# Patient Record
Sex: MALE | Race: WHITE | NOT HISPANIC OR LATINO | Employment: UNEMPLOYED | URBAN - NONMETROPOLITAN AREA
[De-identification: names, ages, dates, MRNs, and addresses within clinical notes are randomized per-mention and may not be internally consistent; named-entity substitution may affect disease eponyms.]

---

## 2017-01-16 RX ORDER — ATORVASTATIN CALCIUM 20 MG/1
TABLET, FILM COATED ORAL
Qty: 30 TABLET | Refills: 5 | Status: SHIPPED | OUTPATIENT
Start: 2017-01-16 | End: 2017-01-17 | Stop reason: SDUPTHER

## 2017-01-17 RX ORDER — ASPIRIN 81 MG
TABLET, DELAYED RELEASE (ENTERIC COATED) ORAL
Qty: 30 TABLET | Refills: 5 | Status: SHIPPED | OUTPATIENT
Start: 2017-01-17 | End: 2017-07-17 | Stop reason: SDUPTHER

## 2017-01-17 RX ORDER — MELOXICAM 15 MG/1
TABLET ORAL
Qty: 30 TABLET | Refills: 5 | Status: SHIPPED | OUTPATIENT
Start: 2017-01-17 | End: 2020-01-14 | Stop reason: SDUPTHER

## 2017-01-17 RX ORDER — ATORVASTATIN CALCIUM 20 MG/1
TABLET, FILM COATED ORAL
Qty: 30 TABLET | Refills: 5 | Status: SHIPPED | OUTPATIENT
Start: 2017-01-17 | End: 2018-09-28

## 2017-01-17 RX ORDER — AVOBENZONE, HOMOSALATE, OCTISALATE, OCTOCRYLENE 30; 100; 50; 25 MG/ML; MG/ML; MG/ML; MG/ML
SPRAY TOPICAL
Qty: 30 TABLET | Refills: 5 | Status: SHIPPED | OUTPATIENT
Start: 2017-01-17 | End: 2017-06-15 | Stop reason: SDUPTHER

## 2017-01-17 RX ORDER — ATORVASTATIN CALCIUM 20 MG/1
TABLET, FILM COATED ORAL
Qty: 30 TABLET | Refills: 5 | Status: SHIPPED | OUTPATIENT
Start: 2017-01-17 | End: 2018-01-08 | Stop reason: SDUPTHER

## 2017-02-01 DIAGNOSIS — I10 ESSENTIAL HYPERTENSION: ICD-10-CM

## 2017-02-01 RX ORDER — LISINOPRIL 10 MG/1
TABLET ORAL
Qty: 30 TABLET | Refills: 5 | Status: SHIPPED | OUTPATIENT
Start: 2017-02-01 | End: 2017-08-28 | Stop reason: SDUPTHER

## 2017-02-06 DIAGNOSIS — E11.40 TYPE 2 DIABETES MELLITUS WITH DIABETIC NEUROPATHY (HCC): ICD-10-CM

## 2017-02-10 RX ORDER — MIRABEGRON 25 MG/1
TABLET, FILM COATED, EXTENDED RELEASE ORAL
Qty: 30 TABLET | Refills: 5 | Status: SHIPPED | OUTPATIENT
Start: 2017-02-10 | End: 2017-12-04 | Stop reason: SDUPTHER

## 2017-02-27 DIAGNOSIS — E11.40 TYPE 2 DIABETES MELLITUS WITH DIABETIC NEUROPATHY (HCC): ICD-10-CM

## 2017-02-27 RX ORDER — GABAPENTIN 300 MG/1
CAPSULE ORAL
Qty: 30 CAPSULE | Refills: 5 | Status: SHIPPED | OUTPATIENT
Start: 2017-02-27 | End: 2017-09-07 | Stop reason: SDUPTHER

## 2017-03-06 RX ORDER — LORATADINE 10 MG/1
10 TABLET ORAL DAILY PRN
Qty: 30 TABLET | Refills: 5 | Status: SHIPPED | OUTPATIENT
Start: 2017-03-06 | End: 2017-06-15

## 2017-03-06 RX ORDER — LORATADINE 10 MG/1
TABLET ORAL
Qty: 30 TABLET | Refills: 3 | OUTPATIENT
Start: 2017-03-06

## 2017-03-13 ENCOUNTER — OFFICE VISIT (OUTPATIENT)
Dept: FAMILY MEDICINE CLINIC | Facility: CLINIC | Age: 62
End: 2017-03-13

## 2017-03-13 VITALS
TEMPERATURE: 99 F | BODY MASS INDEX: 28.92 KG/M2 | RESPIRATION RATE: 12 BRPM | HEART RATE: 76 BPM | OXYGEN SATURATION: 97 % | DIASTOLIC BLOOD PRESSURE: 70 MMHG | SYSTOLIC BLOOD PRESSURE: 130 MMHG | WEIGHT: 202 LBS | HEIGHT: 70 IN

## 2017-03-13 DIAGNOSIS — Z72.0 DIPS TOBACCO: ICD-10-CM

## 2017-03-13 DIAGNOSIS — J30.9 CHRONIC ALLERGIC RHINITIS: ICD-10-CM

## 2017-03-13 DIAGNOSIS — E11.40 TYPE 2 DIABETES MELLITUS WITH DIABETIC NEUROPATHY, WITHOUT LONG-TERM CURRENT USE OF INSULIN (HCC): ICD-10-CM

## 2017-03-13 DIAGNOSIS — I10 ESSENTIAL HYPERTENSION: Primary | ICD-10-CM

## 2017-03-13 DIAGNOSIS — K21.9 GASTRO-ESOPHAGEAL REFLUX DISEASE WITHOUT ESOPHAGITIS: ICD-10-CM

## 2017-03-13 DIAGNOSIS — R35.0 URINARY FREQUENCY: ICD-10-CM

## 2017-03-13 DIAGNOSIS — E78.2 MIXED HYPERLIPIDEMIA: ICD-10-CM

## 2017-03-13 DIAGNOSIS — L21.9 SEBORRHEIC DERMATITIS: ICD-10-CM

## 2017-03-13 LAB — HBA1C MFR BLD: 7.1 % (ref 4.5–5.7)

## 2017-03-13 PROCEDURE — 83036 HEMOGLOBIN GLYCOSYLATED A1C: CPT | Performed by: GENERAL PRACTICE

## 2017-03-13 PROCEDURE — 36415 COLL VENOUS BLD VENIPUNCTURE: CPT | Performed by: GENERAL PRACTICE

## 2017-03-13 PROCEDURE — 99214 OFFICE O/P EST MOD 30 MIN: CPT | Performed by: GENERAL PRACTICE

## 2017-03-13 RX ORDER — METFORMIN HYDROCHLORIDE 500 MG/1
TABLET, EXTENDED RELEASE ORAL
COMMUNITY
Start: 2017-02-27 | End: 2017-06-27 | Stop reason: SDUPTHER

## 2017-03-13 NOTE — PROGRESS NOTES
Subjective   Ed Villalba is a 61 y.o. male.     History of Present Illness     Diabetes  Current symptoms include polyuria. Patient denies paresthesia of the feet, visual disturbances, polydipsia, hypoglycemia and foot ulcerations. Evaluation to date has been: hemoglobin A1C. Home sugars: patient does not check sugars. Current treatments: metformin. Last dilated eye exam within one year. Most recent hemoglobin A1c   Lab Results   Component Value Date    HGBA1C 7.10 (H) 03/13/2017    HGBA1C 7.80 (H) 12/12/2016    HGBA1C 6.8 (H) 05/02/2016    HGBA1C 6.6 (H) 11/03/2015    HGBA1C 6.6 (H) 05/07/2015    He has been trying to follow his diet and excise plan more carefully since his last lab work    Dyslipidemia  Compliance with treatment has been excellent. The patient exercises daily. He is currently being prescribed the following medication for his dyslipidemia - atorvastatin. Patient denies side effects associated with his medications. Most recent lipids include  Lab Results   Component Value Date    TRIG 296 (H) 12/12/2016    TRIG 120 05/02/2016    TRIG 91 11/03/2015    HDL 32 (L) 12/12/2016    HDL 32 (L) 05/02/2016    HDL 35 (L) 11/03/2015    LDLCALC 23 12/12/2016    LDL 32 05/02/2016    LDL 32 11/03/2015     Hypertension  Home blood pressure readings: are consistently at goal. Associated signs and symptoms: none. Patient denies: chest pain, palpitations, dyspnea, orthopnea, paroxysmal nocturnal dyspnea and peripheral edema. Current antihypertensive medications includes lisinopril. Medication compliance: taking as prescribed. Most recent creatinine   Lab Results   Component Value Date    CREATININE 0.97 12/12/2016    CREATININE 0.89 05/02/2016     Urinary Frequency  Since last here he has continued to hold myrbetriq for several days on and off as he develops suprapubic pain when he has taken it regularly for 3-4 weeks.  The pain has promptly resolved each time.  In between he continues to average 2-3 episodes of  nocturia and denies any daytime frequency, and has had no urgency, dysuria, or hematuria.  He denies any other apparent side effects    The following portions of the patient's history were reviewed and updated as appropriate: allergies, current medications, past medical history, past social history and problem list.    Review of Systems   Constitutional: Positive for fatigue. Negative for appetite change, chills, fever and unexpected weight change.   HENT: Negative for congestion, ear pain, rhinorrhea, sneezing, sore throat and voice change.    Eyes: Negative for visual disturbance.   Respiratory: Negative for cough, shortness of breath and wheezing.    Cardiovascular: Negative for chest pain, palpitations and leg swelling.   Gastrointestinal: Negative for abdominal pain, blood in stool, constipation, diarrhea, nausea and vomiting.   Endocrine: Positive for polyuria. Negative for polydipsia.   Genitourinary: Negative for difficulty urinating, dysuria, frequency, hematuria and urgency.        Nocturia ×2-3   Musculoskeletal: Positive for arthralgias. Negative for back pain, joint swelling, myalgias and neck pain.   Skin: Negative for color change.   Neurological: Negative for tremors, weakness, numbness and headaches.   Psychiatric/Behavioral: Negative for dysphoric mood, sleep disturbance and suicidal ideas. The patient is not nervous/anxious.      Objective   Physical Exam   Constitutional: He is oriented to person, place, and time. He appears well-developed and well-nourished. He is cooperative. He does not have a sickly appearance. No distress.   Bright, in good spirits.  No apparent distress.  No pallor, cyanosis, diaphoresis, or jaundice   HENT:   Head: Atraumatic.   Right Ear: Tympanic membrane, external ear and ear canal normal.   Left Ear: Tympanic membrane, external ear and ear canal normal.   Mouth/Throat: Oropharynx is clear and moist. Dental caries present.   Eyes: EOM are normal. Pupils are equal,  round, and reactive to light. No scleral icterus.   Neck: No JVD present. Carotid bruit is not present. No tracheal deviation present. No thyromegaly present.   Cardiovascular: Normal rate, regular rhythm, S1 normal, S2 normal, normal heart sounds and intact distal pulses.  Exam reveals no gallop.    No murmur heard.  Pulmonary/Chest: Breath sounds normal. He has no wheezes. He has no rales.   Abdominal: Soft. Normal aorta and bowel sounds are normal. He exhibits no abdominal bruit and no mass. There is no hepatosplenomegaly. There is no tenderness. No hernia.   Musculoskeletal: He exhibits no tenderness or deformity.   Lymphadenopathy:        Head (right side): No submandibular adenopathy present.        Head (left side): No submandibular adenopathy present.     He has no cervical adenopathy.   Neurological: He is alert and oriented to person, place, and time. He has normal strength and normal reflexes. He displays normal reflexes. A sensory deficit (decreased vibration sense both feet) is present. No cranial nerve deficit. He exhibits normal muscle tone. Coordination normal.   Skin: Skin is warm and dry. No rash noted. He is not diaphoretic. No pallor. Nails show no clubbing.   Psychiatric: He has a normal mood and affect. His behavior is normal.     Assessment/Plan   Problems Addressed this Visit        Cardiovascular and Mediastinum    Essential hypertension  Hypertension: at goal. Evidence of target organ damage: none.  Encouraged to continue to work on diet and exercise plan.   Continue current medication    Mixed hyperlipidemia  As above. Continue current medication.       Respiratory    Chronic allergic rhinitis       Digestive    Gastro-esophageal reflux disease without esophagitis       Endocrine    Type 2 diabetes mellitus with diabetic neuropathy, without long-term current use of insulin  Diabetes mellitus Type II, under inadequate control.   Encouraged to continue to pursue ADA diet  Encouraged aerobic  exercise.  Hemoglobin A1c drawn.  If this remains elevated we'll titrate metformin or add a second agent    Relevant Orders    Hemoglobin A1c (Completed)       Musculoskeletal and Integument    Seborrheic dermatitis       Genitourinary    Urinary frequency  Etiology remains unclear.  He remains uninterested in a urology reassessment.  Continue current medication.  Encouraged report if any worse or if any new symptoms prior to his return        Other    Dips tobacco

## 2017-06-15 ENCOUNTER — OFFICE VISIT (OUTPATIENT)
Dept: FAMILY MEDICINE CLINIC | Facility: CLINIC | Age: 62
End: 2017-06-15

## 2017-06-15 VITALS
RESPIRATION RATE: 12 BRPM | HEIGHT: 70 IN | SYSTOLIC BLOOD PRESSURE: 120 MMHG | WEIGHT: 197 LBS | DIASTOLIC BLOOD PRESSURE: 65 MMHG | OXYGEN SATURATION: 96 % | HEART RATE: 86 BPM | TEMPERATURE: 98.3 F | BODY MASS INDEX: 28.2 KG/M2

## 2017-06-15 DIAGNOSIS — E11.40 TYPE 2 DIABETES MELLITUS WITH DIABETIC NEUROPATHY, WITHOUT LONG-TERM CURRENT USE OF INSULIN (HCC): ICD-10-CM

## 2017-06-15 DIAGNOSIS — K21.9 GASTRO-ESOPHAGEAL REFLUX DISEASE WITHOUT ESOPHAGITIS: ICD-10-CM

## 2017-06-15 DIAGNOSIS — E78.2 MIXED HYPERLIPIDEMIA: ICD-10-CM

## 2017-06-15 DIAGNOSIS — R35.0 URINARY FREQUENCY: ICD-10-CM

## 2017-06-15 DIAGNOSIS — J30.9 CHRONIC ALLERGIC RHINITIS: ICD-10-CM

## 2017-06-15 DIAGNOSIS — I10 ESSENTIAL HYPERTENSION: Primary | ICD-10-CM

## 2017-06-15 DIAGNOSIS — Z00.00 HEALTHCARE MAINTENANCE: ICD-10-CM

## 2017-06-15 DIAGNOSIS — Z72.0 DIPS TOBACCO: ICD-10-CM

## 2017-06-15 PROCEDURE — 99214 OFFICE O/P EST MOD 30 MIN: CPT | Performed by: GENERAL PRACTICE

## 2017-06-15 RX ORDER — CETIRIZINE HYDROCHLORIDE 10 MG/1
10 TABLET ORAL DAILY PRN
Qty: 30 TABLET | Refills: 5 | Status: SHIPPED | OUTPATIENT
Start: 2017-06-15 | End: 2017-09-13

## 2017-06-15 NOTE — PROGRESS NOTES
Subjective   Ed Villalba is a 62 y.o. male.     History of Present Illness     Allergic Rhinitis  Patient has a history of allergic rhinitis. Patient's symptoms include sneezing, clear rhinorrhea, nasal congestion and postnasal drip. These symptoms are perennial with seasonal exacerbation. The patient has been suffering from these symptoms for a number of years . The patient has tried loratadine with fair relief of symptoms. Immunotherapy has never been tried. The patient has no history of asthma. The patient has no history of eczema. The patient does not suffer from frequent sinopulmonary infections. The patient has not had sinus surgery in the past.     Diabetes  Current symptoms include polyuria. Patient denies paresthesia of the feet, visual disturbances, polydipsia, hypoglycemia and foot ulcerations. Evaluation to date has been: hemoglobin A1C. Home sugars: patient does not check sugars. Current treatments: metformin.  He has been unable to tolerate any more than 500 mg daily.  Any higher dose results in dizziness.  Last dilated eye exam within one year. Most recent hemoglobin A1c   Lab Results   Component Value Date    HGBA1C 7.10 (H) 03/13/2017    HGBA1C 7.80 (H) 12/12/2016    HGBA1C 6.8 (H) 05/02/2016    HGBA1C 6.6 (H) 11/03/2015    HGBA1C 6.6 (H) 05/07/2015      Dyslipidemia  Compliance with treatment has been excellent. The patient exercises daily. He is currently being prescribed the following medication for his dyslipidemia - atorvastatin. Patient denies side effects associated with his medications. He has not had a recent lipid profile    Hypertension  Home blood pressure readings: are consistently at goal. Associated signs and symptoms: none. Patient denies: chest pain, palpitations, dyspnea, orthopnea, paroxysmal nocturnal dyspnea and peripheral edema. Current antihypertensive medications includes lisinopril. Medication compliance: taking as prescribed. Most recent creatinine   Lab Results    Component Value Date    CREATININE 0.97 12/12/2016    CREATININE 0.89 05/02/2016     Urinary Frequency  Since last here he has continued to hold myrbetriq for several days on and off as he develops suprapubic pain when he has taken it regularly for 3-4 weeks.  The pain has promptly resolved each time.  In between he continues to average 2-3 episodes of nocturia and denies any daytime frequency, and has had no urgency, dysuria, or hematuria.  He denies any other apparent side effects    The following portions of the patient's history were reviewed and updated as appropriate: allergies, current medications, past medical history, past social history and problem list.    Review of Systems   Constitutional: Positive for fatigue. Negative for appetite change, chills, fever and unexpected weight change.   HENT: Positive for congestion, postnasal drip, rhinorrhea and sneezing. Negative for ear pain, sinus pressure, sore throat and voice change.    Eyes: Negative for visual disturbance.   Respiratory: Negative for cough, shortness of breath and wheezing.    Cardiovascular: Negative for chest pain, palpitations and leg swelling.   Gastrointestinal: Negative for abdominal pain, blood in stool, constipation, diarrhea, nausea and vomiting.   Endocrine: Positive for polyuria. Negative for polydipsia.   Genitourinary: Negative for difficulty urinating, dysuria, frequency, hematuria and urgency.        Nocturia ×2-3   Musculoskeletal: Positive for arthralgias. Negative for back pain, joint swelling, myalgias and neck pain.   Skin: Negative for color change.   Neurological: Negative for tremors, weakness, numbness and headaches.   Psychiatric/Behavioral: Negative for dysphoric mood, sleep disturbance and suicidal ideas. The patient is not nervous/anxious.      Objective   Physical Exam   Constitutional: He is oriented to person, place, and time. He appears well-developed and well-nourished. He is cooperative. He does not have a  sickly appearance. No distress.   Bright, in good spirits.  No apparent distress.  No pallor, cyanosis, diaphoresis, or jaundice   HENT:   Head: Atraumatic.   Right Ear: Tympanic membrane, external ear and ear canal normal.   Left Ear: Tympanic membrane, external ear and ear canal normal.   Mouth/Throat: Oropharynx is clear and moist. Dental caries present.   Eyes: EOM are normal. Pupils are equal, round, and reactive to light. No scleral icterus.   Neck: No JVD present. Carotid bruit is not present. No tracheal deviation present. No thyromegaly present.   Cardiovascular: Normal rate, regular rhythm, S1 normal, S2 normal, normal heart sounds and intact distal pulses.  Exam reveals no gallop.    No murmur heard.  Pulmonary/Chest: Breath sounds normal. He has no wheezes. He has no rales.   Abdominal: Soft. Normal aorta and bowel sounds are normal. He exhibits no abdominal bruit and no mass. There is no hepatosplenomegaly. There is no tenderness. No hernia.   Musculoskeletal: He exhibits no tenderness or deformity.    Ed had a diabetic foot exam performed today.  Lymphadenopathy:        Head (right side): No submandibular adenopathy present.        Head (left side): No submandibular adenopathy present.     He has no cervical adenopathy.   Neurological: He is alert and oriented to person, place, and time. He has normal strength and normal reflexes. He displays normal reflexes. A sensory deficit (decreased vibration sense both feet) is present. No cranial nerve deficit. He exhibits normal muscle tone. Coordination normal.   Skin: Skin is warm and dry. No rash noted. He is not diaphoretic. No pallor. Nails show no clubbing.   Psychiatric: He has a normal mood and affect. His behavior is normal.     Assessment/Plan   Problems Addressed this Visit        Cardiovascular and Mediastinum    Essential hypertension   Hypertension: at goal. Evidence of target organ damage: none.  Encouraged to continue to work on diet and  exercise plan.   Continue current medication   Fasting lab work will be updated just prior to his return in 3 months    Relevant Orders    CBC & Differential    Comprehensive Metabolic Panel    Mixed hyperlipidemia  As above. Continue current medication.    Relevant Orders    Lipid Panel    TSH       Respiratory    Chronic allergic rhinitis  Reminded regarding allergen avoidance. Will replace loratadine with a trial of cetirizine    Relevant Medications    cetirizine (zyrTEC) 10 MG tablet       Digestive    Gastro-esophageal reflux disease without esophagitis       Endocrine    Type 2 diabetes mellitus with diabetic neuropathy, without long-term current use of insulin  Diabetes mellitus Type II, under good control.   Encouraged to continue to pursue ADA diet  Encouraged aerobic exercise.  Reminded to get yearly retinal exam.    Relevant Orders    Hemoglobin A1c    MicroAlbumin, Urine, Random       Genitourinary    Urinary frequency  Etiology remains unclear.  He remains uninterested in a urology reassessment.  Continue current medication.  Encouraged report if any worse or if any new symptoms prior to his return       Other    Dips tobacco    Healthcare maintenance  Reminded to get a flu shot when available.  We'll discuss an updated colonoscopy at his return      Relevant Medications    Cholecalciferol (RA VITAMIN D-3) 1000 UNITS tablet

## 2017-06-26 RX ORDER — METFORMIN HYDROCHLORIDE 500 MG/1
TABLET, EXTENDED RELEASE ORAL
Qty: 30 TABLET | Refills: 5 | Status: CANCELLED | OUTPATIENT
Start: 2017-06-26

## 2017-07-12 RX ORDER — METFORMIN HYDROCHLORIDE 500 MG/1
TABLET, EXTENDED RELEASE ORAL
Qty: 30 TABLET | Refills: 2
Start: 2017-07-12 | End: 2018-03-02

## 2017-07-12 RX ORDER — METFORMIN HYDROCHLORIDE 500 MG/1
500 TABLET, EXTENDED RELEASE ORAL
Qty: 30 TABLET | Refills: 5 | Status: SHIPPED | OUTPATIENT
Start: 2017-07-12 | End: 2018-03-02

## 2017-07-17 RX ORDER — ACETAMINOPHEN/DIPHENHYDRAMINE 500MG-25MG
TABLET ORAL
Qty: 30 TABLET | Refills: 5 | Status: SHIPPED | OUTPATIENT
Start: 2017-07-17 | End: 2018-01-08 | Stop reason: SDUPTHER

## 2017-07-17 RX ORDER — ACETAMINOPHEN/DIPHENHYDRAMINE 500MG-25MG
TABLET ORAL
Qty: 30 TABLET | Refills: 5 | OUTPATIENT
Start: 2017-07-17

## 2017-08-28 DIAGNOSIS — K21.9 GASTRO-ESOPHAGEAL REFLUX DISEASE WITHOUT ESOPHAGITIS: ICD-10-CM

## 2017-08-28 DIAGNOSIS — I10 ESSENTIAL HYPERTENSION: ICD-10-CM

## 2017-08-28 RX ORDER — PANTOPRAZOLE SODIUM 40 MG/1
TABLET, DELAYED RELEASE ORAL
Qty: 30 TABLET | Refills: 5 | Status: SHIPPED | OUTPATIENT
Start: 2017-08-28 | End: 2018-02-26 | Stop reason: SDUPTHER

## 2017-08-28 RX ORDER — LISINOPRIL 10 MG/1
TABLET ORAL
Qty: 30 TABLET | Refills: 5 | Status: SHIPPED | OUTPATIENT
Start: 2017-08-28 | End: 2018-02-26 | Stop reason: SDUPTHER

## 2017-09-07 ENCOUNTER — LAB (OUTPATIENT)
Dept: FAMILY MEDICINE CLINIC | Facility: CLINIC | Age: 62
End: 2017-09-07

## 2017-09-07 DIAGNOSIS — E11.40 TYPE 2 DIABETES MELLITUS WITH DIABETIC NEUROPATHY (HCC): ICD-10-CM

## 2017-09-07 DIAGNOSIS — E11.40 TYPE 2 DIABETES MELLITUS WITH DIABETIC NEUROPATHY, WITHOUT LONG-TERM CURRENT USE OF INSULIN (HCC): ICD-10-CM

## 2017-09-07 DIAGNOSIS — E78.2 MIXED HYPERLIPIDEMIA: ICD-10-CM

## 2017-09-07 DIAGNOSIS — I10 ESSENTIAL HYPERTENSION: ICD-10-CM

## 2017-09-07 LAB
ALBUMIN SERPL-MCNC: 4.3 G/DL (ref 3.4–4.8)
ALBUMIN UR-MCNC: 34.8 MG/L
ALBUMIN/GLOB SERPL: 1.4 G/DL (ref 1.5–2.5)
ALP SERPL-CCNC: 66 U/L (ref 40–129)
ALT SERPL W P-5'-P-CCNC: 33 U/L (ref 10–44)
ANION GAP SERPL CALCULATED.3IONS-SCNC: 6.8 MMOL/L (ref 3.6–11.2)
AST SERPL-CCNC: 24 U/L (ref 10–34)
BASOPHILS # BLD AUTO: 0.05 10*3/MM3 (ref 0–0.3)
BASOPHILS NFR BLD AUTO: 0.6 % (ref 0–2)
BILIRUB SERPL-MCNC: 0.7 MG/DL (ref 0.2–1.8)
BUN BLD-MCNC: 10 MG/DL (ref 7–21)
BUN/CREAT SERPL: 11.1 (ref 7–25)
CALCIUM SPEC-SCNC: 9.3 MG/DL (ref 7.7–10)
CHLORIDE SERPL-SCNC: 106 MMOL/L (ref 99–112)
CHOLEST SERPL-MCNC: 73 MG/DL (ref 0–200)
CO2 SERPL-SCNC: 27.2 MMOL/L (ref 24.3–31.9)
CREAT BLD-MCNC: 0.9 MG/DL (ref 0.43–1.29)
DEPRECATED RDW RBC AUTO: 46.4 FL (ref 37–54)
EOSINOPHIL # BLD AUTO: 1.79 10*3/MM3 (ref 0–0.7)
EOSINOPHIL NFR BLD AUTO: 21.7 % (ref 0–5)
ERYTHROCYTE [DISTWIDTH] IN BLOOD BY AUTOMATED COUNT: 14.2 % (ref 11.5–14.5)
GFR SERPL CREATININE-BSD FRML MDRD: 86 ML/MIN/1.73
GLOBULIN UR ELPH-MCNC: 3.1 GM/DL
GLUCOSE BLD-MCNC: 152 MG/DL (ref 70–110)
HBA1C MFR BLD: 7.6 % (ref 4.5–5.7)
HCT VFR BLD AUTO: 46.7 % (ref 42–52)
HDLC SERPL-MCNC: 28 MG/DL (ref 60–100)
HGB BLD-MCNC: 15.7 G/DL (ref 14–18)
IMM GRANULOCYTES # BLD: 0.01 10*3/MM3 (ref 0–0.03)
IMM GRANULOCYTES NFR BLD: 0.1 % (ref 0–0.5)
LDLC SERPL CALC-MCNC: 30 MG/DL (ref 0–100)
LDLC/HDLC SERPL: 1.09 {RATIO}
LYMPHOCYTES # BLD AUTO: 2.18 10*3/MM3 (ref 1–3)
LYMPHOCYTES NFR BLD AUTO: 26.4 % (ref 21–51)
MCH RBC QN AUTO: 30.7 PG (ref 27–33)
MCHC RBC AUTO-ENTMCNC: 33.6 G/DL (ref 33–37)
MCV RBC AUTO: 91.2 FL (ref 80–94)
MONOCYTES # BLD AUTO: 0.66 10*3/MM3 (ref 0.1–0.9)
MONOCYTES NFR BLD AUTO: 8 % (ref 0–10)
NEUTROPHILS # BLD AUTO: 3.57 10*3/MM3 (ref 1.4–6.5)
NEUTROPHILS NFR BLD AUTO: 43.2 % (ref 30–70)
OSMOLALITY SERPL CALC.SUM OF ELEC: 281.4 MOSM/KG (ref 273–305)
PLATELET # BLD AUTO: 201 10*3/MM3 (ref 130–400)
PMV BLD AUTO: 10.7 FL (ref 6–10)
POTASSIUM BLD-SCNC: 4.2 MMOL/L (ref 3.5–5.3)
PROT SERPL-MCNC: 7.4 G/DL (ref 6–8)
RBC # BLD AUTO: 5.12 10*6/MM3 (ref 4.7–6.1)
SODIUM BLD-SCNC: 140 MMOL/L (ref 135–153)
TRIGL SERPL-MCNC: 73 MG/DL (ref 0–150)
TSH SERPL DL<=0.05 MIU/L-ACNC: 1.71 MIU/ML (ref 0.55–4.78)
VLDLC SERPL-MCNC: 14.6 MG/DL
WBC NRBC COR # BLD: 8.26 10*3/MM3 (ref 4.5–12.5)

## 2017-09-07 PROCEDURE — 83036 HEMOGLOBIN GLYCOSYLATED A1C: CPT | Performed by: GENERAL PRACTICE

## 2017-09-07 PROCEDURE — 80053 COMPREHEN METABOLIC PANEL: CPT | Performed by: GENERAL PRACTICE

## 2017-09-07 PROCEDURE — 84443 ASSAY THYROID STIM HORMONE: CPT | Performed by: GENERAL PRACTICE

## 2017-09-07 PROCEDURE — 80061 LIPID PANEL: CPT | Performed by: GENERAL PRACTICE

## 2017-09-07 PROCEDURE — 82043 UR ALBUMIN QUANTITATIVE: CPT | Performed by: GENERAL PRACTICE

## 2017-09-07 PROCEDURE — 36415 COLL VENOUS BLD VENIPUNCTURE: CPT

## 2017-09-07 PROCEDURE — 85025 COMPLETE CBC W/AUTO DIFF WBC: CPT | Performed by: GENERAL PRACTICE

## 2017-09-07 RX ORDER — GABAPENTIN 300 MG/1
CAPSULE ORAL
Qty: 30 CAPSULE | Refills: 0 | Status: SHIPPED | OUTPATIENT
Start: 2017-09-07 | End: 2018-06-28

## 2017-09-13 RX ORDER — LORATADINE 10 MG/1
TABLET ORAL
Qty: 30 TABLET | Refills: 5 | Status: SHIPPED | OUTPATIENT
Start: 2017-09-13 | End: 2018-03-15 | Stop reason: SDUPTHER

## 2017-09-14 ENCOUNTER — OFFICE VISIT (OUTPATIENT)
Dept: FAMILY MEDICINE CLINIC | Facility: CLINIC | Age: 62
End: 2017-09-14

## 2017-09-14 VITALS
DIASTOLIC BLOOD PRESSURE: 70 MMHG | TEMPERATURE: 98.6 F | HEART RATE: 86 BPM | OXYGEN SATURATION: 98 % | BODY MASS INDEX: 27.77 KG/M2 | SYSTOLIC BLOOD PRESSURE: 120 MMHG | RESPIRATION RATE: 12 BRPM | HEIGHT: 70 IN | WEIGHT: 194 LBS

## 2017-09-14 DIAGNOSIS — R35.0 URINARY FREQUENCY: ICD-10-CM

## 2017-09-14 DIAGNOSIS — K21.9 GASTRO-ESOPHAGEAL REFLUX DISEASE WITHOUT ESOPHAGITIS: ICD-10-CM

## 2017-09-14 DIAGNOSIS — E11.40 TYPE 2 DIABETES MELLITUS WITH DIABETIC NEUROPATHY, WITHOUT LONG-TERM CURRENT USE OF INSULIN (HCC): ICD-10-CM

## 2017-09-14 DIAGNOSIS — Z00.00 HEALTHCARE MAINTENANCE: ICD-10-CM

## 2017-09-14 DIAGNOSIS — E78.2 MIXED HYPERLIPIDEMIA: ICD-10-CM

## 2017-09-14 DIAGNOSIS — J30.9 CHRONIC ALLERGIC RHINITIS: ICD-10-CM

## 2017-09-14 DIAGNOSIS — Z72.0 DIPS TOBACCO: ICD-10-CM

## 2017-09-14 DIAGNOSIS — I10 ESSENTIAL HYPERTENSION: Primary | ICD-10-CM

## 2017-09-14 PROCEDURE — 99214 OFFICE O/P EST MOD 30 MIN: CPT | Performed by: GENERAL PRACTICE

## 2017-09-14 NOTE — PROGRESS NOTES
Subjective   Ed Villalba is a 62 y.o. male.     History of Present Illness     Urinary Frequency  Since last here he has continued to hold myrbetriq for several days on and off as he develops suprapubic pain when he has taken it regularly for 3-4 weeks.  The pain has promptly resolved each time.  In between he continues to average 2-3 episodes of nocturia and denies any daytime frequency, and has had no urgency, dysuria, or hematuria.  He denies any other apparent side effects    Diabetes  Current symptoms include polyuria. Patient denies paresthesia of the feet, visual disturbances, polydipsia, hypoglycemia and foot ulcerations. Evaluation to date has been: hemoglobin A1C. Home sugars: patient does not check sugars. Current treatments: metformin.  Like the myrbetriq , the metformin tends to bother him if he takes it regularly and he occasionally halves or skips it.  Last dilated eye exam 10/26/16. Most recent hemoglobin A1c   Lab Results   Component Value Date    HGBA1C 7.60 (H) 09/07/2017    HGBA1C 7.10 (H) 03/13/2017    HGBA1C 7.80 (H) 12/12/2016      Dyslipidemia  Compliance with treatment has been excellent. The patient exercises daily. He is currently being prescribed the following medication for his dyslipidemia - atorvastatin. Patient denies side effects associated with his medications. Most recent lipids include  Lab Results   Component Value Date    TRIG 73 09/07/2017    TRIG 296 (H) 12/12/2016    HDL 28 (L) 09/07/2017    HDL 32 (L) 12/12/2016    LDLCALC 30 09/07/2017    LDLCALC 23 12/12/2016    LDL 32 05/02/2016    LDL 32 11/03/2015     Hypertension  Home blood pressure readings: are consistently at goal. Associated signs and symptoms: none. Patient denies: chest pain, palpitations, dyspnea, orthopnea, paroxysmal nocturnal dyspnea and peripheral edema. Current antihypertensive medications includes lisinopril. Medication compliance: taking as prescribed. Most recent creatinine   Lab Results   Component  Value Date    CREATININE 0.90 09/07/2017     Allergic Rhinitis  Patient has a history of allergic rhinitis. Patient's symptoms include sneezing, clear rhinorrhea, nasal congestion and postnasal drip. These symptoms are perennial with seasonal exacerbation. The patient has been suffering from these symptoms for a number of years . The patient has tried loratadine with fair relief of symptoms. Immunotherapy has never been tried. The patient has no history of asthma. The patient has no history of eczema. The patient does not suffer from frequent sinopulmonary infections. The patient has not had sinus surgery in the past.     The following portions of the patient's history were reviewed and updated as appropriate: allergies, current medications, past medical history, past social history and problem list.    Review of Systems   Constitutional: Positive for fatigue. Negative for appetite change, chills, fever and unexpected weight change.   HENT: Positive for rhinorrhea and sneezing. Negative for congestion, ear pain, postnasal drip, sinus pressure, sore throat and voice change.    Eyes: Negative for visual disturbance.   Respiratory: Negative for cough, shortness of breath and wheezing.    Cardiovascular: Negative for chest pain, palpitations and leg swelling.   Gastrointestinal: Negative for abdominal pain, blood in stool, constipation, diarrhea, nausea and vomiting.   Endocrine: Positive for polyuria. Negative for polydipsia.   Genitourinary: Negative for difficulty urinating, dysuria, frequency, hematuria and urgency.        Nocturia ×2-3   Musculoskeletal: Positive for arthralgias. Negative for back pain, joint swelling, myalgias and neck pain.   Skin: Negative for color change.   Neurological: Negative for tremors, weakness, numbness and headaches.   Psychiatric/Behavioral: Negative for dysphoric mood, sleep disturbance and suicidal ideas. The patient is not nervous/anxious.      Objective   Physical Exam    Constitutional: He is oriented to person, place, and time. He appears well-developed and well-nourished. He is cooperative. He does not have a sickly appearance. No distress.   Bright, in good spirits.  No apparent distress.  No pallor, cyanosis, diaphoresis, or jaundice   HENT:   Head: Atraumatic.   Right Ear: Tympanic membrane, external ear and ear canal normal.   Left Ear: Tympanic membrane, external ear and ear canal normal.   Mouth/Throat: Oropharynx is clear and moist. Dental caries present.   Eyes: EOM are normal. Pupils are equal, round, and reactive to light. No scleral icterus.   Neck: No JVD present. Carotid bruit is not present. No tracheal deviation present. No thyromegaly present.   Cardiovascular: Normal rate, regular rhythm, S1 normal, S2 normal, normal heart sounds and intact distal pulses.  Exam reveals no gallop.    No murmur heard.  Pulmonary/Chest: Breath sounds normal. He has no wheezes. He has no rales.   Abdominal: Soft. Normal aorta and bowel sounds are normal. He exhibits no abdominal bruit and no mass. There is no hepatosplenomegaly. There is no tenderness. No hernia.   Musculoskeletal: He exhibits no tenderness or deformity.    Ed had a diabetic foot exam performed today.  Lymphadenopathy:        Head (right side): No submandibular adenopathy present.        Head (left side): No submandibular adenopathy present.     He has no cervical adenopathy.   Neurological: He is alert and oriented to person, place, and time. He has normal strength and normal reflexes. He displays normal reflexes. A sensory deficit (decreased vibration sense both feet) is present. No cranial nerve deficit. He exhibits normal muscle tone. Coordination normal.   Skin: Skin is warm and dry. No rash noted. He is not diaphoretic. No pallor. Nails show no clubbing.   Psychiatric: He has a normal mood and affect. His behavior is normal.     Assessment/Plan   Problems Addressed this Visit        Cardiovascular and  Mediastinum    Essential hypertension  Hypertension: at goal. Evidence of target organ damage: none.  Encouraged to continue to work on diet and exercise plan.   Continue current medication    Mixed hyperlipidemia  As above.   Continue current medication.       Respiratory    Chronic allergic rhinitis  Continue current treatment. Reminded regarding allergen avoidance.       Digestive    Gastro-esophageal reflux disease without esophagitis       Endocrine    Type 2 diabetes mellitus with diabetic neuropathy, without long-term current use of insulin  Diabetes mellitus Type II, under good control.   Encouraged to continue to pursue ADA diet  Encouraged aerobic exercise.  Updated lab work will be arranged at his return       Genitourinary    Urinary frequency  Etiology remains unclear.  He remains uninterested in a urology reassessment.    Continue current medication.  Encouraged report if any worse or if any new symptoms prior to his return       Other    Dips tobacco    Healthcare maintenance  Reminded to get a flu shot when available  We'll discuss an updated colonoscopy at his return and a hepatitis C screen will be ordered with his next labs

## 2017-12-04 ENCOUNTER — OFFICE VISIT (OUTPATIENT)
Dept: FAMILY MEDICINE CLINIC | Facility: CLINIC | Age: 62
End: 2017-12-04

## 2017-12-04 VITALS
OXYGEN SATURATION: 97 % | HEIGHT: 70 IN | WEIGHT: 196 LBS | RESPIRATION RATE: 12 BRPM | TEMPERATURE: 98.4 F | SYSTOLIC BLOOD PRESSURE: 120 MMHG | HEART RATE: 103 BPM | BODY MASS INDEX: 28.06 KG/M2 | DIASTOLIC BLOOD PRESSURE: 70 MMHG

## 2017-12-04 DIAGNOSIS — Z72.0 DIPS TOBACCO: ICD-10-CM

## 2017-12-04 DIAGNOSIS — E78.2 MIXED HYPERLIPIDEMIA: ICD-10-CM

## 2017-12-04 DIAGNOSIS — I10 ESSENTIAL HYPERTENSION: Primary | ICD-10-CM

## 2017-12-04 DIAGNOSIS — E11.40 TYPE 2 DIABETES MELLITUS WITH DIABETIC NEUROPATHY, WITHOUT LONG-TERM CURRENT USE OF INSULIN (HCC): ICD-10-CM

## 2017-12-04 DIAGNOSIS — K21.9 GASTRO-ESOPHAGEAL REFLUX DISEASE WITHOUT ESOPHAGITIS: ICD-10-CM

## 2017-12-04 DIAGNOSIS — R35.0 URINARY FREQUENCY: ICD-10-CM

## 2017-12-04 DIAGNOSIS — J30.2 CHRONIC SEASONAL ALLERGIC RHINITIS, UNSPECIFIED TRIGGER: ICD-10-CM

## 2017-12-04 DIAGNOSIS — Z00.00 HEALTHCARE MAINTENANCE: ICD-10-CM

## 2017-12-04 PROCEDURE — 99214 OFFICE O/P EST MOD 30 MIN: CPT | Performed by: GENERAL PRACTICE

## 2017-12-04 NOTE — PROGRESS NOTES
Subjective   Ed Villalba is a 62 y.o. male.     History of Present Illness     Urinary Frequency  Since last here he experienced increased frequency and nocturia.  There is no history of any difficulty voiding, urgency, dysuria, or hematuria.  He has been taking myrbetriq 25 daily consistently since last here with no apparent side effects    Diabetes  Current symptoms include polyuria. Patient denies paresthesia of the feet, visual disturbances, polydipsia, hypoglycemia and foot ulcerations. Evaluation to date has been: hemoglobin A1C. Home sugars: patient does not check sugars. Current treatments: metformin.  Metformin tends to bother him if he takes it regularly and he occasionally halves or skips it.  Last dilated eye exam 10/26/16.  He has had no recent labs     Dyslipidemia  Compliance with treatment has been excellent. The patient exercises daily. He is currently being prescribed the following medication for his dyslipidemia - atorvastatin. Patient denies side effects associated with his medications.     Hypertension  Home blood pressure readings: are consistently at goal. Associated signs and symptoms: none. Patient denies: chest pain, palpitations, dyspnea, orthopnea, paroxysmal nocturnal dyspnea and peripheral edema. Current antihypertensive medications includes lisinopril. Medication compliance: taking as prescribed.     Allergic Rhinitis  Patient has a history of allergic rhinitis. Patient's symptoms include sneezing, clear rhinorrhea, nasal congestion and postnasal drip. These symptoms are perennial with seasonal exacerbation. The patient has been suffering from these symptoms for a number of years . The patient has tried loratadine with fair relief of symptoms.     The following portions of the patient's history were reviewed and updated as appropriate: allergies, current medications, past medical history, past social history and problem list.    Review of Systems   Constitutional: Positive for  fatigue. Negative for appetite change, chills, fever and unexpected weight change.   HENT: Positive for rhinorrhea and sneezing. Negative for congestion, ear pain, postnasal drip, sinus pressure, sore throat and voice change.    Eyes: Negative for visual disturbance.   Respiratory: Negative for cough, shortness of breath and wheezing.    Cardiovascular: Negative for chest pain, palpitations and leg swelling.   Gastrointestinal: Negative for abdominal pain, blood in stool, constipation, diarrhea, nausea and vomiting.   Endocrine: Positive for polyuria. Negative for polydipsia.   Genitourinary: Positive for frequency. Negative for difficulty urinating, dysuria, hematuria and urgency.        Nocturia ×2-3   Musculoskeletal: Positive for arthralgias. Negative for back pain, joint swelling, myalgias and neck pain.   Skin: Negative for color change.   Neurological: Negative for tremors, weakness, numbness and headaches.   Psychiatric/Behavioral: Negative for dysphoric mood, sleep disturbance and suicidal ideas. The patient is not nervous/anxious.      Objective   Physical Exam   Constitutional: He is oriented to person, place, and time. He appears well-developed and well-nourished. He is cooperative. He does not have a sickly appearance. No distress.   Bright, in good spirits.  No apparent distress.  No pallor, cyanosis, diaphoresis, or jaundice   HENT:   Head: Atraumatic.   Right Ear: Tympanic membrane, external ear and ear canal normal.   Left Ear: Tympanic membrane, external ear and ear canal normal.   Mouth/Throat: Oropharynx is clear and moist. Dental caries present.   Eyes: EOM are normal. Pupils are equal, round, and reactive to light. No scleral icterus.   Neck: No JVD present. Carotid bruit is not present. No tracheal deviation present. No thyromegaly present.   Cardiovascular: Normal rate, regular rhythm, S1 normal, S2 normal, normal heart sounds and intact distal pulses.  Exam reveals no gallop.    No murmur  heard.  Pulmonary/Chest: Breath sounds normal. He has no wheezes. He has no rales.   Abdominal: Soft. Normal aorta and bowel sounds are normal. He exhibits no abdominal bruit and no mass. There is no hepatosplenomegaly. There is no tenderness. No hernia.   Musculoskeletal: He exhibits no tenderness or deformity.   Lymphadenopathy:        Head (right side): No submandibular adenopathy present.        Head (left side): No submandibular adenopathy present.     He has no cervical adenopathy.   Neurological: He is alert and oriented to person, place, and time. He has normal strength and normal reflexes. He displays normal reflexes. A sensory deficit (decreased vibration sense both feet) is present. No cranial nerve deficit. He exhibits normal muscle tone. Coordination normal.   Skin: Skin is warm and dry. No rash noted. He is not diaphoretic. No pallor. Nails show no clubbing.   Psychiatric: He has a normal mood and affect. His behavior is normal.     Assessment/Plan   Problems Addressed this Visit        Cardiovascular and Mediastinum    Essential hypertension   Hypertension: at goal. Evidence of target organ damage: none.  Encouraged to continue to work on diet and exercise plan.   Continue current medication  Fasting lab work scheduled    Relevant Orders    CBC & Differential    Comprehensive Metabolic Panel    Mixed hyperlipidemia  As above.   Continue current medication.    Relevant Orders    Lipid Panel    TSH       Respiratory    Chronic seasonal allergic rhinitis       Digestive    Gastro-esophageal reflux disease without esophagitis       Endocrine    Type 2 diabetes mellitus with diabetic neuropathy, without long-term current use of insulin  Diabetes mellitus Type II, under good control.   Encouraged to continue to pursue ADA diet  Encouraged aerobic exercise.  Reminded to get yearly retinal exam.    Relevant Orders    Hemoglobin A1c    MicroAlbumin, Urine, Random - Urine, Clean Catch       Genitourinary     Urinary frequency  Patient remains uninterested in a urology reassessment  We'll titrate myrbetriq somewhat  Encouraged to report if any worse or if any new symptoms or concerns.    Relevant Medications    Mirabegron ER (MYRBETRIQ) 50 MG tablet sustained-release 24 hour 24 hr tablet       Other    Dips tobacco    Healthcare maintenance  Patient uninterested in a screening colonoscopy at present but will consider  Hepatitis C screen will be performed with his next labs     Relevant Orders    Hepatitis C Antibody

## 2017-12-06 DIAGNOSIS — E11.40 TYPE 2 DIABETES MELLITUS WITH DIABETIC NEUROPATHY (HCC): ICD-10-CM

## 2017-12-28 DIAGNOSIS — Z00.00 HEALTHCARE MAINTENANCE: ICD-10-CM

## 2017-12-29 RX ORDER — AVOBENZONE, HOMOSALATE, OCTISALATE, OCTOCRYLENE 30; 100; 50; 25 MG/ML; MG/ML; MG/ML; MG/ML
SPRAY TOPICAL
Qty: 30 TABLET | Refills: 0 | OUTPATIENT
Start: 2017-12-29 | End: 2018-01-30 | Stop reason: SDUPTHER

## 2017-12-29 RX ORDER — METFORMIN HYDROCHLORIDE 500 MG/1
TABLET, EXTENDED RELEASE ORAL
Qty: 30 TABLET | Refills: 0 | OUTPATIENT
Start: 2017-12-29 | End: 2018-03-02

## 2018-01-08 RX ORDER — ACETAMINOPHEN/DIPHENHYDRAMINE 500MG-25MG
TABLET ORAL
Qty: 30 TABLET | Refills: 4 | Status: SHIPPED | OUTPATIENT
Start: 2018-01-08 | End: 2018-06-11 | Stop reason: SDUPTHER

## 2018-01-08 RX ORDER — ATORVASTATIN CALCIUM 20 MG/1
TABLET, FILM COATED ORAL
Qty: 30 TABLET | Refills: 5 | Status: SHIPPED | OUTPATIENT
Start: 2018-01-08 | End: 2018-09-28

## 2018-01-23 ENCOUNTER — LAB (OUTPATIENT)
Dept: FAMILY MEDICINE CLINIC | Facility: CLINIC | Age: 63
End: 2018-01-23

## 2018-01-23 DIAGNOSIS — E78.2 MIXED HYPERLIPIDEMIA: ICD-10-CM

## 2018-01-23 DIAGNOSIS — Z00.00 HEALTHCARE MAINTENANCE: ICD-10-CM

## 2018-01-23 DIAGNOSIS — I10 ESSENTIAL HYPERTENSION: ICD-10-CM

## 2018-01-23 DIAGNOSIS — E11.40 TYPE 2 DIABETES MELLITUS WITH DIABETIC NEUROPATHY, WITHOUT LONG-TERM CURRENT USE OF INSULIN (HCC): ICD-10-CM

## 2018-01-23 LAB
ALBUMIN SERPL-MCNC: 4.3 G/DL (ref 3.4–4.8)
ALBUMIN UR-MCNC: 26 MG/L
ALBUMIN/GLOB SERPL: 1.4 G/DL (ref 1.5–2.5)
ALP SERPL-CCNC: 60 U/L (ref 40–129)
ALT SERPL W P-5'-P-CCNC: 21 U/L (ref 10–44)
ANION GAP SERPL CALCULATED.3IONS-SCNC: 6.8 MMOL/L (ref 3.6–11.2)
AST SERPL-CCNC: 29 U/L (ref 10–34)
BASOPHILS # BLD AUTO: 0.03 10*3/MM3 (ref 0–0.3)
BASOPHILS NFR BLD AUTO: 0.6 % (ref 0–2)
BILIRUB SERPL-MCNC: 0.5 MG/DL (ref 0.2–1.8)
BUN BLD-MCNC: 13 MG/DL (ref 7–21)
BUN/CREAT SERPL: 12.9 (ref 7–25)
CALCIUM SPEC-SCNC: 9 MG/DL (ref 7.7–10)
CHLORIDE SERPL-SCNC: 106 MMOL/L (ref 99–112)
CHOLEST SERPL-MCNC: 68 MG/DL (ref 0–200)
CO2 SERPL-SCNC: 23.2 MMOL/L (ref 24.3–31.9)
CREAT BLD-MCNC: 1.01 MG/DL (ref 0.43–1.29)
DEPRECATED RDW RBC AUTO: 45.4 FL (ref 37–54)
EOSINOPHIL # BLD AUTO: 0.39 10*3/MM3 (ref 0–0.7)
EOSINOPHIL NFR BLD AUTO: 8.3 % (ref 0–5)
ERYTHROCYTE [DISTWIDTH] IN BLOOD BY AUTOMATED COUNT: 13.9 % (ref 11.5–14.5)
GFR SERPL CREATININE-BSD FRML MDRD: 75 ML/MIN/1.73
GLOBULIN UR ELPH-MCNC: 3 GM/DL
GLUCOSE BLD-MCNC: 189 MG/DL (ref 70–110)
HBA1C MFR BLD: 7.5 % (ref 4.5–5.7)
HCT VFR BLD AUTO: 46.4 % (ref 42–52)
HCV AB SER DONR QL: NORMAL
HDLC SERPL-MCNC: 25 MG/DL (ref 60–100)
HGB BLD-MCNC: 15.9 G/DL (ref 14–18)
IMM GRANULOCYTES # BLD: 0.01 10*3/MM3 (ref 0–0.03)
IMM GRANULOCYTES NFR BLD: 0.2 % (ref 0–0.5)
LDLC SERPL CALC-MCNC: 23 MG/DL (ref 0–100)
LDLC/HDLC SERPL: 0.91 {RATIO}
LYMPHOCYTES # BLD AUTO: 1.79 10*3/MM3 (ref 1–3)
LYMPHOCYTES NFR BLD AUTO: 37.9 % (ref 21–51)
MCH RBC QN AUTO: 31.3 PG (ref 27–33)
MCHC RBC AUTO-ENTMCNC: 34.3 G/DL (ref 33–37)
MCV RBC AUTO: 91.3 FL (ref 80–94)
MONOCYTES # BLD AUTO: 0.62 10*3/MM3 (ref 0.1–0.9)
MONOCYTES NFR BLD AUTO: 13.1 % (ref 0–10)
NEUTROPHILS # BLD AUTO: 1.88 10*3/MM3 (ref 1.4–6.5)
NEUTROPHILS NFR BLD AUTO: 39.9 % (ref 30–70)
OSMOLALITY SERPL CALC.SUM OF ELEC: 277.1 MOSM/KG (ref 273–305)
PLATELET # BLD AUTO: 163 10*3/MM3 (ref 130–400)
PMV BLD AUTO: 10.5 FL (ref 6–10)
POTASSIUM BLD-SCNC: 4.3 MMOL/L (ref 3.5–5.3)
PROT SERPL-MCNC: 7.3 G/DL (ref 6–8)
RBC # BLD AUTO: 5.08 10*6/MM3 (ref 4.7–6.1)
SODIUM BLD-SCNC: 136 MMOL/L (ref 135–153)
TRIGL SERPL-MCNC: 101 MG/DL (ref 0–150)
TSH SERPL DL<=0.05 MIU/L-ACNC: 1.96 MIU/ML (ref 0.55–4.78)
VLDLC SERPL-MCNC: 20.2 MG/DL
WBC NRBC COR # BLD: 4.72 10*3/MM3 (ref 4.5–12.5)

## 2018-01-23 PROCEDURE — 85025 COMPLETE CBC W/AUTO DIFF WBC: CPT | Performed by: GENERAL PRACTICE

## 2018-01-23 PROCEDURE — 84443 ASSAY THYROID STIM HORMONE: CPT | Performed by: GENERAL PRACTICE

## 2018-01-23 PROCEDURE — 36415 COLL VENOUS BLD VENIPUNCTURE: CPT

## 2018-01-23 PROCEDURE — 82043 UR ALBUMIN QUANTITATIVE: CPT | Performed by: GENERAL PRACTICE

## 2018-01-23 PROCEDURE — 83036 HEMOGLOBIN GLYCOSYLATED A1C: CPT | Performed by: GENERAL PRACTICE

## 2018-01-23 PROCEDURE — 86803 HEPATITIS C AB TEST: CPT | Performed by: GENERAL PRACTICE

## 2018-01-23 PROCEDURE — 80061 LIPID PANEL: CPT | Performed by: GENERAL PRACTICE

## 2018-01-23 PROCEDURE — 80053 COMPREHEN METABOLIC PANEL: CPT | Performed by: GENERAL PRACTICE

## 2018-01-30 DIAGNOSIS — Z00.00 HEALTHCARE MAINTENANCE: ICD-10-CM

## 2018-01-31 RX ORDER — AVOBENZONE, HOMOSALATE, OCTISALATE, OCTOCRYLENE 30; 100; 50; 25 MG/ML; MG/ML; MG/ML; MG/ML
SPRAY TOPICAL
Qty: 30 TABLET | Refills: 5 | Status: SHIPPED | OUTPATIENT
Start: 2018-01-31 | End: 2018-08-10 | Stop reason: SDUPTHER

## 2018-02-07 DIAGNOSIS — L21.9 SEBORRHEIC DERMATITIS: ICD-10-CM

## 2018-02-07 RX ORDER — TRIAMCINOLONE ACETONIDE 1 MG/ML
LOTION TOPICAL
Qty: 120 ML | Refills: 5 | Status: SHIPPED | OUTPATIENT
Start: 2018-02-07 | End: 2019-06-17 | Stop reason: SDUPTHER

## 2018-02-26 DIAGNOSIS — I10 ESSENTIAL HYPERTENSION: ICD-10-CM

## 2018-02-26 DIAGNOSIS — K21.9 GASTRO-ESOPHAGEAL REFLUX DISEASE WITHOUT ESOPHAGITIS: ICD-10-CM

## 2018-02-26 RX ORDER — PANTOPRAZOLE SODIUM 40 MG/1
TABLET, DELAYED RELEASE ORAL
Qty: 30 TABLET | Refills: 5 | Status: SHIPPED | OUTPATIENT
Start: 2018-02-26 | End: 2018-08-20 | Stop reason: SDUPTHER

## 2018-02-26 RX ORDER — LISINOPRIL 10 MG/1
TABLET ORAL
Qty: 30 TABLET | Refills: 5 | Status: SHIPPED | OUTPATIENT
Start: 2018-02-26 | End: 2018-08-20 | Stop reason: SDUPTHER

## 2018-03-02 ENCOUNTER — OFFICE VISIT (OUTPATIENT)
Dept: FAMILY MEDICINE CLINIC | Facility: CLINIC | Age: 63
End: 2018-03-02

## 2018-03-02 VITALS
DIASTOLIC BLOOD PRESSURE: 70 MMHG | HEART RATE: 89 BPM | SYSTOLIC BLOOD PRESSURE: 115 MMHG | BODY MASS INDEX: 28.06 KG/M2 | WEIGHT: 196 LBS | HEIGHT: 70 IN | OXYGEN SATURATION: 97 % | TEMPERATURE: 98.6 F | RESPIRATION RATE: 12 BRPM

## 2018-03-02 DIAGNOSIS — J30.2 CHRONIC SEASONAL ALLERGIC RHINITIS, UNSPECIFIED TRIGGER: ICD-10-CM

## 2018-03-02 DIAGNOSIS — R35.0 URINARY FREQUENCY: ICD-10-CM

## 2018-03-02 DIAGNOSIS — E11.40 TYPE 2 DIABETES MELLITUS WITH DIABETIC NEUROPATHY, WITHOUT LONG-TERM CURRENT USE OF INSULIN (HCC): ICD-10-CM

## 2018-03-02 DIAGNOSIS — E78.2 MIXED HYPERLIPIDEMIA: ICD-10-CM

## 2018-03-02 DIAGNOSIS — Z72.0 DIPS TOBACCO: ICD-10-CM

## 2018-03-02 DIAGNOSIS — K21.9 GASTRO-ESOPHAGEAL REFLUX DISEASE WITHOUT ESOPHAGITIS: ICD-10-CM

## 2018-03-02 DIAGNOSIS — Z00.00 HEALTHCARE MAINTENANCE: ICD-10-CM

## 2018-03-02 DIAGNOSIS — I10 ESSENTIAL HYPERTENSION: Primary | ICD-10-CM

## 2018-03-02 PROCEDURE — 99214 OFFICE O/P EST MOD 30 MIN: CPT | Performed by: GENERAL PRACTICE

## 2018-03-02 NOTE — PROGRESS NOTES
Subjective   Ed Villalba is a 62 y.o. male.     History of Present Illness     Urinary Frequency  With the increased dose of myrbetriq he has noted an improvement in his nocturia but continues to complain of daytime frequency.  He has been having to void every several hours on average.  There is no history of any difficulty voiding, urgency, dysuria, or hematuria.      Diabetes  Current symptoms include polyuria. Patient denies paresthesia of the feet, visual disturbances, polydipsia, hypoglycemia and foot ulcerations. Evaluation to date has been: hemoglobin A1C. Home sugars: patient does not check sugars. Current treatments: metformin.  Metformin tends to bother him if he takes it regularly and he occasionally halves or skips it.  Last dilated eye exam 10/26/16.  Most recent hemoglobin A1c   Lab Results   Component Value Date    HGBA1C 7.50 (H) 01/23/2018      Dyslipidemia  Compliance with treatment has been excellent. The patient exercises daily. He is currently being prescribed the following medication for his dyslipidemia - atorvastatin. Patient denies side effects associated with his medications. Most recent lipid profile  Lab Results   Component Value Date    CHOL 68 01/23/2018    CHLPL 88 05/02/2016    TRIG 101 01/23/2018    HDL 25 (L) 01/23/2018    LDL 23 01/23/2018     Hypertension  Home blood pressure readings: are consistently at goal. Associated signs and symptoms: none. Patient denies: chest pain, palpitations, dyspnea, orthopnea, paroxysmal nocturnal dyspnea and peripheral edema. Current antihypertensive medications includes lisinopril. Medication compliance: taking as prescribed.   Lab Results   Component Value Date    CREATININE 1.01 01/23/2018     Chronic Allergic Rhinitis  Patient has a history of allergic rhinitis. Patient's symptoms include sneezing, clear rhinorrhea, nasal congestion and postnasal drip. These symptoms are perennial with seasonal exacerbation. The patient has been suffering from  these symptoms for a number of years . The patient has tried loratadine with fair relief of symptoms.     The following portions of the patient's history were reviewed and updated as appropriate: allergies, current medications, past medical history, past social history and problem list.    Review of Systems   Constitutional: Positive for fatigue. Negative for appetite change, chills, fever and unexpected weight change.   HENT: Positive for rhinorrhea and sneezing. Negative for congestion, ear pain, postnasal drip, sinus pressure, sore throat and voice change.    Eyes: Negative for visual disturbance.   Respiratory: Negative for cough, shortness of breath and wheezing.    Cardiovascular: Negative for chest pain, palpitations and leg swelling.   Gastrointestinal: Negative for abdominal pain, blood in stool, constipation, diarrhea, nausea and vomiting.   Endocrine: Positive for polyuria. Negative for polydipsia.   Genitourinary: Positive for frequency. Negative for difficulty urinating, dysuria, hematuria and urgency.        Nocturia × 1-2 at present   Musculoskeletal: Positive for arthralgias. Negative for back pain, joint swelling, myalgias and neck pain.   Skin: Negative for color change.   Neurological: Negative for tremors, weakness, numbness and headaches.   Psychiatric/Behavioral: Negative for dysphoric mood, sleep disturbance and suicidal ideas. The patient is not nervous/anxious.      Objective   Physical Exam   Constitutional: He is oriented to person, place, and time. He appears well-developed and well-nourished. He is cooperative. He does not have a sickly appearance. No distress.   Bright, in good spirits.  No apparent distress.  No pallor, cyanosis, diaphoresis, or jaundice   HENT:   Head: Atraumatic.   Right Ear: Tympanic membrane, external ear and ear canal normal.   Left Ear: Tympanic membrane, external ear and ear canal normal.   Mouth/Throat: Oropharynx is clear and moist. Dental caries present.    Eyes: EOM are normal. Pupils are equal, round, and reactive to light. No scleral icterus.   Neck: No JVD present. Carotid bruit is not present. No tracheal deviation present. No thyromegaly present.   Cardiovascular: Normal rate, regular rhythm, S1 normal, S2 normal, normal heart sounds and intact distal pulses.  Exam reveals no gallop.    No murmur heard.  Pulmonary/Chest: Breath sounds normal. He has no wheezes. He has no rales.   Abdominal: Soft. Normal aorta and bowel sounds are normal. He exhibits no abdominal bruit and no mass. There is no hepatosplenomegaly. There is no tenderness. No hernia.   Musculoskeletal: He exhibits no tenderness or deformity.   Lymphadenopathy:        Head (right side): No submandibular adenopathy present.        Head (left side): No submandibular adenopathy present.     He has no cervical adenopathy.   Neurological: He is alert and oriented to person, place, and time. He has normal strength and normal reflexes. He displays normal reflexes. A sensory deficit (decreased vibration sense both feet) is present. No cranial nerve deficit. He exhibits normal muscle tone. Coordination normal.   Skin: Skin is warm and dry. No rash noted. He is not diaphoretic. No pallor. Nails show no clubbing.   Psychiatric: He has a normal mood and affect. His behavior is normal.     Assessment/Plan   Problems Addressed this Visit        Cardiovascular and Mediastinum    Essential hypertension  Encouraged to continue to work on his diet and exercise plan.  Continue current medication    Mixed hyperlipidemia  As above.   Continue current medication.       Respiratory    Chronic seasonal allergic rhinitis  Reminded regarding allergen avoidance.  Continue current medication       Digestive    Gastro-esophageal reflux disease without esophagitis       Endocrine    Type 2 diabetes mellitus with diabetic neuropathy, without long-term current use of insulin  Diabetes mellitus Type II, under fair control.    Encouraged to continue to pursue ADA diet  Encouraged aerobic exercise.  Added DPP inhibitor; see medication orders.  Reminded to get yearly retinal exam.  Updated labs will be drawn at his return in 3 months     Relevant Medications    Linagliptin-Metformin HCl (JENTADUETO) 2.5-500 MG tablet       Genitourinary    Urinary frequency  Possibly exacerbated by hyperglycemia  Patient uninterested in any further workup at present   Continue current medication       Other    Dips tobacco  Lengthy discussion regarding the importance of tobacco cessation    Healthcare maintenance  Reviewed the potential benefits of shingrix. Prescription written.  Patient remains uninterested in a screening colonoscopy     Relevant Medications    Zoster Vac Recomb Adjuvanted (SHINGRIX) 50 MCG reconstituted suspension

## 2018-03-06 ENCOUNTER — PRIOR AUTHORIZATION (OUTPATIENT)
Dept: FAMILY MEDICINE CLINIC | Facility: CLINIC | Age: 63
End: 2018-03-06

## 2018-03-07 RX ORDER — ALOGLIPTIN AND METFORMIN HYDROCHLORIDE 12.5; 5 MG/1; MG/1
1 TABLET, FILM COATED ORAL 2 TIMES DAILY
Qty: 60 TABLET | Refills: 5 | Status: SHIPPED | OUTPATIENT
Start: 2018-03-07 | End: 2018-08-10 | Stop reason: SDUPTHER

## 2018-03-08 ENCOUNTER — TELEPHONE (OUTPATIENT)
Dept: FAMILY MEDICINE CLINIC | Facility: CLINIC | Age: 63
End: 2018-03-08

## 2018-03-08 NOTE — TELEPHONE ENCOUNTER
Left pt a voicemail    ----- Message from Dwight Borges MD sent at 3/7/2018  4:43 PM EST -----  Emailed script for alogliptin with metformin - its all that he should have to take for his DM    ----- Message -----     From: Patriac Maldonado MA     Sent: 3/7/2018  12:36 PM       To: Dwight Borges MD    Pharmacy requested a PA for the [facility]. He has to try Alogliptin first.

## 2018-03-15 RX ORDER — LORATADINE 10 MG/1
TABLET ORAL
Qty: 30 TABLET | Refills: 5 | Status: SHIPPED | OUTPATIENT
Start: 2018-03-15 | End: 2018-09-27 | Stop reason: SDUPTHER

## 2018-06-11 RX ORDER — ACETAMINOPHEN/DIPHENHYDRAMINE 500MG-25MG
TABLET ORAL
Qty: 30 TABLET | Refills: 4 | Status: SHIPPED | OUTPATIENT
Start: 2018-06-11 | End: 2018-09-28 | Stop reason: SDUPTHER

## 2018-06-28 ENCOUNTER — OFFICE VISIT (OUTPATIENT)
Dept: FAMILY MEDICINE CLINIC | Facility: CLINIC | Age: 63
End: 2018-06-28

## 2018-06-28 VITALS
HEIGHT: 70 IN | BODY MASS INDEX: 26.2 KG/M2 | WEIGHT: 183 LBS | RESPIRATION RATE: 12 BRPM | OXYGEN SATURATION: 97 % | DIASTOLIC BLOOD PRESSURE: 65 MMHG | HEART RATE: 96 BPM | TEMPERATURE: 99.3 F | SYSTOLIC BLOOD PRESSURE: 115 MMHG

## 2018-06-28 DIAGNOSIS — R35.0 URINARY FREQUENCY: ICD-10-CM

## 2018-06-28 DIAGNOSIS — J30.2 CHRONIC SEASONAL ALLERGIC RHINITIS, UNSPECIFIED TRIGGER: ICD-10-CM

## 2018-06-28 DIAGNOSIS — I10 ESSENTIAL HYPERTENSION: Primary | ICD-10-CM

## 2018-06-28 DIAGNOSIS — K21.9 GASTRO-ESOPHAGEAL REFLUX DISEASE WITHOUT ESOPHAGITIS: ICD-10-CM

## 2018-06-28 DIAGNOSIS — Z72.0 DIPS TOBACCO: ICD-10-CM

## 2018-06-28 DIAGNOSIS — E11.40 TYPE 2 DIABETES MELLITUS WITH DIABETIC NEUROPATHY, WITHOUT LONG-TERM CURRENT USE OF INSULIN (HCC): ICD-10-CM

## 2018-06-28 DIAGNOSIS — E78.2 MIXED HYPERLIPIDEMIA: ICD-10-CM

## 2018-06-28 DIAGNOSIS — Z00.00 HEALTHCARE MAINTENANCE: ICD-10-CM

## 2018-06-28 PROCEDURE — 99214 OFFICE O/P EST MOD 30 MIN: CPT | Performed by: GENERAL PRACTICE

## 2018-06-28 RX ORDER — MIRABEGRON 50 MG/1
TABLET, FILM COATED, EXTENDED RELEASE ORAL
Qty: 30 TABLET | Refills: 5 | OUTPATIENT
Start: 2018-06-28

## 2018-06-28 NOTE — PROGRESS NOTES
Subjective   Ed Villalba is a 63 y.o. male.     History of Present Illness     Urinary Frequency  There has been no change in his frequency or nocturia since last here.  He continues to deny any difficulty voiding, urgency, dysuria, or hematuria.  Remains on myrbetriq    Diabetes  Current symptoms include polyuria. Patient denies paresthesia of the feet, visual disturbances, polydipsia, hypoglycemia and foot ulcerations. Evaluation to date has been: hemoglobin A1C. Home sugars: patient does not check sugars. Current treatments - alogliptin/metformin.  In this formulation the latter does not appear to be bothering him. Last dilated eye exam 10/26/16.       Dyslipidemia  Compliance with treatment has been excellent. The patient exercises daily. He is currently being prescribed the following medication for his dyslipidemia - atorvastatin. Patient denies side effects associated with his medications.     Hypertension  Home blood pressure readings: are consistently at goal. Associated signs and symptoms: none. Patient denies: chest pain, palpitations, dyspnea, orthopnea, paroxysmal nocturnal dyspnea and peripheral edema. Current antihypertensive medications includes - lisinopril. Medication compliance: taking as prescribed.     Chronic Allergic Rhinitis  Patient has a history of allergic rhinitis. Patient's symptoms include sneezing, clear rhinorrhea, nasal congestion and postnasal drip. These symptoms are perennial with seasonal exacerbation. The patient has been suffering from these symptoms for a number of years . The patient has tried loratadine with fair relief of symptoms.     The following portions of the patient's history were reviewed and updated as appropriate: allergies, current medications, past medical history, past social history and problem list.    Review of Systems   Constitutional: Positive for fatigue. Negative for appetite change, chills, fever and unexpected weight change.   HENT: Positive for  rhinorrhea and sneezing. Negative for congestion, ear pain, postnasal drip, sinus pressure, sore throat and voice change.    Eyes: Negative for visual disturbance.   Respiratory: Negative for cough, shortness of breath and wheezing.    Cardiovascular: Negative for chest pain, palpitations and leg swelling.   Gastrointestinal: Negative for abdominal pain, blood in stool, constipation, diarrhea, nausea and vomiting.   Endocrine: Positive for polyuria. Negative for polydipsia.   Genitourinary: Positive for frequency. Negative for difficulty urinating, dysuria, hematuria and urgency.        Nocturia × 1-2 at present   Musculoskeletal: Positive for arthralgias. Negative for back pain, joint swelling, myalgias and neck pain.   Skin: Negative for color change.   Neurological: Negative for tremors, weakness, numbness and headaches.   Psychiatric/Behavioral: Negative for dysphoric mood, sleep disturbance and suicidal ideas. The patient is not nervous/anxious.      Objective   Physical Exam   Constitutional: He is oriented to person, place, and time. He appears well-developed and well-nourished. He is cooperative. He does not have a sickly appearance. No distress.   Bright, in good spirits.  No apparent distress.  No pallor, cyanosis, diaphoresis, or jaundice   HENT:   Head: Atraumatic.   Right Ear: Tympanic membrane, external ear and ear canal normal.   Left Ear: Tympanic membrane, external ear and ear canal normal.   Mouth/Throat: Oropharynx is clear and moist. Dental caries present.   Eyes: EOM are normal. Pupils are equal, round, and reactive to light. No scleral icterus.   Neck: No JVD present. Carotid bruit is not present. No tracheal deviation present. No thyromegaly present.   Cardiovascular: Normal rate, regular rhythm, S1 normal, S2 normal, normal heart sounds and intact distal pulses.  Exam reveals no gallop.    No murmur heard.  Pulmonary/Chest: Breath sounds normal. He has no wheezes. He has no rales.    Abdominal: Soft. Normal aorta and bowel sounds are normal. He exhibits no abdominal bruit and no mass. There is no hepatosplenomegaly. There is no tenderness. No hernia.   Musculoskeletal: He exhibits no tenderness or deformity.   Lymphadenopathy:        Head (right side): No submandibular adenopathy present.        Head (left side): No submandibular adenopathy present.     He has no cervical adenopathy.   Neurological: He is alert and oriented to person, place, and time. He has normal strength and normal reflexes. He displays normal reflexes. A sensory deficit (decreased vibration sense both feet) is present. No cranial nerve deficit. He exhibits normal muscle tone. Coordination normal.   Skin: Skin is warm and dry. No rash noted. He is not diaphoretic. No pallor. Nails show no clubbing.   Psychiatric: He has a normal mood and affect. His behavior is normal.     Assessment/Plan   Problems Addressed this Visit        Cardiovascular and Mediastinum    Essential hypertension   Encouraged to continue to work on his diet and exercise plan.  Continue current medication  Updated labs will be drawn in 6 weeks     Relevant Orders    CBC & Differential    Comprehensive Metabolic Panel    Mixed hyperlipidemia  As above.   Continue current medication.    Relevant Orders    Lipid Panel    TSH       Respiratory    Chronic seasonal allergic rhinitis       Digestive    Gastro-esophageal reflux disease without esophagitis       Endocrine    Type 2 diabetes mellitus with diabetic neuropathy, without long-term current use of insulin  Diabetes mellitus Type II, under excellent control.   Encouraged to continue to pursue ADA diet  Encouraged aerobic exercise.  We'll arrange an updated diabetic eye exam    Relevant Orders    Hemoglobin A1c    MicroAlbumin, Urine, Random - Urine, Clean Catch    Ambulatory Referral for Diabetic Eye Exam-Optometry       Genitourinary    Urinary frequency  Chronic.  Stable.  Continue current medication        Other    Dips tobacco    Healthcare maintenance  Patient remains uninterested in cancer screening

## 2018-07-02 DIAGNOSIS — R35.0 URINARY FREQUENCY: ICD-10-CM

## 2018-07-02 RX ORDER — MIRABEGRON 50 MG/1
TABLET, FILM COATED, EXTENDED RELEASE ORAL
Qty: 30 TABLET | Refills: 5 | Status: SHIPPED | OUTPATIENT
Start: 2018-07-02 | End: 2019-01-04 | Stop reason: SDUPTHER

## 2018-07-05 RX ORDER — ATORVASTATIN CALCIUM 20 MG/1
TABLET, FILM COATED ORAL
Qty: 30 TABLET | Refills: 5 | Status: SHIPPED | OUTPATIENT
Start: 2018-07-05 | End: 2018-09-28

## 2018-07-10 RX ORDER — ATORVASTATIN CALCIUM 20 MG/1
TABLET, FILM COATED ORAL
Qty: 30 TABLET | Refills: 5 | Status: SHIPPED | OUTPATIENT
Start: 2018-07-10 | End: 2018-12-28 | Stop reason: SDUPTHER

## 2018-08-10 ENCOUNTER — LAB (OUTPATIENT)
Dept: FAMILY MEDICINE CLINIC | Facility: CLINIC | Age: 63
End: 2018-08-10

## 2018-08-10 DIAGNOSIS — Z00.00 HEALTHCARE MAINTENANCE: ICD-10-CM

## 2018-08-10 DIAGNOSIS — E11.40 TYPE 2 DIABETES MELLITUS WITH DIABETIC NEUROPATHY, WITHOUT LONG-TERM CURRENT USE OF INSULIN (HCC): ICD-10-CM

## 2018-08-10 DIAGNOSIS — I10 ESSENTIAL HYPERTENSION: ICD-10-CM

## 2018-08-10 DIAGNOSIS — E78.2 MIXED HYPERLIPIDEMIA: ICD-10-CM

## 2018-08-10 LAB
ALBUMIN SERPL-MCNC: 4 G/DL (ref 3.4–4.8)
ALBUMIN UR-MCNC: 6.4 MG/L
ALBUMIN/GLOB SERPL: 1.3 G/DL (ref 1.5–2.5)
ALP SERPL-CCNC: 53 U/L (ref 40–129)
ALT SERPL W P-5'-P-CCNC: 21 U/L (ref 10–44)
ANION GAP SERPL CALCULATED.3IONS-SCNC: 4.6 MMOL/L (ref 3.6–11.2)
AST SERPL-CCNC: 21 U/L (ref 10–34)
BASOPHILS # BLD AUTO: 0.02 10*3/MM3 (ref 0–0.3)
BASOPHILS NFR BLD AUTO: 0.3 % (ref 0–2)
BILIRUB SERPL-MCNC: 0.7 MG/DL (ref 0.2–1.8)
BUN BLD-MCNC: 8 MG/DL (ref 7–21)
BUN/CREAT SERPL: 8.9 (ref 7–25)
CALCIUM SPEC-SCNC: 9 MG/DL (ref 7.7–10)
CHLORIDE SERPL-SCNC: 108 MMOL/L (ref 99–112)
CHOLEST SERPL-MCNC: 76 MG/DL (ref 0–200)
CO2 SERPL-SCNC: 25.4 MMOL/L (ref 24.3–31.9)
CREAT BLD-MCNC: 0.9 MG/DL (ref 0.43–1.29)
DEPRECATED RDW RBC AUTO: 45.7 FL (ref 37–54)
EOSINOPHIL # BLD AUTO: 0.7 10*3/MM3 (ref 0–0.7)
EOSINOPHIL NFR BLD AUTO: 11.4 % (ref 0–5)
ERYTHROCYTE [DISTWIDTH] IN BLOOD BY AUTOMATED COUNT: 14 % (ref 11.5–14.5)
GFR SERPL CREATININE-BSD FRML MDRD: 85 ML/MIN/1.73
GLOBULIN UR ELPH-MCNC: 3 GM/DL
GLUCOSE BLD-MCNC: 164 MG/DL (ref 70–110)
HBA1C MFR BLD: 6.4 % (ref 4.5–5.7)
HCT VFR BLD AUTO: 44.3 % (ref 42–52)
HDLC SERPL-MCNC: 30 MG/DL (ref 60–100)
HGB BLD-MCNC: 15.5 G/DL (ref 14–18)
IMM GRANULOCYTES # BLD: 0.02 10*3/MM3 (ref 0–0.03)
IMM GRANULOCYTES NFR BLD: 0.3 % (ref 0–0.5)
LDLC SERPL CALC-MCNC: 14 MG/DL (ref 0–100)
LDLC/HDLC SERPL: 0.48 {RATIO}
LYMPHOCYTES # BLD AUTO: 1.74 10*3/MM3 (ref 1–3)
LYMPHOCYTES NFR BLD AUTO: 28.4 % (ref 21–51)
MCH RBC QN AUTO: 31.6 PG (ref 27–33)
MCHC RBC AUTO-ENTMCNC: 35 G/DL (ref 33–37)
MCV RBC AUTO: 90.2 FL (ref 80–94)
MONOCYTES # BLD AUTO: 0.37 10*3/MM3 (ref 0.1–0.9)
MONOCYTES NFR BLD AUTO: 6 % (ref 0–10)
NEUTROPHILS # BLD AUTO: 3.28 10*3/MM3 (ref 1.4–6.5)
NEUTROPHILS NFR BLD AUTO: 53.6 % (ref 30–70)
OSMOLALITY SERPL CALC.SUM OF ELEC: 277.6 MOSM/KG (ref 273–305)
PLATELET # BLD AUTO: 196 10*3/MM3 (ref 130–400)
PMV BLD AUTO: 10.2 FL (ref 6–10)
POTASSIUM BLD-SCNC: 3.9 MMOL/L (ref 3.5–5.3)
PROT SERPL-MCNC: 7 G/DL (ref 6–8)
RBC # BLD AUTO: 4.91 10*6/MM3 (ref 4.7–6.1)
SODIUM BLD-SCNC: 138 MMOL/L (ref 135–153)
TRIGL SERPL-MCNC: 158 MG/DL (ref 0–150)
TSH SERPL DL<=0.05 MIU/L-ACNC: 1.56 MIU/ML (ref 0.55–4.78)
VLDLC SERPL-MCNC: 31.6 MG/DL
WBC NRBC COR # BLD: 6.13 10*3/MM3 (ref 4.5–12.5)

## 2018-08-10 PROCEDURE — 82043 UR ALBUMIN QUANTITATIVE: CPT | Performed by: GENERAL PRACTICE

## 2018-08-10 PROCEDURE — 85025 COMPLETE CBC W/AUTO DIFF WBC: CPT | Performed by: GENERAL PRACTICE

## 2018-08-10 PROCEDURE — 80053 COMPREHEN METABOLIC PANEL: CPT | Performed by: GENERAL PRACTICE

## 2018-08-10 PROCEDURE — 83036 HEMOGLOBIN GLYCOSYLATED A1C: CPT | Performed by: GENERAL PRACTICE

## 2018-08-10 PROCEDURE — 84443 ASSAY THYROID STIM HORMONE: CPT | Performed by: GENERAL PRACTICE

## 2018-08-10 PROCEDURE — 36415 COLL VENOUS BLD VENIPUNCTURE: CPT

## 2018-08-10 PROCEDURE — 80061 LIPID PANEL: CPT | Performed by: GENERAL PRACTICE

## 2018-08-12 RX ORDER — AVOBENZONE, HOMOSALATE, OCTISALATE, OCTOCRYLENE 30; 100; 50; 25 MG/ML; MG/ML; MG/ML; MG/ML
SPRAY TOPICAL
Qty: 30 TABLET | Refills: 5 | Status: SHIPPED | OUTPATIENT
Start: 2018-08-12 | End: 2019-03-04 | Stop reason: SDUPTHER

## 2018-08-12 RX ORDER — ALOGLIPTIN AND METFORMIN HYDROCHLORIDE 12.5; 5 MG/1; MG/1
TABLET, FILM COATED ORAL
Qty: 60 TABLET | Refills: 5 | Status: SHIPPED | OUTPATIENT
Start: 2018-08-12 | End: 2019-03-20 | Stop reason: SDUPTHER

## 2018-08-20 DIAGNOSIS — I10 ESSENTIAL HYPERTENSION: ICD-10-CM

## 2018-08-20 DIAGNOSIS — K21.9 GASTRO-ESOPHAGEAL REFLUX DISEASE WITHOUT ESOPHAGITIS: ICD-10-CM

## 2018-08-20 RX ORDER — PANTOPRAZOLE SODIUM 40 MG/1
TABLET, DELAYED RELEASE ORAL
Qty: 30 TABLET | Refills: 5 | Status: SHIPPED | OUTPATIENT
Start: 2018-08-20 | End: 2019-01-21 | Stop reason: SDUPTHER

## 2018-08-20 RX ORDER — LISINOPRIL 10 MG/1
TABLET ORAL
Qty: 30 TABLET | Refills: 5 | Status: SHIPPED | OUTPATIENT
Start: 2018-08-20 | End: 2019-02-20 | Stop reason: SDUPTHER

## 2018-09-27 RX ORDER — LORATADINE 10 MG/1
TABLET ORAL
Qty: 30 TABLET | Refills: 5 | Status: SHIPPED | OUTPATIENT
Start: 2018-09-27 | End: 2018-09-28 | Stop reason: SDUPTHER

## 2018-09-28 ENCOUNTER — OFFICE VISIT (OUTPATIENT)
Dept: FAMILY MEDICINE CLINIC | Facility: CLINIC | Age: 63
End: 2018-09-28

## 2018-09-28 DIAGNOSIS — J30.2 CHRONIC SEASONAL ALLERGIC RHINITIS, UNSPECIFIED TRIGGER: ICD-10-CM

## 2018-09-28 DIAGNOSIS — R35.0 URINARY FREQUENCY: ICD-10-CM

## 2018-09-28 DIAGNOSIS — Z00.00 HEALTHCARE MAINTENANCE: ICD-10-CM

## 2018-09-28 DIAGNOSIS — K21.9 GASTRO-ESOPHAGEAL REFLUX DISEASE WITHOUT ESOPHAGITIS: ICD-10-CM

## 2018-09-28 DIAGNOSIS — I10 ESSENTIAL HYPERTENSION: Primary | ICD-10-CM

## 2018-09-28 DIAGNOSIS — E78.2 MIXED HYPERLIPIDEMIA: ICD-10-CM

## 2018-09-28 DIAGNOSIS — Z72.0 DIPS TOBACCO: ICD-10-CM

## 2018-09-28 DIAGNOSIS — E11.40 TYPE 2 DIABETES MELLITUS WITH DIABETIC NEUROPATHY, WITHOUT LONG-TERM CURRENT USE OF INSULIN (HCC): ICD-10-CM

## 2018-09-28 PROCEDURE — 99214 OFFICE O/P EST MOD 30 MIN: CPT | Performed by: GENERAL PRACTICE

## 2018-09-28 RX ORDER — LORATADINE 10 MG/1
10 TABLET ORAL DAILY
Qty: 30 TABLET | Refills: 5 | Status: SHIPPED | OUTPATIENT
Start: 2018-09-28 | End: 2019-10-07 | Stop reason: SDUPTHER

## 2018-09-28 RX ORDER — ASPIRIN 81 MG/1
81 TABLET ORAL DAILY
Qty: 30 TABLET | Refills: 5 | Status: SHIPPED | OUTPATIENT
Start: 2018-09-28 | End: 2019-05-17 | Stop reason: SDUPTHER

## 2018-09-28 NOTE — PROGRESS NOTES
Subjective   Ed Villalba is a 63 y.o. male.     History of Present Illness     Urinary Frequency  There has been no change in his frequency or nocturia since last here.  He continues to deny any difficulty voiding, urgency, dysuria, or hematuria.  Remains on myrbetriq with intermittent hesitancy the only apparent side effect    Diabetes  Current symptoms include polyuria. Patient denies paresthesia of the feet, visual disturbances, polydipsia, hypoglycemia and foot ulcerations. Evaluation to date has been: hemoglobin A1C. Home sugars: patient does not check sugars. Current treatments - alogliptin/metformin.  In this formulation and taking only one daily the latter does not appear to be bothering him. Last dilated eye exam 10/26/16.    Lab Results   Component Value Date    HGBA1C 6.40 (H) 08/10/2018      Dyslipidemia  Compliance with treatment has been excellent. The patient exercises daily. He is currently being prescribed the following medication for his dyslipidemia - atorvastatin. Patient denies side effects associated with his medications.   Lab Results   Component Value Date    CHOL 76 08/10/2018    CHLPL 88 05/02/2016    TRIG 158 (H) 08/10/2018    HDL 30 (L) 08/10/2018    LDL 14 08/10/2018     Hypertension  Home blood pressure readings: are consistently at goal. Associated signs and symptoms: none. Patient denies: chest pain, palpitations, dyspnea, orthopnea, paroxysmal nocturnal dyspnea and peripheral edema. Current antihypertensive medications includes - lisinopril. Medication compliance: taking as prescribed.   Lab Results   Component Value Date    CREATININE 0.90 08/10/2018     Chronic Allergic Rhinitis  Patient has a history of allergic rhinitis. Patient's symptoms include sneezing, clear rhinorrhea, nasal congestion and postnasal drip. These symptoms are perennial with seasonal exacerbation. The patient has been suffering from these symptoms for a number of years . The patient has tried loratadine with  fair relief of symptoms.     The following portions of the patient's history were reviewed and updated as appropriate: allergies, current medications, past medical history, past social history and problem list.    Review of Systems   Constitutional: Positive for fatigue. Negative for appetite change, chills, fever and unexpected weight change.   HENT: Positive for rhinorrhea and sneezing. Negative for congestion, ear pain, postnasal drip, sinus pressure, sore throat and voice change.    Eyes: Negative for visual disturbance.   Respiratory: Negative for cough, shortness of breath and wheezing.    Cardiovascular: Negative for chest pain, palpitations and leg swelling.   Gastrointestinal: Negative for abdominal pain, blood in stool, constipation, diarrhea, nausea and vomiting.   Endocrine: Positive for polyuria. Negative for polydipsia.   Genitourinary: Positive for difficulty urinating (intermittent hesitancy) and frequency. Negative for dysuria, hematuria and urgency.        Nocturia × 1-4 at present   Musculoskeletal: Positive for arthralgias. Negative for back pain, joint swelling, myalgias and neck pain.   Skin: Negative for color change.   Neurological: Negative for tremors, weakness, numbness and headaches.   Psychiatric/Behavioral: Negative for dysphoric mood, sleep disturbance and suicidal ideas. The patient is not nervous/anxious.      Objective   Physical Exam   Constitutional: He is oriented to person, place, and time. He appears well-developed and well-nourished. He is cooperative. He does not have a sickly appearance. No distress.   Bright, in good spirits.  No apparent distress.  No pallor, cyanosis, diaphoresis, or jaundice   HENT:   Head: Atraumatic.   Right Ear: Tympanic membrane, external ear and ear canal normal.   Left Ear: Tympanic membrane, external ear and ear canal normal.   Mouth/Throat: Oropharynx is clear and moist. Dental caries present.   Eyes: Pupils are equal, round, and reactive to  light. EOM are normal. No scleral icterus.   Neck: No JVD present. Carotid bruit is not present. No tracheal deviation present. No thyromegaly present.   Cardiovascular: Normal rate, regular rhythm, S1 normal, S2 normal, normal heart sounds and intact distal pulses.  Exam reveals no gallop.    No murmur heard.  Pulmonary/Chest: Breath sounds normal. He has no wheezes. He has no rales.   Abdominal: Soft. Normal aorta and bowel sounds are normal. He exhibits no abdominal bruit and no mass. There is no hepatosplenomegaly. There is no tenderness. No hernia.   Musculoskeletal: He exhibits no tenderness or deformity.   Lymphadenopathy:        Head (right side): No submandibular adenopathy present.        Head (left side): No submandibular adenopathy present.     He has no cervical adenopathy.   Neurological: He is alert and oriented to person, place, and time. He has normal strength and normal reflexes. He displays normal reflexes. A sensory deficit (decreased vibration sense both feet) is present. No cranial nerve deficit. He exhibits normal muscle tone. Coordination normal.   Skin: Skin is warm and dry. No rash noted. He is not diaphoretic. No pallor. Nails show no clubbing.   Psychiatric: He has a normal mood and affect. His behavior is normal.     Assessment/Plan   Problems Addressed this Visit        Cardiovascular and Mediastinum    Essential hypertension    Hypertension: at goal. Evidence of target organ damage: none.  Encouraged to continue to work on diet and exercise plan.   Continue current medication    Relevant Medications    aspirin (RA ASPIRIN EC) 81 MG EC tablet    Mixed hyperlipidemia  As above.   Continue current medication.       Respiratory    Chronic seasonal allergic rhinitis  Reminded regarding allergen avoidance.  Continue current medication    Relevant Medications    loratadine (CLARITIN) 10 MG tablet       Digestive    Gastro-esophageal reflux disease without esophagitis       Endocrine    Type 2  diabetes mellitus with diabetic neuropathy, without long-term current use of insulin (CMS/[facility])  Diabetes mellitus Type II, under excellent control.   Encouraged to continue to pursue ADA diet  Encouraged aerobic exercise.  Reminded to get yearly retinal exam.  Updated labs will be drawn at return.       Genitourinary    Urinary frequency  Chronic.   Improved with myrbetriq  Patient remains uninterested in a urology reassessment       Other    Dips tobacco    Healthcare maintenance  Patient uninterested in a flu shot

## 2018-09-29 VITALS
OXYGEN SATURATION: 97 % | BODY MASS INDEX: 27.06 KG/M2 | TEMPERATURE: 98.2 F | RESPIRATION RATE: 12 BRPM | DIASTOLIC BLOOD PRESSURE: 65 MMHG | HEIGHT: 70 IN | SYSTOLIC BLOOD PRESSURE: 120 MMHG | HEART RATE: 87 BPM | WEIGHT: 189 LBS

## 2018-10-29 DIAGNOSIS — E11.40 TYPE 2 DIABETES MELLITUS WITH DIABETIC NEUROPATHY (HCC): ICD-10-CM

## 2018-12-28 RX ORDER — ATORVASTATIN CALCIUM 20 MG/1
TABLET, FILM COATED ORAL
Qty: 30 TABLET | Refills: 5 | Status: SHIPPED | OUTPATIENT
Start: 2018-12-28 | End: 2019-07-05 | Stop reason: SDUPTHER

## 2019-01-04 DIAGNOSIS — R35.0 URINARY FREQUENCY: ICD-10-CM

## 2019-01-04 RX ORDER — MIRABEGRON 50 MG/1
TABLET, FILM COATED, EXTENDED RELEASE ORAL
Qty: 30 TABLET | Refills: 5 | Status: SHIPPED | OUTPATIENT
Start: 2019-01-04 | End: 2019-07-09 | Stop reason: SDUPTHER

## 2019-01-07 ENCOUNTER — OFFICE VISIT (OUTPATIENT)
Dept: FAMILY MEDICINE CLINIC | Facility: CLINIC | Age: 64
End: 2019-01-07

## 2019-01-07 VITALS
SYSTOLIC BLOOD PRESSURE: 120 MMHG | HEART RATE: 100 BPM | HEIGHT: 70 IN | WEIGHT: 193 LBS | OXYGEN SATURATION: 96 % | DIASTOLIC BLOOD PRESSURE: 60 MMHG | TEMPERATURE: 99.1 F | RESPIRATION RATE: 12 BRPM | BODY MASS INDEX: 27.63 KG/M2

## 2019-01-07 DIAGNOSIS — R35.0 URINARY FREQUENCY: ICD-10-CM

## 2019-01-07 DIAGNOSIS — E78.2 MIXED HYPERLIPIDEMIA: Primary | ICD-10-CM

## 2019-01-07 DIAGNOSIS — K21.9 GASTRO-ESOPHAGEAL REFLUX DISEASE WITHOUT ESOPHAGITIS: ICD-10-CM

## 2019-01-07 DIAGNOSIS — Z00.00 HEALTHCARE MAINTENANCE: ICD-10-CM

## 2019-01-07 DIAGNOSIS — E11.40 TYPE 2 DIABETES MELLITUS WITH DIABETIC NEUROPATHY, WITHOUT LONG-TERM CURRENT USE OF INSULIN (HCC): ICD-10-CM

## 2019-01-07 DIAGNOSIS — J30.2 CHRONIC SEASONAL ALLERGIC RHINITIS: ICD-10-CM

## 2019-01-07 DIAGNOSIS — I10 ESSENTIAL HYPERTENSION: ICD-10-CM

## 2019-01-07 DIAGNOSIS — Z72.0 DIPS TOBACCO: ICD-10-CM

## 2019-01-07 PROCEDURE — 99214 OFFICE O/P EST MOD 30 MIN: CPT | Performed by: GENERAL PRACTICE

## 2019-01-07 NOTE — PROGRESS NOTES
Subjective   Ed Villalba is a 63 y.o. male.     History of Present Illness     Urinary Frequency  There has been no change in his frequency or nocturia since last here.  He continues to deny any difficulty voiding, urgency, dysuria, or hematuria.  Remains on myrbetriq with intermittent hesitancy the only apparent side effect    Diabetes  Current symptoms include polyuria. Patient denies paresthesia of the feet, visual disturbances, polydipsia, hypoglycemia and foot ulcerations. Evaluation to date has been: hemoglobin A1C. Home sugars: Checking once every day or two and generally at goal. Current treatments - alogliptin/metformin.  In this formulation and taking only one daily the metformin does not appear to be bothering him. Last dilated eye exam 9/4/18.  He's had no recent labs     Dyslipidemia  Compliance with treatment has been excellent. The patient exercises daily. He is currently being prescribed the following medication for his dyslipidemia - atorvastatin. Patient denies side effects associated with his medications.     Hypertension  Home blood pressure readings: are consistently at goal. Associated signs and symptoms: none. Patient denies: chest pain, palpitations, dyspnea, orthopnea, paroxysmal nocturnal dyspnea and peripheral edema. Current antihypertensive medications includes - lisinopril. Medication compliance: taking as prescribed.     Chronic Allergic Rhinitis  Patient has a history of allergic rhinitis. Patient's symptoms include sneezing, clear rhinorrhea, nasal congestion and postnasal drip. These symptoms are perennial with seasonal exacerbation. The patient has been suffering from these symptoms for a number of years . The patient has tried loratadine with fair relief of symptoms.     The following portions of the patient's history were reviewed and updated as appropriate: allergies, current medications, past medical history, past social history and problem list.    Review of Systems    Constitutional: Positive for fatigue. Negative for appetite change, chills, fever and unexpected weight change.   HENT: Positive for rhinorrhea and sneezing. Negative for congestion, ear pain, postnasal drip, sinus pressure, sore throat and voice change.    Eyes: Negative for visual disturbance.   Respiratory: Negative for cough, shortness of breath and wheezing.    Cardiovascular: Negative for chest pain, palpitations and leg swelling.   Gastrointestinal: Negative for abdominal pain, blood in stool, constipation, diarrhea, nausea and vomiting.   Endocrine: Positive for polyuria. Negative for polydipsia.   Genitourinary: Positive for difficulty urinating (intermittent hesitancy) and frequency. Negative for dysuria, hematuria and urgency.        Nocturia × 1-4 at present   Musculoskeletal: Positive for arthralgias. Negative for back pain, joint swelling, myalgias and neck pain.   Skin: Negative for color change.   Neurological: Negative for tremors, weakness, numbness and headaches.   Psychiatric/Behavioral: Negative for dysphoric mood, sleep disturbance and suicidal ideas. The patient is not nervous/anxious.      Objective   Physical Exam   Constitutional: He is oriented to person, place, and time. He appears well-developed and well-nourished. He is cooperative. He does not have a sickly appearance. No distress.   Bright, in good spirits.  No apparent distress.  No pallor, cyanosis, diaphoresis, or jaundice   HENT:   Head: Atraumatic.   Right Ear: Tympanic membrane, external ear and ear canal normal.   Left Ear: Tympanic membrane, external ear and ear canal normal.   Mouth/Throat: Oropharynx is clear and moist. Dental caries present.   Eyes: EOM are normal. Pupils are equal, round, and reactive to light. No scleral icterus.   Neck: No JVD present. Carotid bruit is not present. No tracheal deviation present. No thyromegaly present.   Cardiovascular: Normal rate, regular rhythm, S1 normal, S2 normal, normal heart  sounds and intact distal pulses. Exam reveals no gallop.   No murmur heard.  Pulmonary/Chest: Breath sounds normal. He has no wheezes. He has no rales.   Abdominal: Soft. Normal aorta and bowel sounds are normal. He exhibits no abdominal bruit and no mass. There is no hepatosplenomegaly. There is no tenderness. No hernia.   Musculoskeletal: He exhibits no tenderness or deformity.    Ed had a diabetic foot exam performed today.  Lymphadenopathy:        Head (right side): No submandibular adenopathy present.        Head (left side): No submandibular adenopathy present.     He has no cervical adenopathy.   Neurological: He is alert and oriented to person, place, and time. He has normal strength and normal reflexes. He displays normal reflexes. A sensory deficit (decreased vibration sense both feet) is present. No cranial nerve deficit. He exhibits normal muscle tone. Coordination normal.   Skin: Skin is warm and dry. No rash noted. He is not diaphoretic. No pallor. Nails show no clubbing.   Psychiatric: He has a normal mood and affect. His behavior is normal.     Assessment/Plan   Problems Addressed this Visit        Cardiovascular and Mediastinum    Essential hypertension   Hypertension: at goal. Evidence of target organ damage: none.  Encouraged to continue to work on diet and exercise plan.   Continue current medication    Mixed hyperlipidemia  As above.   Continue current medication.       Respiratory    Chronic seasonal allergic rhinitis       Digestive    Gastro-esophageal reflux disease without esophagitis       Endocrine    Type 2 diabetes mellitus with diabetic neuropathy, without long-term current use of insulin (CMS/[facility])  Diabetes mellitus Type II, under excellent control.   Encouraged to continue to pursue ADA diet  Encouraged aerobic exercise.  Continue current medication  Updated labs will be drawn at return.       Genitourinary    Urinary frequency  Continue current medication  Encouraged to report  if any worse or if any new symptoms or concerns.       Other    Dips tobacco

## 2019-01-21 DIAGNOSIS — K21.9 GASTRO-ESOPHAGEAL REFLUX DISEASE WITHOUT ESOPHAGITIS: ICD-10-CM

## 2019-01-21 RX ORDER — PANTOPRAZOLE SODIUM 40 MG/1
TABLET, DELAYED RELEASE ORAL
Qty: 30 TABLET | Refills: 5 | Status: SHIPPED | OUTPATIENT
Start: 2019-01-21 | End: 2019-02-18 | Stop reason: SDUPTHER

## 2019-02-18 DIAGNOSIS — K21.9 GASTRO-ESOPHAGEAL REFLUX DISEASE WITHOUT ESOPHAGITIS: ICD-10-CM

## 2019-02-18 RX ORDER — PANTOPRAZOLE SODIUM 40 MG/1
TABLET, DELAYED RELEASE ORAL
Qty: 30 TABLET | Refills: 5 | Status: SHIPPED | OUTPATIENT
Start: 2019-02-18 | End: 2019-07-17 | Stop reason: SDUPTHER

## 2019-02-20 DIAGNOSIS — I10 ESSENTIAL HYPERTENSION: ICD-10-CM

## 2019-02-20 RX ORDER — LISINOPRIL 10 MG/1
TABLET ORAL
Qty: 30 TABLET | Refills: 5 | Status: SHIPPED | OUTPATIENT
Start: 2019-02-20 | End: 2019-07-17 | Stop reason: SDUPTHER

## 2019-03-04 DIAGNOSIS — Z00.00 HEALTHCARE MAINTENANCE: ICD-10-CM

## 2019-03-20 RX ORDER — ALOGLIPTIN AND METFORMIN HYDROCHLORIDE 12.5; 5 MG/1; MG/1
TABLET, FILM COATED ORAL
Qty: 60 TABLET | Refills: 5 | Status: SHIPPED | OUTPATIENT
Start: 2019-03-20 | End: 2019-07-01 | Stop reason: SDUPTHER

## 2019-04-11 ENCOUNTER — OFFICE VISIT (OUTPATIENT)
Dept: FAMILY MEDICINE CLINIC | Facility: CLINIC | Age: 64
End: 2019-04-11

## 2019-04-11 VITALS
RESPIRATION RATE: 12 BRPM | HEART RATE: 88 BPM | OXYGEN SATURATION: 96 % | WEIGHT: 193.6 LBS | HEIGHT: 70 IN | BODY MASS INDEX: 27.72 KG/M2 | SYSTOLIC BLOOD PRESSURE: 125 MMHG | DIASTOLIC BLOOD PRESSURE: 80 MMHG | TEMPERATURE: 98.2 F

## 2019-04-11 DIAGNOSIS — J30.2 CHRONIC SEASONAL ALLERGIC RHINITIS: ICD-10-CM

## 2019-04-11 DIAGNOSIS — E11.40 TYPE 2 DIABETES MELLITUS WITH DIABETIC NEUROPATHY, WITHOUT LONG-TERM CURRENT USE OF INSULIN (HCC): ICD-10-CM

## 2019-04-11 DIAGNOSIS — R35.0 URINARY FREQUENCY: ICD-10-CM

## 2019-04-11 DIAGNOSIS — Z72.0 DIPS TOBACCO: ICD-10-CM

## 2019-04-11 DIAGNOSIS — I10 ESSENTIAL HYPERTENSION: ICD-10-CM

## 2019-04-11 DIAGNOSIS — Z00.00 HEALTHCARE MAINTENANCE: ICD-10-CM

## 2019-04-11 DIAGNOSIS — E78.2 MIXED HYPERLIPIDEMIA: Primary | ICD-10-CM

## 2019-04-11 DIAGNOSIS — Z23 ENCOUNTER FOR IMMUNIZATION: ICD-10-CM

## 2019-04-11 DIAGNOSIS — K21.9 GASTRO-ESOPHAGEAL REFLUX DISEASE WITHOUT ESOPHAGITIS: ICD-10-CM

## 2019-04-11 LAB
ALBUMIN SERPL-MCNC: 4.2 G/DL (ref 3.5–5.2)
ALBUMIN UR-MCNC: 2.3 MG/L
ALBUMIN/GLOB SERPL: 1.2 G/DL
ALP SERPL-CCNC: 64 U/L (ref 39–117)
ALT SERPL W P-5'-P-CCNC: 11 U/L (ref 1–41)
ANION GAP SERPL CALCULATED.3IONS-SCNC: 15 MMOL/L
AST SERPL-CCNC: 19 U/L (ref 1–40)
BILIRUB SERPL-MCNC: 0.5 MG/DL (ref 0.2–1.2)
BUN BLD-MCNC: 11 MG/DL (ref 8–23)
BUN/CREAT SERPL: 12.2 (ref 7–25)
CALCIUM SPEC-SCNC: 9.2 MG/DL (ref 8.6–10.5)
CHLORIDE SERPL-SCNC: 104 MMOL/L (ref 98–107)
CHOLEST SERPL-MCNC: 76 MG/DL (ref 0–200)
CO2 SERPL-SCNC: 20 MMOL/L (ref 22–29)
CREAT BLD-MCNC: 0.9 MG/DL (ref 0.76–1.27)
GFR SERPL CREATININE-BSD FRML MDRD: 85 ML/MIN/1.73
GLOBULIN UR ELPH-MCNC: 3.5 GM/DL
GLUCOSE BLD-MCNC: 107 MG/DL (ref 65–99)
HBA1C MFR BLD: 6.7 % (ref 4.8–5.6)
HDLC SERPL-MCNC: 31 MG/DL (ref 40–60)
LDLC SERPL CALC-MCNC: 10 MG/DL (ref 0–100)
LDLC/HDLC SERPL: 0.32 {RATIO}
POTASSIUM BLD-SCNC: 4.3 MMOL/L (ref 3.5–5.2)
PROT SERPL-MCNC: 7.7 G/DL (ref 6–8.5)
SODIUM BLD-SCNC: 139 MMOL/L (ref 136–145)
TRIGL SERPL-MCNC: 175 MG/DL (ref 0–150)
VLDLC SERPL-MCNC: 35 MG/DL (ref 5–40)

## 2019-04-11 PROCEDURE — 83036 HEMOGLOBIN GLYCOSYLATED A1C: CPT | Performed by: GENERAL PRACTICE

## 2019-04-11 PROCEDURE — 80053 COMPREHEN METABOLIC PANEL: CPT | Performed by: GENERAL PRACTICE

## 2019-04-11 PROCEDURE — 82043 UR ALBUMIN QUANTITATIVE: CPT | Performed by: GENERAL PRACTICE

## 2019-04-11 PROCEDURE — 99214 OFFICE O/P EST MOD 30 MIN: CPT | Performed by: GENERAL PRACTICE

## 2019-04-11 PROCEDURE — 90471 IMMUNIZATION ADMIN: CPT | Performed by: GENERAL PRACTICE

## 2019-04-11 PROCEDURE — 80061 LIPID PANEL: CPT | Performed by: GENERAL PRACTICE

## 2019-04-11 PROCEDURE — 85007 BL SMEAR W/DIFF WBC COUNT: CPT | Performed by: GENERAL PRACTICE

## 2019-04-11 PROCEDURE — 85025 COMPLETE CBC W/AUTO DIFF WBC: CPT | Performed by: GENERAL PRACTICE

## 2019-04-11 PROCEDURE — 90632 HEPA VACCINE ADULT IM: CPT | Performed by: GENERAL PRACTICE

## 2019-04-11 NOTE — PROGRESS NOTES
Subjective   Ed Villalba is a 63 y.o. male.     History of Present Illness     Urinary Frequency  There has been no change in his frequency or nocturia since last here.  He continues to deny any difficulty voiding, urgency, dysuria, or hematuria.  Remains on myrbetriq with intermittent hesitancy the only apparent side effect    Diabetes  Current symptoms include polyuria. Patient denies paresthesia of the feet, visual disturbances, polydipsia, hypoglycemia and foot ulcerations. Evaluation to date has been: hemoglobin A1C. Home sugars: continues to check once every day or two and generally at goal. Current treatments - alogliptin/metformin.  He denies any apparent side effects.  Last dilated eye exam 9/4/18.       Dyslipidemia  Compliance with treatment has been excellent. The patient exercises daily. He is currently being prescribed the following medication for his dyslipidemia - atorvastatin. Patient denies side effects associated with his medications.     Hypertension  Home blood pressure readings: are consistently at goal. Associated signs and symptoms: none. Patient denies: chest pain, palpitations, dyspnea, orthopnea, paroxysmal nocturnal dyspnea and peripheral edema. Current antihypertensive medications includes - lisinopril. Medication compliance: taking as prescribed.     Chronic Allergic Rhinitis  Patient has a history of allergic rhinitis. Patient's symptoms include sneezing, clear rhinorrhea, nasal congestion and postnasal drip. These symptoms are perennial with seasonal exacerbation. The patient has been suffering from these symptoms for a number of years . The patient has tried loratadine with fair relief of symptoms.     The following portions of the patient's history were reviewed and updated as appropriate: allergies, current medications, past medical history, past social history and problem list.    Review of Systems   Constitutional: Positive for fatigue. Negative for appetite change, chills,  fever and unexpected weight change.   HENT: Positive for rhinorrhea and sneezing. Negative for congestion, ear pain, postnasal drip, sinus pressure, sore throat and voice change.    Eyes: Negative for visual disturbance.   Respiratory: Negative for cough, shortness of breath and wheezing.    Cardiovascular: Negative for chest pain, palpitations and leg swelling.   Gastrointestinal: Negative for abdominal pain, blood in stool, constipation, diarrhea, nausea and vomiting.   Endocrine: Positive for polyuria. Negative for polydipsia.   Genitourinary: Positive for difficulty urinating (intermittent hesitancy) and frequency. Negative for dysuria, hematuria and urgency.        Nocturia × 1-4 at present   Musculoskeletal: Positive for arthralgias. Negative for back pain, joint swelling, myalgias and neck pain.   Skin: Negative for color change.   Neurological: Negative for tremors, weakness, numbness and headaches.   Psychiatric/Behavioral: Negative for dysphoric mood, sleep disturbance and suicidal ideas. The patient is not nervous/anxious.      Objective   Physical Exam   Constitutional: He is oriented to person, place, and time. He appears well-developed and well-nourished. He is cooperative. He does not have a sickly appearance. No distress.   Bright, in good spirits.  No apparent distress.  No pallor, cyanosis, diaphoresis, or jaundice   HENT:   Head: Atraumatic.   Right Ear: Tympanic membrane, external ear and ear canal normal.   Left Ear: Tympanic membrane, external ear and ear canal normal.   Mouth/Throat: Oropharynx is clear and moist. Dental caries present.   Eyes: EOM are normal. Pupils are equal, round, and reactive to light. No scleral icterus.   Neck: No JVD present. Carotid bruit is not present. No tracheal deviation present. No thyromegaly present.   Cardiovascular: Normal rate, regular rhythm, S1 normal, S2 normal, normal heart sounds and intact distal pulses. Exam reveals no gallop.   No murmur  heard.  Pulmonary/Chest: Breath sounds normal. He has no wheezes. He has no rales.   Abdominal: Soft. Normal aorta and bowel sounds are normal. He exhibits no abdominal bruit and no mass. There is no hepatosplenomegaly. There is no tenderness. No hernia.   Musculoskeletal: He exhibits no tenderness or deformity.   Lymphadenopathy:        Head (right side): No submandibular adenopathy present.        Head (left side): No submandibular adenopathy present.     He has no cervical adenopathy.   Neurological: He is alert and oriented to person, place, and time. He has normal strength and normal reflexes. He displays normal reflexes. A sensory deficit (decreased vibration sense both feet) is present. No cranial nerve deficit. He exhibits normal muscle tone. Coordination normal.   Skin: Skin is warm and dry. No rash noted. He is not diaphoretic. No pallor. Nails show no clubbing.   Psychiatric: He has a normal mood and affect. His behavior is normal.     Assessment/Plan   Problems Addressed this Visit        Cardiovascular and Mediastinum    Essential hypertension   Hypertension: at goal. Evidence of target organ damage: none.  Encouraged to continue to work on diet and exercise plan.   Continue current medication  Updated labs drawn.    Relevant Orders    CBC & Differential    Comprehensive Metabolic Panel    CBC Auto Differential    Mixed hyperlipidemia  As above.   Continue current medication.    Relevant Orders    Lipid Panel       Respiratory    Chronic seasonal allergic rhinitis       Digestive    Gastro-esophageal reflux disease without esophagitis       Endocrine    Type 2 diabetes mellitus with diabetic neuropathy, without long-term current use of insulin (CMS/[facility])  Diabetes mellitus Type II, under excellent control.   Encouraged to continue to pursue ADA diet  Encouraged aerobic exercise.  Continue current medication    Relevant Orders    Hemoglobin A1c    MicroAlbumin, Urine, Random - Urine, Clean Catch        Genitourinary    Urinary frequency  Doing well at present   Continue current medication  Encouraged to report if this should change.       Other    Dips tobacco    Healthcare maintenance  Reviewed the potential benefits and risks of hepatitis A immunizations. Patient wished to proceed with this and dose # 1 administered. Patient is aware that a second dose is required in 6 months.    Relevant Orders    Hepatitis A Vaccine Adult IM (Completed)

## 2019-04-12 LAB
BASOPHILS # BLD MANUAL: 0.1 10*3/MM3 (ref 0–0.2)
BASOPHILS NFR BLD AUTO: 1.1 % (ref 0–1.5)
BURR CELLS BLD QL SMEAR: ABNORMAL
DACRYOCYTES BLD QL SMEAR: ABNORMAL
DEPRECATED RDW RBC AUTO: 47.7 FL (ref 37–54)
EOSINOPHIL # BLD MANUAL: 0.7 10*3/MM3 (ref 0–0.4)
EOSINOPHIL NFR BLD MANUAL: 7.6 % (ref 0.3–6.2)
ERYTHROCYTE [DISTWIDTH] IN BLOOD BY AUTOMATED COUNT: 14 % (ref 12.3–15.4)
HCT VFR BLD AUTO: 45 % (ref 37.5–51)
HGB BLD-MCNC: 14.9 G/DL (ref 13–17.7)
LYMPHOCYTES # BLD MANUAL: 2.91 10*3/MM3 (ref 0.7–3.1)
LYMPHOCYTES NFR BLD MANUAL: 12 % (ref 5–12)
LYMPHOCYTES NFR BLD MANUAL: 31.5 % (ref 19.6–45.3)
MCH RBC QN AUTO: 31.1 PG (ref 26.6–33)
MCHC RBC AUTO-ENTMCNC: 33.1 G/DL (ref 31.5–35.7)
MCV RBC AUTO: 93.9 FL (ref 79–97)
MONOCYTES # BLD AUTO: 1.11 10*3/MM3 (ref 0.1–0.9)
NEUTROPHILS # BLD AUTO: 4.41 10*3/MM3 (ref 1.4–7)
NEUTROPHILS NFR BLD MANUAL: 47.8 % (ref 42.7–76)
PLAT MORPH BLD: NORMAL
PLATELET # BLD AUTO: 226 10*3/MM3 (ref 140–450)
PMV BLD AUTO: 10.4 FL (ref 6–12)
POIKILOCYTOSIS BLD QL SMEAR: ABNORMAL
POLYCHROMASIA BLD QL SMEAR: ABNORMAL
RBC # BLD AUTO: 4.79 10*6/MM3 (ref 4.14–5.8)
WBC MORPH BLD: NORMAL
WBC NRBC COR # BLD: 9.23 10*3/MM3 (ref 3.4–10.8)

## 2019-05-17 DIAGNOSIS — I10 ESSENTIAL HYPERTENSION: ICD-10-CM

## 2019-05-17 RX ORDER — ASPIRIN 81 MG/1
TABLET ORAL
Qty: 30 TABLET | Refills: 5 | Status: SHIPPED | OUTPATIENT
Start: 2019-05-17 | End: 2019-11-15 | Stop reason: SDUPTHER

## 2019-06-17 DIAGNOSIS — L21.9 SEBORRHEIC DERMATITIS: ICD-10-CM

## 2019-06-17 RX ORDER — TRIAMCINOLONE ACETONIDE 1 MG/ML
LOTION TOPICAL
Qty: 120 ML | Refills: 5 | Status: SHIPPED | OUTPATIENT
Start: 2019-06-17 | End: 2020-06-11

## 2019-07-01 RX ORDER — ALOGLIPTIN AND METFORMIN HYDROCHLORIDE 12.5; 5 MG/1; MG/1
1 TABLET, FILM COATED ORAL 2 TIMES DAILY
Qty: 60 TABLET | Refills: 5 | Status: SHIPPED | OUTPATIENT
Start: 2019-07-01 | End: 2020-02-12

## 2019-07-05 RX ORDER — ATORVASTATIN CALCIUM 20 MG/1
TABLET, FILM COATED ORAL
Qty: 30 TABLET | Refills: 5 | Status: SHIPPED | OUTPATIENT
Start: 2019-07-05 | End: 2020-01-13

## 2019-07-09 DIAGNOSIS — R35.0 URINARY FREQUENCY: ICD-10-CM

## 2019-07-09 RX ORDER — MIRABEGRON 50 MG/1
TABLET, FILM COATED, EXTENDED RELEASE ORAL
Qty: 30 TABLET | Refills: 0 | Status: CANCELLED | OUTPATIENT
Start: 2019-07-09

## 2019-07-09 RX ORDER — MIRABEGRON 50 MG/1
TABLET, FILM COATED, EXTENDED RELEASE ORAL
Qty: 30 TABLET | Refills: 5 | Status: SHIPPED | OUTPATIENT
Start: 2019-07-09 | End: 2020-10-29 | Stop reason: SDUPTHER

## 2019-07-16 ENCOUNTER — OFFICE VISIT (OUTPATIENT)
Dept: FAMILY MEDICINE CLINIC | Facility: CLINIC | Age: 64
End: 2019-07-16

## 2019-07-16 VITALS
HEART RATE: 90 BPM | WEIGHT: 196 LBS | HEIGHT: 70 IN | OXYGEN SATURATION: 98 % | TEMPERATURE: 98.9 F | BODY MASS INDEX: 28.06 KG/M2

## 2019-07-16 DIAGNOSIS — R35.0 URINARY FREQUENCY: ICD-10-CM

## 2019-07-16 DIAGNOSIS — Z72.0 DIPS TOBACCO: ICD-10-CM

## 2019-07-16 DIAGNOSIS — I10 ESSENTIAL HYPERTENSION: ICD-10-CM

## 2019-07-16 DIAGNOSIS — Z00.00 HEALTHCARE MAINTENANCE: ICD-10-CM

## 2019-07-16 DIAGNOSIS — K21.9 GASTRO-ESOPHAGEAL REFLUX DISEASE WITHOUT ESOPHAGITIS: ICD-10-CM

## 2019-07-16 DIAGNOSIS — E78.2 MIXED HYPERLIPIDEMIA: Primary | ICD-10-CM

## 2019-07-16 DIAGNOSIS — E11.40 TYPE 2 DIABETES MELLITUS WITH DIABETIC NEUROPATHY, WITHOUT LONG-TERM CURRENT USE OF INSULIN (HCC): ICD-10-CM

## 2019-07-16 DIAGNOSIS — J30.2 CHRONIC SEASONAL ALLERGIC RHINITIS: ICD-10-CM

## 2019-07-16 PROCEDURE — 99214 OFFICE O/P EST MOD 30 MIN: CPT | Performed by: GENERAL PRACTICE

## 2019-07-16 NOTE — PROGRESS NOTES
Subjective   Ed Villalba is a 64 y.o. male.     History of Present Illness     Urinary Frequency  There has been no change in his frequency or nocturia since last here.  He continues to deny any difficulty voiding, urgency, dysuria, or hematuria.  Remains on myrbetriq with intermittent hesitancy the only apparent side effect    Diabetes  Current symptoms include polyuria. Patient denies paresthesia of the feet, visual disturbances, polydipsia, hypoglycemia and foot ulcerations. Evaluation to date has been: hemoglobin A1C. Home sugars: continues to check once every day or two and generally remains at goal. Current treatments - alogliptin/metformin.  He denies any apparent side effects.  Last dilated eye exam 9/4/18.    Lab Results   Component Value Date    HGBA1C 6.70 (H) 04/11/2019     Lab Results   Component Value Date    MICROALBUR 2.3 04/11/2019      Dyslipidemia  Compliance with treatment has been excellent. The patient exercises daily. He is currently being prescribed the following medication for his dyslipidemia - atorvastatin. Patient denies side effects associated with his medications.   Lab Results   Component Value Date    CHOL 76 04/11/2019    CHLPL 88 05/02/2016    TRIG 175 (H) 04/11/2019    HDL 31 (L) 04/11/2019    LDL 10 04/11/2019     Hypertension  Home blood pressure readings: are consistently at goal. Associated signs and symptoms: none. Patient denies: chest pain, palpitations, dyspnea, orthopnea, paroxysmal nocturnal dyspnea and peripheral edema. Current antihypertensive medications includes - lisinopril. Medication compliance: taking as prescribed.   Lab Results   Component Value Date    CREATININE 0.90 04/11/2019     Lab Results   Component Value Date    K 4.3 04/11/2019     Chronic Allergic Rhinitis  Patient has a history of allergic rhinitis. Patient's symptoms include sneezing, clear rhinorrhea, nasal congestion and postnasal drip. These symptoms are perennial with seasonal exacerbation. The  patient has been suffering from these symptoms for a number of years . The patient has tried loratadine with fair relief of symptoms.     The following portions of the patient's history were reviewed and updated as appropriate: allergies, current medications, past medical history, past social history and problem list.    Review of Systems   Constitutional: Positive for fatigue. Negative for appetite change, chills, fever and unexpected weight change.   HENT: Positive for rhinorrhea and sneezing. Negative for congestion, ear pain, postnasal drip, sinus pressure, sore throat and voice change.    Eyes: Negative for visual disturbance.   Respiratory: Negative for cough, shortness of breath and wheezing.    Cardiovascular: Negative for chest pain, palpitations and leg swelling.   Gastrointestinal: Negative for abdominal pain, blood in stool, constipation, diarrhea, nausea and vomiting.   Endocrine: Positive for polyuria. Negative for polydipsia.   Genitourinary: Positive for difficulty urinating (intermittent hesitancy) and frequency. Negative for dysuria, hematuria and urgency.        Nocturia × 1-4 at present   Musculoskeletal: Positive for arthralgias. Negative for back pain, joint swelling, myalgias and neck pain.   Skin: Negative for color change.   Neurological: Negative for tremors, weakness, numbness and headaches.   Psychiatric/Behavioral: Negative for dysphoric mood, sleep disturbance and suicidal ideas. The patient is not nervous/anxious.      Objective   Physical Exam   Constitutional: He is oriented to person, place, and time. He appears well-developed and well-nourished. He is cooperative. He does not have a sickly appearance. No distress.   Bright, in good spirits.  No apparent distress.  No pallor, cyanosis, diaphoresis, or jaundice   HENT:   Head: Atraumatic.   Right Ear: Tympanic membrane, external ear and ear canal normal.   Left Ear: Tympanic membrane, external ear and ear canal normal.    Mouth/Throat: Oropharynx is clear and moist. Dental caries present.   Eyes: EOM are normal. Pupils are equal, round, and reactive to light. No scleral icterus.   Neck: No JVD present. Carotid bruit is not present. No tracheal deviation present. No thyromegaly present.   Cardiovascular: Normal rate, regular rhythm, S1 normal, S2 normal, normal heart sounds and intact distal pulses. Exam reveals no gallop.   No murmur heard.  Pulmonary/Chest: Breath sounds normal. He has no wheezes. He has no rales.   Abdominal: Soft. Normal aorta and bowel sounds are normal. He exhibits no abdominal bruit and no mass. There is no hepatosplenomegaly. There is no tenderness. No hernia.   Musculoskeletal: He exhibits no tenderness or deformity.   Lymphadenopathy:        Head (right side): No submandibular adenopathy present.        Head (left side): No submandibular adenopathy present.     He has no cervical adenopathy.   Neurological: He is alert and oriented to person, place, and time. He has normal strength and normal reflexes. He displays normal reflexes. A sensory deficit (decreased vibration sense both feet) is present. No cranial nerve deficit. He exhibits normal muscle tone. Coordination normal.   Skin: Skin is warm and dry. No rash noted. He is not diaphoretic. No pallor. Nails show no clubbing.   Psychiatric: He has a normal mood and affect. His behavior is normal.     Assessment/Plan   Problems Addressed this Visit        Cardiovascular and Mediastinum    Essential hypertension   Hypertension: at goal. Evidence of target organ damage: none.  Encouraged to continue to work on diet and exercise plan.   Continue current medication    Mixed hyperlipidemia   As above.   Continue current medication.       Respiratory    Chronic seasonal allergic rhinitis  Reminded regarding allergen avoidance.  Continue current medication       Digestive    Gastro-esophageal reflux disease without esophagitis       Endocrine    Type 2 diabetes  mellitus with diabetic neuropathy, without long-term current use of insulin (CMS/[facility])  Diabetes mellitus Type II, under excellent control.   Encouraged to continue to pursue ADA diet  Encouraged aerobic exercise.  Continue current medication  Updated labs will be drawn at his return.       Genitourinary    Urinary frequency  Patient remains uninterested in a urology reassessment  Continue current medication  Encouraged to report if any worse or if any new symptoms or concerns.       Other    Dips tobacco    Healthcare maintenance  Hepatitis a #2 will be administered at return along with a flu shot.

## 2019-07-17 DIAGNOSIS — I10 ESSENTIAL HYPERTENSION: ICD-10-CM

## 2019-07-17 DIAGNOSIS — K21.9 GASTRO-ESOPHAGEAL REFLUX DISEASE WITHOUT ESOPHAGITIS: ICD-10-CM

## 2019-07-17 DIAGNOSIS — E11.40 TYPE 2 DIABETES MELLITUS WITH DIABETIC NEUROPATHY (HCC): ICD-10-CM

## 2019-07-17 RX ORDER — PANTOPRAZOLE SODIUM 40 MG/1
TABLET, DELAYED RELEASE ORAL
Qty: 30 TABLET | Refills: 5 | Status: SHIPPED | OUTPATIENT
Start: 2019-07-17 | End: 2020-02-12

## 2019-07-17 RX ORDER — LISINOPRIL 10 MG/1
TABLET ORAL
Qty: 30 TABLET | Refills: 5 | Status: SHIPPED | OUTPATIENT
Start: 2019-07-17 | End: 2019-08-26 | Stop reason: SDUPTHER

## 2019-08-26 DIAGNOSIS — I10 ESSENTIAL HYPERTENSION: ICD-10-CM

## 2019-08-26 RX ORDER — LISINOPRIL 10 MG/1
TABLET ORAL
Qty: 30 TABLET | Refills: 0 | Status: SHIPPED | OUTPATIENT
Start: 2019-08-26 | End: 2019-09-30 | Stop reason: SDUPTHER

## 2019-09-04 DIAGNOSIS — Z00.00 HEALTHCARE MAINTENANCE: ICD-10-CM

## 2019-09-04 RX ORDER — MELATONIN
Qty: 30 TABLET | Refills: 0 | Status: SHIPPED | OUTPATIENT
Start: 2019-09-04 | End: 2019-10-07 | Stop reason: SDUPTHER

## 2019-09-30 DIAGNOSIS — I10 ESSENTIAL HYPERTENSION: ICD-10-CM

## 2019-09-30 RX ORDER — LISINOPRIL 10 MG/1
TABLET ORAL
Qty: 30 TABLET | Refills: 5 | Status: SHIPPED | OUTPATIENT
Start: 2019-09-30 | End: 2020-04-17 | Stop reason: SDUPTHER

## 2019-10-07 DIAGNOSIS — Z00.00 HEALTHCARE MAINTENANCE: ICD-10-CM

## 2019-10-07 DIAGNOSIS — J30.2 CHRONIC SEASONAL ALLERGIC RHINITIS: ICD-10-CM

## 2019-10-07 RX ORDER — MELATONIN
Qty: 30 TABLET | Refills: 5 | Status: SHIPPED | OUTPATIENT
Start: 2019-10-07 | End: 2020-04-13

## 2019-10-07 RX ORDER — LORATADINE 10 MG/1
TABLET ORAL
Qty: 30 TABLET | Refills: 5 | Status: SHIPPED | OUTPATIENT
Start: 2019-10-07 | End: 2019-11-15 | Stop reason: SDUPTHER

## 2019-11-15 DIAGNOSIS — I10 ESSENTIAL HYPERTENSION: ICD-10-CM

## 2019-11-15 DIAGNOSIS — J30.2 CHRONIC SEASONAL ALLERGIC RHINITIS: ICD-10-CM

## 2019-11-15 RX ORDER — LORATADINE 10 MG/1
10 TABLET ORAL DAILY
Qty: 30 TABLET | Refills: 5 | Status: SHIPPED | OUTPATIENT
Start: 2019-11-15 | End: 2021-02-23 | Stop reason: SDUPTHER

## 2019-11-15 RX ORDER — ASPIRIN 81 MG/1
81 TABLET ORAL DAILY
Qty: 30 TABLET | Refills: 5 | Status: SHIPPED | OUTPATIENT
Start: 2019-11-15 | End: 2020-04-23 | Stop reason: SDUPTHER

## 2020-01-13 RX ORDER — ATORVASTATIN CALCIUM 20 MG/1
TABLET, FILM COATED ORAL
Qty: 30 TABLET | Refills: 5 | Status: SHIPPED | OUTPATIENT
Start: 2020-01-13 | End: 2020-07-09

## 2020-01-14 ENCOUNTER — OFFICE VISIT (OUTPATIENT)
Dept: FAMILY MEDICINE CLINIC | Facility: CLINIC | Age: 65
End: 2020-01-14

## 2020-01-14 DIAGNOSIS — J30.2 CHRONIC SEASONAL ALLERGIC RHINITIS: ICD-10-CM

## 2020-01-14 DIAGNOSIS — Z72.0 DIPS TOBACCO: ICD-10-CM

## 2020-01-14 DIAGNOSIS — K21.9 GASTRO-ESOPHAGEAL REFLUX DISEASE WITHOUT ESOPHAGITIS: ICD-10-CM

## 2020-01-14 DIAGNOSIS — E11.40 TYPE 2 DIABETES MELLITUS WITH DIABETIC NEUROPATHY, WITHOUT LONG-TERM CURRENT USE OF INSULIN (HCC): ICD-10-CM

## 2020-01-14 DIAGNOSIS — I10 ESSENTIAL HYPERTENSION: ICD-10-CM

## 2020-01-14 DIAGNOSIS — Z00.00 HEALTHCARE MAINTENANCE: ICD-10-CM

## 2020-01-14 DIAGNOSIS — R35.0 URINARY FREQUENCY: ICD-10-CM

## 2020-01-14 DIAGNOSIS — E78.2 MIXED HYPERLIPIDEMIA: Primary | ICD-10-CM

## 2020-01-14 PROCEDURE — 99214 OFFICE O/P EST MOD 30 MIN: CPT | Performed by: GENERAL PRACTICE

## 2020-01-14 PROCEDURE — 83036 HEMOGLOBIN GLYCOSYLATED A1C: CPT | Performed by: GENERAL PRACTICE

## 2020-01-14 PROCEDURE — 80053 COMPREHEN METABOLIC PANEL: CPT | Performed by: GENERAL PRACTICE

## 2020-01-14 PROCEDURE — 85025 COMPLETE CBC W/AUTO DIFF WBC: CPT | Performed by: GENERAL PRACTICE

## 2020-01-14 PROCEDURE — 84443 ASSAY THYROID STIM HORMONE: CPT | Performed by: GENERAL PRACTICE

## 2020-01-14 PROCEDURE — 82043 UR ALBUMIN QUANTITATIVE: CPT | Performed by: GENERAL PRACTICE

## 2020-01-14 PROCEDURE — 80061 LIPID PANEL: CPT | Performed by: GENERAL PRACTICE

## 2020-01-14 RX ORDER — MELOXICAM 15 MG/1
15 TABLET ORAL DAILY
Qty: 30 TABLET | Refills: 5 | Status: SHIPPED | OUTPATIENT
Start: 2020-01-14 | End: 2020-07-09

## 2020-01-14 NOTE — PROGRESS NOTES
Subjective   Ed Villalba is a 64 y.o. male.     History of Present Illness     Urinary Frequency  There has been no change in his frequency or nocturia since last here.  His symptoms remain quite labile.  He states that he can have anywhere from 2-12 episodes of nocturia.  He continues to deny any difficulty voiding, urgency, dysuria, or hematuria.  He stopped Myrbetriq sometime ago and really has felt no different    Diabetes  Current symptoms include polyuria. Patient denies paresthesia of the feet, visual disturbances, polydipsia, hypoglycemia and foot ulcerations. Evaluation to date has been: hemoglobin A1C. Home sugars: continues to check once every day or two and generally remains at goal. Current treatments - alogliptin/metformin.  He denies any apparent side effects.  Last dilated eye exam 9/4/18.       Dyslipidemia  Compliance with treatment remains excellent. The patient exercises daily. He is currently being prescribed the following medication for his dyslipidemia - atorvastatin. Patient denies side effects associated with his medications.     Hypertension  Home blood pressure readings: are consistently at goal. Associated signs and symptoms: none. Patient denies: chest pain, palpitations, dyspnea, orthopnea, paroxysmal nocturnal dyspnea and peripheral edema. Current antihypertensive medications includes - lisinopril. Medication compliance: taking as prescribed.     Chronic Allergic Rhinitis  Patient has a history of allergic rhinitis. Patient's symptoms include sneezing, clear rhinorrhea, nasal congestion and postnasal drip. These symptoms are perennial with seasonal exacerbation. The patient has been suffering from these symptoms for a number of years . The patient remains on loratadine with fair relief of symptoms.     The following portions of the patient's history were reviewed and updated as appropriate: allergies, current medications, past medical history, past social history and problem  list.    Review of Systems   Constitutional: Positive for fatigue. Negative for appetite change, chills, fever and unexpected weight change.   HENT: Positive for rhinorrhea and sneezing. Negative for congestion, ear pain, postnasal drip, sinus pressure, sore throat and voice change.    Eyes: Negative for visual disturbance.   Respiratory: Negative for cough, shortness of breath and wheezing.    Cardiovascular: Negative for chest pain, palpitations and leg swelling.   Gastrointestinal: Negative for abdominal pain, blood in stool, constipation, diarrhea, nausea and vomiting.   Endocrine: Positive for polyuria. Negative for polydipsia.   Genitourinary: Positive for difficulty urinating (intermittent hesitancy) and frequency. Negative for dysuria, hematuria and urgency.        Nocturia × 1-4 at present   Musculoskeletal: Positive for arthralgias. Negative for back pain, joint swelling, myalgias and neck pain.   Skin: Negative for color change.   Neurological: Negative for weakness, numbness and headaches.   Psychiatric/Behavioral: Positive for sleep disturbance (due to nocturia). Negative for dysphoric mood. The patient is not nervous/anxious.      Objective   Physical Exam   Constitutional: He is oriented to person, place, and time. He appears well-developed and well-nourished. He is cooperative. He does not have a sickly appearance. No distress.   Accompanied by his wife.  Bright, in good spirits.  No apparent distress.  No pallor, cyanosis, diaphoresis, or jaundice   HENT:   Head: Atraumatic.   Right Ear: Tympanic membrane, external ear and ear canal normal.   Left Ear: Tympanic membrane, external ear and ear canal normal.   Mouth/Throat: Oropharynx is clear and moist. Dental caries present.   Eyes: Pupils are equal, round, and reactive to light. EOM are normal. No scleral icterus.   Neck: No JVD present. Carotid bruit is not present. No tracheal deviation present. No thyromegaly present.   Cardiovascular: Normal  rate, regular rhythm, S1 normal, S2 normal, normal heart sounds and intact distal pulses. Exam reveals no gallop.   No murmur heard.  Pulmonary/Chest: Breath sounds normal. He has no wheezes. He has no rales.   Musculoskeletal: He exhibits no tenderness or deformity.   Lymphadenopathy:        Head (right side): No submandibular adenopathy present.        Head (left side): No submandibular adenopathy present.     He has no cervical adenopathy.   Neurological: He is alert and oriented to person, place, and time. A sensory deficit (decreased vibration sense both feet) is present. No cranial nerve deficit. He exhibits normal muscle tone. Coordination normal.   Skin: Skin is warm and dry. No rash noted. He is not diaphoretic. No pallor. Nails show no clubbing.   Psychiatric: He has a normal mood and affect. His behavior is normal.     Assessment/Plan   Problems Addressed this Visit        Cardiovascular and Mediastinum    Essential hypertension   Hypertension: at goal. Evidence of target organ damage: none.  Encouraged to continue to work on diet and exercise plan.   Continue current medication  Updated labs drawn    Relevant Orders    CBC & Differential (Completed)    Comprehensive Metabolic Panel (Completed)    CBC Auto Differential (Completed)    Mixed hyperlipidemia  As above.   Continue current medication.    Relevant Orders    Lipid Panel (Completed)    TSH (Completed)       Respiratory    Chronic seasonal allergic rhinitis    Reminded regarding allergen avoidance.  Continue current medication           Digestive    Gastro-esophageal reflux disease without esophagitis       Endocrine    Type 2 diabetes mellitus with diabetic neuropathy, without long-term current use of insulin (CMS/[facility])  Diabetes mellitus Type II, under unknown control.   Encouraged to continue to pursue ADA diet  Encouraged aerobic exercise.  Continue current medication  Reminded to get yearly retinal exam.    Relevant Orders    Hemoglobin A1c  (Completed)    MicroAlbumin, Urine, Random - Urine, Clean Catch (Completed)       Genitourinary    Urinary frequency  Chronic.  Stable.  Etiology remains unclear  Patient remains uninterested in a urology reassessment       Other    Dips tobacco    Healthcare maintenance  Recommended a flu shot wherever available   Prescriptions written for hepatitis A #2 along with an updated Tdap

## 2020-01-15 VITALS
WEIGHT: 198 LBS | HEIGHT: 70 IN | BODY MASS INDEX: 28.35 KG/M2 | RESPIRATION RATE: 12 BRPM | TEMPERATURE: 98.6 F | DIASTOLIC BLOOD PRESSURE: 70 MMHG | SYSTOLIC BLOOD PRESSURE: 125 MMHG | OXYGEN SATURATION: 95 % | HEART RATE: 70 BPM

## 2020-01-15 LAB
ALBUMIN SERPL-MCNC: 4.1 G/DL (ref 3.5–5.2)
ALBUMIN UR-MCNC: <1.2 MG/DL
ALBUMIN/GLOB SERPL: 1.2 G/DL
ALP SERPL-CCNC: 55 U/L (ref 39–117)
ALT SERPL W P-5'-P-CCNC: 21 U/L (ref 1–41)
ANION GAP SERPL CALCULATED.3IONS-SCNC: 14 MMOL/L (ref 5–15)
AST SERPL-CCNC: 26 U/L (ref 1–40)
BASOPHILS # BLD AUTO: 0.06 10*3/MM3 (ref 0–0.2)
BASOPHILS NFR BLD AUTO: 0.7 % (ref 0–1.5)
BILIRUB SERPL-MCNC: 0.3 MG/DL (ref 0.2–1.2)
BUN BLD-MCNC: 8 MG/DL (ref 8–23)
BUN/CREAT SERPL: 9.6 (ref 7–25)
CALCIUM SPEC-SCNC: 9.6 MG/DL (ref 8.6–10.5)
CHLORIDE SERPL-SCNC: 101 MMOL/L (ref 98–107)
CHOLEST SERPL-MCNC: 91 MG/DL (ref 0–200)
CO2 SERPL-SCNC: 22 MMOL/L (ref 22–29)
CREAT BLD-MCNC: 0.83 MG/DL (ref 0.76–1.27)
DEPRECATED RDW RBC AUTO: 42.8 FL (ref 37–54)
EOSINOPHIL # BLD AUTO: 1.13 10*3/MM3 (ref 0–0.4)
EOSINOPHIL NFR BLD AUTO: 13.5 % (ref 0.3–6.2)
ERYTHROCYTE [DISTWIDTH] IN BLOOD BY AUTOMATED COUNT: 13 % (ref 12.3–15.4)
GFR SERPL CREATININE-BSD FRML MDRD: 93 ML/MIN/1.73
GLOBULIN UR ELPH-MCNC: 3.5 GM/DL
GLUCOSE BLD-MCNC: 148 MG/DL (ref 65–99)
HBA1C MFR BLD: 7.69 % (ref 4.8–5.6)
HCT VFR BLD AUTO: 45.7 % (ref 37.5–51)
HDLC SERPL-MCNC: 35 MG/DL (ref 40–60)
HGB BLD-MCNC: 15.7 G/DL (ref 13–17.7)
IMM GRANULOCYTES # BLD AUTO: 0.02 10*3/MM3 (ref 0–0.05)
IMM GRANULOCYTES NFR BLD AUTO: 0.2 % (ref 0–0.5)
LDLC SERPL CALC-MCNC: 22 MG/DL (ref 0–100)
LDLC/HDLC SERPL: 0.63 {RATIO}
LYMPHOCYTES # BLD AUTO: 2.35 10*3/MM3 (ref 0.7–3.1)
LYMPHOCYTES NFR BLD AUTO: 28.2 % (ref 19.6–45.3)
MCH RBC QN AUTO: 31.1 PG (ref 26.6–33)
MCHC RBC AUTO-ENTMCNC: 34.4 G/DL (ref 31.5–35.7)
MCV RBC AUTO: 90.5 FL (ref 79–97)
MONOCYTES # BLD AUTO: 0.58 10*3/MM3 (ref 0.1–0.9)
MONOCYTES NFR BLD AUTO: 7 % (ref 5–12)
NEUTROPHILS # BLD AUTO: 4.2 10*3/MM3 (ref 1.7–7)
NEUTROPHILS NFR BLD AUTO: 50.4 % (ref 42.7–76)
NRBC BLD AUTO-RTO: 0 /100 WBC (ref 0–0.2)
PLATELET # BLD AUTO: 195 10*3/MM3 (ref 140–450)
PMV BLD AUTO: 10.3 FL (ref 6–12)
POTASSIUM BLD-SCNC: 4 MMOL/L (ref 3.5–5.2)
PROT SERPL-MCNC: 7.6 G/DL (ref 6–8.5)
RBC # BLD AUTO: 5.05 10*6/MM3 (ref 4.14–5.8)
SODIUM BLD-SCNC: 137 MMOL/L (ref 136–145)
TRIGL SERPL-MCNC: 170 MG/DL (ref 0–150)
TSH SERPL DL<=0.05 MIU/L-ACNC: 1.8 UIU/ML (ref 0.27–4.2)
VLDLC SERPL-MCNC: 34 MG/DL (ref 5–40)
WBC NRBC COR # BLD: 8.34 10*3/MM3 (ref 3.4–10.8)

## 2020-01-20 NOTE — PROGRESS NOTES
Sent letter.     -- Please let patient know that his sugar is running higher -is he taking 2 of his sugar pills daily at present? If not he needs to

## 2020-02-12 DIAGNOSIS — K21.9 GASTRO-ESOPHAGEAL REFLUX DISEASE WITHOUT ESOPHAGITIS: ICD-10-CM

## 2020-02-12 RX ORDER — ALOGLIPTIN AND METFORMIN HYDROCHLORIDE 12.5; 5 MG/1; MG/1
TABLET, FILM COATED ORAL
Qty: 60 TABLET | Refills: 5 | Status: SHIPPED | OUTPATIENT
Start: 2020-02-12 | End: 2020-03-26

## 2020-02-12 RX ORDER — PANTOPRAZOLE SODIUM 40 MG/1
TABLET, DELAYED RELEASE ORAL
Qty: 30 TABLET | Refills: 5 | Status: SHIPPED | OUTPATIENT
Start: 2020-02-12 | End: 2020-08-17

## 2020-02-13 DIAGNOSIS — E11.40 TYPE 2 DIABETES MELLITUS WITH DIABETIC NEUROPATHY (HCC): ICD-10-CM

## 2020-03-23 DIAGNOSIS — E11.40 TYPE 2 DIABETES MELLITUS WITH DIABETIC NEUROPATHY (HCC): ICD-10-CM

## 2020-03-23 RX ORDER — LANCETS 30 GAUGE
EACH MISCELLANEOUS
Qty: 100 EACH | Refills: 3 | Status: SHIPPED | OUTPATIENT
Start: 2020-03-23 | End: 2020-12-23

## 2020-03-26 RX ORDER — METFORMIN HYDROCHLORIDE 500 MG/1
500 TABLET, EXTENDED RELEASE ORAL DAILY
Qty: 30 TABLET | Refills: 5 | Status: SHIPPED | OUTPATIENT
Start: 2020-03-26 | End: 2020-09-18

## 2020-03-27 ENCOUNTER — TELEPHONE (OUTPATIENT)
Dept: FAMILY MEDICINE CLINIC | Facility: CLINIC | Age: 65
End: 2020-03-27

## 2020-03-27 NOTE — TELEPHONE ENCOUNTER
----- Message from Dwight Borges MD sent at 3/26/2020  5:00 PM EDT -----  k - robb sent the januvia and metformin separately    ----- Message -----  From: Hafsa Abbasi MA  Sent: 3/26/2020   4:51 PM EDT  To: Dwight Borges MD    Its not covered.   ----- Message -----  From: Dwight Borges MD  Sent: 3/26/2020   4:45 PM EDT  To: Hafsa Abbasi MA    What about janumet    ----- Message -----  From: Hafsa Abbasi MA  Sent: 3/26/2020   3:23 PM EDT  To: Dwight Borges MD    Januvia.   ----- Message -----  From: Dwight Borges MD  Sent: 3/24/2020   4:34 PM EDT  To: Hafsa Abbasi MA    Please check what his insurance will cover - the only reason he is on this one is they stopped covering janumet    ----- Message -----  From: Hafsa Abbasi MA  Sent: 3/24/2020   4:08 PM EDT  To: Dwight Borges MD    Patient's pharmacy can no longer get the patient's Alogliptin-metFORMIN. Can you change it something else?

## 2020-04-13 DIAGNOSIS — Z00.00 HEALTHCARE MAINTENANCE: ICD-10-CM

## 2020-04-13 RX ORDER — MELATONIN
Qty: 30 TABLET | Refills: 5 | Status: SHIPPED | OUTPATIENT
Start: 2020-04-13 | End: 2020-10-29 | Stop reason: SDUPTHER

## 2020-04-17 DIAGNOSIS — I10 ESSENTIAL HYPERTENSION: ICD-10-CM

## 2020-04-17 RX ORDER — LISINOPRIL 10 MG/1
10 TABLET ORAL DAILY
Qty: 30 TABLET | Refills: 5 | Status: SHIPPED | OUTPATIENT
Start: 2020-04-17 | End: 2020-11-23

## 2020-04-23 ENCOUNTER — OFFICE VISIT (OUTPATIENT)
Dept: FAMILY MEDICINE CLINIC | Facility: CLINIC | Age: 65
End: 2020-04-23

## 2020-04-23 DIAGNOSIS — K21.9 GASTRO-ESOPHAGEAL REFLUX DISEASE WITHOUT ESOPHAGITIS: ICD-10-CM

## 2020-04-23 DIAGNOSIS — E78.2 MIXED HYPERLIPIDEMIA: Primary | ICD-10-CM

## 2020-04-23 DIAGNOSIS — Z72.0 DIPS TOBACCO: ICD-10-CM

## 2020-04-23 DIAGNOSIS — E11.40 TYPE 2 DIABETES MELLITUS WITH DIABETIC NEUROPATHY, WITHOUT LONG-TERM CURRENT USE OF INSULIN (HCC): ICD-10-CM

## 2020-04-23 DIAGNOSIS — I10 ESSENTIAL HYPERTENSION: ICD-10-CM

## 2020-04-23 DIAGNOSIS — J30.2 CHRONIC SEASONAL ALLERGIC RHINITIS: ICD-10-CM

## 2020-04-23 DIAGNOSIS — Z00.00 HEALTHCARE MAINTENANCE: ICD-10-CM

## 2020-04-23 DIAGNOSIS — R35.0 URINARY FREQUENCY: ICD-10-CM

## 2020-04-23 PROCEDURE — 99212 OFFICE O/P EST SF 10 MIN: CPT | Performed by: GENERAL PRACTICE

## 2020-04-23 RX ORDER — ASPIRIN 81 MG/1
81 TABLET ORAL DAILY
Qty: 30 TABLET | Refills: 5 | Status: SHIPPED | OUTPATIENT
Start: 2020-04-23 | End: 2020-07-09

## 2020-04-23 NOTE — PROGRESS NOTES
Subjective   Ed Villalba is a 64 y.o. male.     History of Present Illness     This visit has been rescheduled as a phone visit to comply with patient safety concerns in accordance with CDC recommendations. Total time of discussion was 12 minutes.    You have chosen to receive care through a telephone visit. Do you consent to use a telephone visit for your medical care today? Yes    Urinary Frequency  There has been no change in his frequency or nocturia since last here.  His symptoms remain quite labile.  He states that he can have anywhere from 2-12 episodes of nocturia.  He continues to deny any difficulty voiding, urgency, dysuria, or hematuria.  He has been tried on anticholinergic medications as well as myrbetriq with no consistent benefit.  He was previously followed by urology    Diabetes  Current symptoms include polyuria. Patient denies paresthesia of the feet, visual disturbances, polydipsia, hypoglycemia and foot ulcerations. Evaluation to date has been: hemoglobin A1C. Home sugars: continues to check once every day or two and generally remains at goal. Current treatments - metformin and sitagliptin.  He appears to be taking these as prescribed and denies any apparent side effects.     Lab Results   Component Value Date    HGBA1C 7.69 (H) 01/14/2020     Lab Results   Component Value Date    MICROALBUR <1.2 01/14/2020      Dyslipidemia  Compliance with treatment remains excellent. The patient exercises daily. He is currently being prescribed the following medication for his dyslipidemia - atorvastatin. Patient denies side effects associated with his medications.   Lab Results   Component Value Date    CHOL 91 01/14/2020    CHLPL 88 05/02/2016    TRIG 170 (H) 01/14/2020    HDL 35 (L) 01/14/2020    LDL 22 01/14/2020     Hypertension  Home blood pressure readings: are consistently at goal. Associated signs and symptoms: none. Patient denies: chest pain, palpitations, dyspnea, orthopnea, paroxysmal nocturnal  dyspnea and peripheral edema. Current antihypertensive medications includes - lisinopril. Medication compliance: taking as prescribed.   Lab Results   Component Value Date    GLUCOSE 148 (H) 01/14/2020    BUN 8 01/14/2020    CREATININE 0.83 01/14/2020    EGFRIFNONA 93 01/14/2020    BCR 9.6 01/14/2020    K 4.0 01/14/2020    CO2 22.0 01/14/2020    CALCIUM 9.6 01/14/2020    ALBUMIN 4.10 01/14/2020    LABIL2 1.4 (L) 05/02/2016    AST 26 01/14/2020    ALT 21 01/14/2020     The following portions of the patient's history were reviewed and updated as appropriate: allergies, current medications, past medical history, past social history and problem list.    Review of Systems   Constitutional: Positive for fatigue. Negative for appetite change, chills, fever and unexpected weight change.   HENT: Positive for rhinorrhea and sneezing. Negative for congestion, ear pain, postnasal drip, sinus pressure, sore throat and voice change.    Eyes: Negative for visual disturbance.   Respiratory: Negative for cough, shortness of breath and wheezing.    Cardiovascular: Negative for chest pain, palpitations and leg swelling.   Gastrointestinal: Negative for abdominal pain, blood in stool, constipation, diarrhea, nausea and vomiting.   Endocrine: Positive for polyuria. Negative for polydipsia.   Genitourinary: Positive for difficulty urinating (intermittent hesitancy) and frequency. Negative for dysuria, hematuria and urgency.        Nocturia × 1-4 at present   Musculoskeletal: Positive for arthralgias. Negative for back pain and myalgias.   Skin: Negative for rash.   Neurological: Negative for weakness, numbness and headaches.   Psychiatric/Behavioral: Positive for sleep disturbance (due to nocturia). Negative for dysphoric mood. The patient is not nervous/anxious.      Objective   Physical Exam   Constitutional:   Alert and oriented.  Bright and in good spirits.  No apparent distress     Assessment/Plan   Problems Addressed this Visit         Cardiovascular and Mediastinum    Essential hypertension  Encouraged to continue to work on his diet and exercise plan.  Continue current medication    Relevant Medications    aspirin 81 MG EC tablet    Mixed hyperlipidemia  As above.   Continue current medication.       Respiratory    Chronic seasonal allergic rhinitis       Digestive    Gastro-esophageal reflux disease without esophagitis       Endocrine    Type 2 diabetes mellitus with diabetic neuropathy, without long-term current use of insulin (CMS/[facility])  Diabetes mellitus Type II, under good control.   Encouraged to continue to pursue ADA diet  Encouraged aerobic exercise.  Continue current medication  Updated labs will be drawn at his return.       Genitourinary    Urinary frequency  Patient remains uninterested in any further work-up or any referral back to urology for now  Encouraged to report if any worse or if any new symptoms or concerns.       Other    Dips tobacco    Healthcare maintenance  Prescriptions written for hepatitis A #2 along with Shingrix  We will discuss an updated Tdap at his return    Relevant Medications    Zoster Vac Recomb Adjuvanted (Shingrix) 50 MCG/0.5ML reconstituted suspension    hepatitis A (HAVRIX) 1440 EL U/ML vaccine

## 2020-06-11 DIAGNOSIS — L21.9 SEBORRHEIC DERMATITIS: ICD-10-CM

## 2020-06-11 RX ORDER — TRIAMCINOLONE ACETONIDE 1 MG/ML
LOTION TOPICAL
Qty: 120 ML | Refills: 5 | Status: SHIPPED | OUTPATIENT
Start: 2020-06-11 | End: 2022-01-04 | Stop reason: SDUPTHER

## 2020-07-09 DIAGNOSIS — I10 ESSENTIAL HYPERTENSION: ICD-10-CM

## 2020-07-09 RX ORDER — ASPIRIN 81 MG/1
TABLET, COATED ORAL
Qty: 30 TABLET | Refills: 5 | Status: SHIPPED | OUTPATIENT
Start: 2020-07-09 | End: 2021-02-23 | Stop reason: SDUPTHER

## 2020-07-09 RX ORDER — MELOXICAM 15 MG/1
15 TABLET ORAL DAILY
Qty: 30 TABLET | Refills: 5 | Status: SHIPPED | OUTPATIENT
Start: 2020-07-09 | End: 2021-01-18

## 2020-07-09 RX ORDER — ATORVASTATIN CALCIUM 20 MG/1
TABLET, FILM COATED ORAL
Qty: 30 TABLET | Refills: 5 | Status: SHIPPED | OUTPATIENT
Start: 2020-07-09 | End: 2021-01-18

## 2020-07-23 ENCOUNTER — OFFICE VISIT (OUTPATIENT)
Dept: FAMILY MEDICINE CLINIC | Facility: CLINIC | Age: 65
End: 2020-07-23

## 2020-07-23 VITALS
HEART RATE: 78 BPM | SYSTOLIC BLOOD PRESSURE: 122 MMHG | HEIGHT: 70 IN | OXYGEN SATURATION: 95 % | WEIGHT: 189 LBS | DIASTOLIC BLOOD PRESSURE: 65 MMHG | RESPIRATION RATE: 12 BRPM | TEMPERATURE: 97.3 F | BODY MASS INDEX: 27.06 KG/M2

## 2020-07-23 DIAGNOSIS — E11.40 TYPE 2 DIABETES MELLITUS WITH DIABETIC NEUROPATHY, WITHOUT LONG-TERM CURRENT USE OF INSULIN (HCC): ICD-10-CM

## 2020-07-23 DIAGNOSIS — Z72.0 DIPS TOBACCO: ICD-10-CM

## 2020-07-23 DIAGNOSIS — Z00.00 HEALTHCARE MAINTENANCE: ICD-10-CM

## 2020-07-23 DIAGNOSIS — K21.9 GASTRO-ESOPHAGEAL REFLUX DISEASE WITHOUT ESOPHAGITIS: ICD-10-CM

## 2020-07-23 DIAGNOSIS — E78.2 MIXED HYPERLIPIDEMIA: Primary | ICD-10-CM

## 2020-07-23 DIAGNOSIS — M81.0 AGE-RELATED OSTEOPOROSIS WITHOUT CURRENT PATHOLOGICAL FRACTURE: ICD-10-CM

## 2020-07-23 DIAGNOSIS — J30.2 CHRONIC SEASONAL ALLERGIC RHINITIS: ICD-10-CM

## 2020-07-23 DIAGNOSIS — I10 ESSENTIAL HYPERTENSION: ICD-10-CM

## 2020-07-23 DIAGNOSIS — R35.0 URINARY FREQUENCY: ICD-10-CM

## 2020-07-23 DIAGNOSIS — T14.8XXA ABRASION: ICD-10-CM

## 2020-07-23 PROCEDURE — 80061 LIPID PANEL: CPT | Performed by: GENERAL PRACTICE

## 2020-07-23 PROCEDURE — 90715 TDAP VACCINE 7 YRS/> IM: CPT | Performed by: GENERAL PRACTICE

## 2020-07-23 PROCEDURE — 85025 COMPLETE CBC W/AUTO DIFF WBC: CPT | Performed by: GENERAL PRACTICE

## 2020-07-23 PROCEDURE — 80053 COMPREHEN METABOLIC PANEL: CPT | Performed by: GENERAL PRACTICE

## 2020-07-23 PROCEDURE — 90471 IMMUNIZATION ADMIN: CPT | Performed by: GENERAL PRACTICE

## 2020-07-23 PROCEDURE — 83036 HEMOGLOBIN GLYCOSYLATED A1C: CPT | Performed by: GENERAL PRACTICE

## 2020-07-23 PROCEDURE — 84443 ASSAY THYROID STIM HORMONE: CPT | Performed by: GENERAL PRACTICE

## 2020-07-23 PROCEDURE — 36415 COLL VENOUS BLD VENIPUNCTURE: CPT | Performed by: GENERAL PRACTICE

## 2020-07-23 PROCEDURE — G0402 INITIAL PREVENTIVE EXAM: HCPCS | Performed by: GENERAL PRACTICE

## 2020-07-23 PROCEDURE — 82306 VITAMIN D 25 HYDROXY: CPT | Performed by: GENERAL PRACTICE

## 2020-07-23 NOTE — PATIENT INSTRUCTIONS
Advance Directive    Advance directives are legal documents that let you make choices ahead of time about your health care and medical treatment in case you become unable to communicate for yourself. Advance directives are a way for you to communicate your wishes to family, friends, and health care providers. This can help convey your decisions about end-of-life care if you become unable to communicate.  Discussing and writing advance directives should happen over time rather than all at once. Advance directives can be changed depending on your situation and what you want, even after you have signed the advance directives.  If you do not have an advance directive, some states assign family decision makers to act on your behalf based on how closely you are related to them. Each state has its own laws regarding advance directives. You may want to check with your health care provider, , or state representative about the laws in your state. There are different types of advance directives, such as:  · Medical power of .  · Living will.  · Do not resuscitate (DNR) or do not attempt resuscitation (DNAR) order.  Health care proxy and medical power of   A health care proxy, also called a health care agent, is a person who is appointed to make medical decisions for you in cases in which you are unable to make the decisions yourself. Generally, people choose someone they know well and trust to represent their preferences. Make sure to ask this person for an agreement to act as your proxy. A proxy may have to exercise judgment in the event of a medical decision for which your wishes are not known.  A medical power of  is a legal document that names your health care proxy. Depending on the laws in your state, after the document is written, it may also need to be:  · Signed.  · Notarized.  · Dated.  · Copied.  · Witnessed.  · Incorporated into your medical record.  You may also want to appoint  someone to manage your financial affairs in a situation in which you are unable to do so. This is called a durable power of  for finances. It is a separate legal document from the durable power of  for health care. You may choose the same person or someone different from your health care proxy to act as your agent in financial matters.  If you do not appoint a proxy, or if there is a concern that the proxy is not acting in your best interests, a court-appointed guardian may be designated to act on your behalf.  Living will  A living will is a set of instructions documenting your wishes about medical care when you cannot express them yourself. Health care providers should keep a copy of your living will in your medical record. You may want to give a copy to family members or friends. To alert caregivers in case of an emergency, you can place a card in your wallet to let them know that you have a living will and where they can find it. A living will is used if you become:  · Terminally ill.  · Incapacitated.  · Unable to communicate or make decisions.  Items to consider in your living will include:  · The use or non-use of life-sustaining equipment, such as dialysis machines and breathing machines (ventilators).  · A DNR or DNAR order, which is the instruction not to use cardiopulmonary resuscitation (CPR) if breathing or heartbeat stops.  · The use or non-use of tube feeding.  · Withholding of food and fluids.  · Comfort (palliative) care when the goal becomes comfort rather than a cure.  · Organ and tissue donation.  A living will does not give instructions for distributing your money and property if you should pass away. It is recommended that you seek the advice of a  when writing a will. Decisions about taxes, beneficiaries, and asset distribution will be legally binding. This process can relieve your family and friends of any concerns surrounding disputes or questions that may come up about  the distribution of your assets.  DNR or DNAR  A DNR or DNAR order is a request not to have CPR in the event that your heart stops beating or you stop breathing. If a DNR or DNAR order has not been made and shared, a health care provider will try to help any patient whose heart has stopped or who has stopped breathing. If you plan to have surgery, talk with your health care provider about how your DNR or DNAR order will be followed if problems occur.  Summary  · Advance directives are the legal documents that allow you to make choices ahead of time about your health care and medical treatment in case you become unable to communicate for yourself.  · The process of discussing and writing advance directives should happen over time. You can change the advance directives, even after you have signed them.  · Advance directives include DNR or DNAR orders, living mueller, and designating an agent as your medical power of .  This information is not intended to replace advice given to you by your health care provider. Make sure you discuss any questions you have with your health care provider.  Document Released: 03/26/2009 Document Revised: 01/22/2020 Document Reviewed: 11/06/2017  [employer] Patient Education © 2020 [employer]      Heart Disease Prevention  Heart disease is the leading cause of death in the world. Coronary artery disease is the most common cause of heart disease. This condition results when cholesterol and other substances (plaque) build up inside the walls of the blood vessels that supply your heart muscle (arteries). This buildup in arteries is called atherosclerosis. You can take actions to lower your risk of heart disease.  How can heart disease affect me?  Heart disease can cause many unpleasant symptoms and complications, such as:  · Chest pain (angina).  · Reduced or blocked blood flow to your heart. This can cause:  ? Irregular heartbeats (arrhythmias).  ? Heart attack.  ? Heart  failure.  What can increase my risk?  The following factors may make you more likely to develop this condition:  · High blood pressure (hypertension).  · High cholesterol.  · Smoking.  · A diet high in saturated fats or trans fats.  · Lack of physical activity.  · Obesity.  · Drinking too much alcohol.  · Diabetes.  · Having a family history of heart disease.  What actions can I take to prevent heart disease?  Nutrition    · Eat a heart-healthy eating plan as told by your health care provider. Examples include the DASH (Dietary Approaches to Stop Hypertension) eating plan or the Mediterranean diet.  · Generally, it is recommended that you:  ? Eat less salt (sodium). Ask your health care provider how much sodium is safe for you. Most people should have less than 2,300 mg each day.  ? Limit unhealthy fats, such as saturated and trans fats, in your diet. You can do this by eating low-fat dairy products, eating less red meat, and avoiding processed foods.  ? Eat healthy fats (omega-3 fatty acids). These are found in fish, such as mackerel or salmon.  ? Eat more fruits and vegetables. You should try to fill one-half of your plate with fruits and vegetables at each meal.  ? Eat more whole grains.  ? Avoid foods and drinks that have added sugars.  Lifestyle    · Get regular exercise. This is one of the most important things you can do for your health. Generally, it is recommended that you:  ? Exercise for at least 30 minutes on most days of the week (150 minutes each week). The exercise should increase your heart rate and make you sweat (aerobic exercise).  ? Add strength exercises on at least 2 days each week.  · Do not use any products that contain nicotine or tobacco, such as cigarettes and e-cigarettes. These can damage your heart and blood vessels. If you need help quitting, ask your health care provider.  Alcohol use  · Do not drink alcohol if:  ? Your health care provider tells you not to drink.  ? You are pregnant,  may be pregnant, or are planning to become pregnant.  · If you drink alcohol, limit how much you have:  ? 0-1 drink a day for women.  ? 0-2 drinks a day for men.  · Be aware of how much alcohol is in your drink. In the [place] one drink equals one typical bottle of beer (12 oz), one-half glass of wine (5 oz), or one shot of hard liquor (1½ oz).  Medicines  · Take over-the-counter and prescription medicines only as told by your health care provider.  · Ask your health care provider whether you should take an aspirin every day. Taking aspirin may help reduce your risk of heart disease and stroke.  · Depending on your risk factors, your health care provider may prescribe medicines to lower your risk of heart disease or to control related conditions. You may take medicine to:  ? Lower cholesterol.  ? Control blood pressure.  ? Control diabetes.  General information  · Keep your blood pressure under control, as recommended by your health care provider. For most healthy people, the upper number of your blood pressure (systolic) should be no higher than 120, and the lower number (diastolic) no higher than 80. Treatment may be needed if your blood pressure is higher than 130/80.  · Have your blood pressure checked at least every two years. Your health care provider may check your blood pressure more often if you have high blood pressure.  · After age 20, have your cholesterol checked every 4-6 years. If you have risk factors for heart disease, you may need to have it checked more frequently. Treatment may be needed if your cholesterol is high.  · Have your body mass index (BMI) checked every year. Your health care provider can calculate your BMI from your height and weight.  · Work with your health care provider to lose weight, if needed, or to maintain a healthy weight.  Where to find more information:  · Centers for Disease Control and Prevention: www.cdc.gov/heartdisease  · American Heart Association: www.heart.org  ? Take  a free online heart disease risk quiz to better understand your personal risk factors.  Summary  · Heart disease is the leading cause of death in the world.  · Heart disease can cause chest pain, abnormal heart rhythms, heart attack, and heart failure.  · High blood pressure, high cholesterol, and smoking are the main risk factors for heart disease, although other factors also contribute.  · You can take actions to lower your chances of developing heart disease. Work with your health care provider to reduce your risk by following a heart-healthy diet, being physically active, and controlling your weight, blood pressure, and cholesterol level.  This information is not intended to replace advice given to you by your health care provider. Make sure you discuss any questions you have with your health care provider.  Document Released: 08/01/2005 Document Revised: 01/02/2019 Document Reviewed: 01/02/2019  [employer] Patient Education © 2020 [employer]      Colorectal Cancer Screening    Colorectal cancer screening is a group of tests that are used to check for colorectal cancer before symptoms develop. Colorectal refers to the colon and rectum. The colon and rectum are located at the end of the digestive tract and carry bowel movements out of the body.  Who should have screening?  All adults starting at age 50 until age 75 should have screening. Your health care provider may recommend screening at age 45. You will have tests every 1-10 years, depending on your results and the type of screening test.  You may have screening tests starting at an earlier age, or more frequently than other people, if you have any of the following risk factors:  · A personal or family history of colorectal cancer or abnormal growths (polyps).  · Inflammatory bowel disease, such as ulcerative colitis or Crohn's disease.  · A history of having radiation treatment to the abdomen or pelvic area for cancer.  · Colorectal cancer symptoms, such as  changes in bowel habits or blood in your stool.  · A type of colon cancer syndrome that is passed from parent to child (hereditary), such as:  ? Fajardo syndrome.  ? Familial adenomatous polyposis.  ? Turcot syndrome.  ? Peutz-Jeghers syndrome.  Screening recommendations for adults who are 75-85 years old vary depending on health.  How is screening done?  There are several types of colorectal screening tests. You may have one or more of the following:  · Guaiac-based fecal occult blood testing. For this test, a stool (feces) sample is checked for hidden (occult) blood, which could be a sign of colorectal cancer.  · Fecal immunochemical test (FIT). For this test, a stool sample is checked for blood, which could be a sign of colorectal cancer.  · Stool DNA test. For this test, a stool sample is checked for blood and changes in DNA that could lead to colorectal cancer.  · Sigmoidoscopy. During this test, a thin, flexible tube with a camera on the end (sigmoidoscope) is used to examine the rectum and the lower colon.  · Colonoscopy. During this test, a long, flexible tube with a camera on the end (colonoscope) is used to examine the entire colon and rectum. With a colonoscopy, it is possible to take a sample of tissue (biopsy) and remove small polyps during the test.  · Virtual colonoscopy. Instead of a colonoscope, this type of colonoscopy uses X-rays (CT scan) and computers to produce images of the colon and rectum.  What are the benefits of screening?  Screening reduces your risk for colorectal cancer and can help identify cancer at an early stage, when the cancer can be removed or treated more easily. It is common for polyps to form in the lining of the colon, especially as you age. These polyps may be cancerous or become cancerous over time. Screening can identify these polyps.  What are the risks of screening?  Each screening test may have different risks.  · Stool sample tests have fewer risks than other types of  screening tests. However, you may need more tests to confirm results from a stool sample test.  · Screening tests that involve X-rays expose you to low levels of radiation, which may slightly increase your cancer risk. The benefit of detecting cancer outweighs the slight increase in risk.  · Screening tests such as sigmoidoscopy and colonoscopy may place you at risk for bleeding, intestinal damage, infection, or a reaction to medicines given during the exam.  Talk with your health care provider to understand your risk for colorectal cancer and to make a screening plan that is right for you.  Questions to ask your health care provider  · When should I start colorectal cancer screening?  · What is my risk for colorectal cancer?  · How often do I need screening?  · Which screening tests do I need?  · How do I get my test results?  · What do my results mean?  Where to find more information  Learn more about colorectal cancer screening from:  · The American Cancer Society: www.cancer.org  · The National Cancer [place]: www.cancer.gov  Summary  · Colorectal cancer screening is a group of tests used to check for colorectal cancer before symptoms develop.  · Screening reduces your risk for colorectal cancer and can help identify cancer at an early stage, when the cancer can be removed or treated more easily.  · All adults starting at age 50 until age 75 should have screening. Your health care provider may recommend screening at age 45.  · You may have screening tests starting at an earlier age, or more frequently than other people, if you have certain risk factors.  · Talk with your health care provider to understand your risk for colorectal cancer and to make a screening plan that is right for you.  This information is not intended to replace advice given to you by your health care provider. Make sure you discuss any questions you have with your health care provider.  Document Released: 06/07/2011 Document Revised:  04/08/2020 Document Reviewed: 09/19/2018  [employer] Patient Education © 2020 [employer]

## 2020-07-23 NOTE — PROGRESS NOTES
The ABCs of the Annual Wellness Visit  Subsequent Medicare Wellness Visit      Subjective   History of Present Illness:  Ed Villalba is a 65 y.o. male who presents for a Subsequent Medicare Wellness Visit.    Urinary Frequency  There has been no change in his frequency or nocturia since last here.  His symptoms remain quite labile.  He states that he can have anywhere from 2-12 episodes of nocturia.  He continues to deny any difficulty voiding, urgency, dysuria, or hematuria.  He has been tried on anticholinergic medications as well as myrbetriq with no consistent benefit.  He was previously followed by urology    Diabetes  Current symptoms include polyuria. Patient denies paresthesia of the feet, visual disturbances, polydipsia, hypoglycemia and foot ulcerations. Evaluation to date has been: hemoglobin A1C. Home sugars: continues to check once every day or two and generally remains at goal. Current treatments - metformin and sitagliptin.  He appears to be taking these as prescribed and denies any apparent side effects.   He has had no recent labs     Dyslipidemia  Compliance with treatment remains excellent. The patient exercises daily. He is currently being prescribed the following medication for his dyslipidemia - atorvastatin. Patient denies side effects associated with his medications.    Hypertension  Home blood pressure readings: are consistently at goal. Associated signs and symptoms: none. Patient denies: chest pain, palpitations, dyspnea, orthopnea, paroxysmal nocturnal dyspnea and peripheral edema. Current antihypertensive medications includes - lisinopril. Medication compliance: taking as prescribed.     HEALTH RISK ASSESSMENT    Recent Hospitalizations:  No hospitalization(s) within the last year.    Current Medical Providers:  Patient Care Team:  Dwight Borges MD as PCP - General (Family Medicine)    Smoking Status:  Social History     Tobacco Use   Smoking Status Never Smoker   Smokeless  Tobacco Current User   • Types: Snuff       Alcohol Consumption:  Social History     Substance and Sexual Activity   Alcohol Use No       Depression Screen:   PHQ-2/PHQ-9 Depression Screening 7/23/2020   Little interest or pleasure in doing things 0   Feeling down, depressed, or hopeless 1   Trouble falling or staying asleep, or sleeping too much 0   Feeling tired or having little energy 0   Poor appetite or overeating 0   Feeling bad about yourself - or that you are a failure or have let yourself or your family down 0   Trouble concentrating on things, such as reading the newspaper or watching television 0   Moving or speaking so slowly that other people could have noticed. Or the opposite - being so fidgety or restless that you have been moving around a lot more than usual 0   Thoughts that you would be better off dead, or of hurting yourself in some way 0   Total Score 1   If you checked off any problems, how difficult have these problems made it for you to do your work, take care of things at home, or get along with other people? Not difficult at all       Fall Risk Screen:  STEADI Fall Risk Assessment was completed, and patient is at LOW risk for falls.Assessment completed on:7/23/2020    Health Habits and Functional and Cognitive Screening:  Functional & Cognitive Status 7/23/2020   Do you have difficulty preparing food and eating? No   Do you have difficulty bathing yourself, getting dressed or grooming yourself? No   Do you have difficulty using the toilet? No   Do you have difficulty moving around from place to place? No   Do you have trouble with steps or getting out of a bed or a chair? No   Current Diet Well Balanced Diet   Dental Exam Up to date   Eye Exam Not up to date   Exercise (times per week) 5 times per week   Current Exercise Activities Include Walking   Do you need help using the phone?  No   Are you deaf or do you have serious difficulty hearing?  No   Do you need help with transportation? No    Do you need help shopping? No   Do you need help preparing meals?  No   Do you need help with housework?  No   Do you need help with laundry? No   Do you need help taking your medications? No   Do you need help managing money? No   Do you ever drive or ride in a car without wearing a seat belt? No   Have you felt unusual stress, anger or loneliness in the last month? No   Who do you live with? Spouse   If you need help, do you have trouble finding someone available to you? No   Have you been bothered in the last four weeks by sexual problems? No   Do you have difficulty concentrating, remembering or making decisions? No         Does the patient have evidence of cognitive impairment? No    Asprin use counseling:Taking ASA appropriately as indicated    Age-appropriate Screening Schedule:  Refer to the list below for future screening recommendations based on patient's age, sex and/or medical conditions. Orders for these recommended tests are listed in the plan section. The patient has been provided with a written plan.    Health Maintenance   Topic Date Due   • ZOSTER VACCINE (1 of 2) 04/26/2005   • DIABETIC EYE EXAM  09/04/2019   • TDAP/TD VACCINES (2 - Td) 01/01/2020   • DIABETIC FOOT EXAM  01/07/2020   • HEMOGLOBIN A1C  07/14/2020   • INFLUENZA VACCINE  09/01/2020 (Originally 8/1/2020)   • LIPID PANEL  01/14/2021   • URINE MICROALBUMIN  01/14/2021   • COLONOSCOPY  03/02/2023          The following portions of the patient's history were reviewed and updated as appropriate: allergies, current medications, past family history, past medical history, past social history, past surgical history and problem list.    Outpatient Medications Prior to Visit   Medication Sig Dispense Refill   • ASPIRIN LOW DOSE 81 MG EC tablet TAKE 1 TABLET BY MOUTH EVERY DAY 30 tablet 5   • atorvastatin (LIPITOR) 20 MG tablet TAKE 1 TABLET BY MOUTH EVERY DAY 30 tablet 5   • cholecalciferol (VITAMIN D3) 25 MCG (1000 UT) tablet TAKE 1 TABLET BY  MOUTH ONCE DAILY 30 tablet 5   • Lancets (ONETOUCH DELICA PLUS [id number]) misc USE TO TEST BLOOD GLUCOSE EVERY  each 3   • lisinopril (PRINIVIL,ZESTRIL) 10 MG tablet Take 1 tablet by mouth Daily. 30 tablet 5   • loratadine (CLARITIN) 10 MG tablet Take 1 tablet by mouth Daily. 30 tablet 5   • meloxicam (MOBIC) 15 MG tablet TAKE 1 TABLET BY MOUTH DAILY 30 tablet 5   • metFORMIN ER (GLUCOPHAGE-XR) 500 MG 24 hr tablet Take 1 tablet by mouth Daily. 30 tablet 5   • MYRBETRIQ 50 MG tablet sustained-release 24 hour 24 hr tablet TAKE 1 TABLET BY MOUTH ONCE DAILY 30 tablet 5   • ONE TOUCH ULTRA TEST test strip TEST BLOOD GLUCOSE EVERY  each 5   • pantoprazole (PROTONIX) 40 MG EC tablet TAKE 1 TABLET BY MOUTH ONCE DAILY 30 tablet 5   • SITagliptin (Januvia) 100 MG tablet Take 1 tablet by mouth Daily. 30 tablet 5   • triamcinolone (KENALOG) 0.1 % lotion APPLY EXTERNALLY TO THE AFFECTED AREA TWICE DAILY 120 mL 5   • Zoster Vac Recomb Adjuvanted (Shingrix) 50 MCG/0.5ML reconstituted suspension Inject 0.5 mL into the appropriate muscle as directed by prescriber See Admin Instructions. Repeat in 2-6 months 1 each 1     No facility-administered medications prior to visit.        Patient Active Problem List   Diagnosis   • Dips tobacco   • Essential hypertension   • Mixed hyperlipidemia   • Type 2 diabetes mellitus with diabetic neuropathy, without long-term current use of insulin (CMS/[facility])   • Urinary frequency   • Chronic seasonal allergic rhinitis   • Gastro-esophageal reflux disease without esophagitis   • Seborrheic dermatitis   • Healthcare maintenance   • Encounter for immunization   • Abrasion     Advanced Care Planning:  ACP discussion was held with the patient during this visit. Patient does not have an advance directive, information provided.    Review of Systems   Constitutional: Positive for fatigue. Negative for appetite change, chills, fever and unexpected weight change.   HENT: Positive for rhinorrhea  "and sneezing. Negative for congestion, ear pain, postnasal drip, sinus pressure and sore throat.    Eyes: Negative for visual disturbance.   Respiratory: Negative for cough, shortness of breath and wheezing.    Cardiovascular: Negative for chest pain, palpitations and leg swelling.   Gastrointestinal: Negative for abdominal pain, blood in stool, constipation, diarrhea, nausea and vomiting.   Endocrine: Positive for polyuria. Negative for polydipsia.   Genitourinary: Positive for difficulty urinating (intermittent hesitancy) and frequency. Negative for dysuria, hematuria and urgency.        Nocturia × 1-4 at present   Musculoskeletal: Positive for arthralgias. Negative for back pain and myalgias.   Skin: Negative for rash.   Neurological: Negative for weakness, numbness and headaches.   Psychiatric/Behavioral: Positive for sleep disturbance (due to nocturia). Negative for dysphoric mood. The patient is not nervous/anxious.      Compared to one year ago, the patient feels his physical health is the same.  Compared to one year ago, the patient feels his mental health is the same.    Reviewed chart for potential of high risk medication in the elderly: yes  Reviewed chart for potential of harmful drug interactions in the elderly:yes    Objective         Vitals:    07/23/20 1402   BP: 122/65   Pulse: 78   Resp: 12   Temp: 97.3 °F (36.3 °C)   TempSrc: Temporal   SpO2: 95%   Weight: 85.7 kg (189 lb)   Height: 177.8 cm (70\")       Body mass index is 27.12 kg/m².  Discussed the patient's BMI with him. The BMI is above average; BMI management plan is completed.    Physical Exam   Constitutional: He is oriented to person, place, and time. He appears well-developed and well-nourished. He is cooperative. He does not have a sickly appearance. No distress.   Accompanied by his wife.  Bright, in good spirits.  No apparent distress.  No pallor, cyanosis, diaphoresis, or jaundice   HENT:   Head: Atraumatic.   Eyes: Pupils are equal, " round, and reactive to light. EOM are normal. No scleral icterus.   Neck: No JVD present. Carotid bruit is not present. No tracheal deviation present. No thyromegaly present.   Cardiovascular: Normal rate, regular rhythm, S1 normal, S2 normal, normal heart sounds and intact distal pulses. Exam reveals no gallop.   No murmur heard.  Pulmonary/Chest: Breath sounds normal. He has no wheezes. He has no rales.   Musculoskeletal: He exhibits no tenderness or deformity.   Lymphadenopathy:        Head (right side): No submandibular adenopathy present.        Head (left side): No submandibular adenopathy present.     He has no cervical adenopathy.   Neurological: He is alert and oriented to person, place, and time. A sensory deficit (decreased vibration sense both feet) is present. No cranial nerve deficit. He exhibits normal muscle tone. Coordination normal.   Skin: Skin is warm and dry. Abrasion (clean healing abrasion dorsum right radial hand) noted. No rash noted. He is not diaphoretic. No pallor. Nails show no clubbing.   Psychiatric: He has a normal mood and affect. His behavior is normal.     Assessment/Plan   Medicare Risks and Personalized Health Plan  CMS Preventative Services Quick Reference  Advance Directive Discussion  Cardiovascular risk  Colon Cancer Screening  Immunizations Discussed/Encouraged (specific immunizations; adacel Tdap, Hepatitis A Vaccine/Series, Influenza and Shingrix )  Tobacco Use/Dependance (use dotphrase .tobaccocessation for documentation)    The above risks/problems have been discussed with the patient.  Pertinent information has been shared with the patient in the After Visit Summary.  Follow up plans and orders are seen below in the Assessment/Plan Section.    Diagnoses and all orders for this visit:    1. Mixed hyperlipidemia   Encouraged to continue to work on his diet and exercise plan.  Continue current medication  -     Lipid Panel  -     TSH    2. Essential hypertension  As above.    Continue current medication.  -     CBC & Differential  -     Comprehensive Metabolic Panel  -     CBC Auto Differential    3. Chronic seasonal allergic rhinitis    4. Gastro-esophageal reflux disease without esophagitis    5. Type 2 diabetes mellitus with diabetic neuropathy, without long-term current use of insulin (CMS/[facility])  Diabetes mellitus Type II, under unknown control.   Encouraged to continue to pursue ADA diet  Encouraged aerobic exercise.  Continue current medication  Reminded to undergo an updated diabetic eye exam  Updated labs drawn.  -     Hemoglobin A1c.  -     MicroAlbumin, Urine, Random - Urine, Clean Catch    6. Urinary frequency  Patient remains uninterested in a urology reassessment  Encouraged to report if this should change.    7. Healthcare maintenance  Encouraged to follow-up with hepatitis A #2 and Shingrix  Recommended an updated Tdap  Recommended an updated Pneumovax 23 with his flu shot this fall  Patient remains uninterested in colon or prostate cancer screening  -     Vitamin D 25 Hydroxy  -     Discontinue: Tdap (BOOSTRIX) 5-2.5-18.5 LF-MCG/0.5 injection; Inject 0.5 mL into the appropriate muscle as directed by prescriber 1 (One) Time for 1 dose.  Dispense: 1 each; Refill: 0    8. Dips tobacco  Reminded regarding the risks of tobacco use as well as the options with respect to cessation.  Patient will consider    9. Abrasion  As above.  -     Discontinue: Tdap (BOOSTRIX) 5-2.5-18.5 LF-MCG/0.5 injection; Inject 0.5 mL into the appropriate muscle as directed by prescriber 1 (One) Time for 1 dose.  Dispense: 1 each; Refill: 0  -     Tdap Vaccine Greater Than or Equal To 8yo IM    Follow Up:  Return in about 3 months (around 10/30/2020).     An After Visit Summary and PPPS were given to the patient.

## 2020-07-24 LAB
25(OH)D3 SERPL-MCNC: 32.7 NG/ML (ref 30–100)
ALBUMIN SERPL-MCNC: 4 G/DL (ref 3.5–5.2)
ALBUMIN/GLOB SERPL: 1.3 G/DL
ALP SERPL-CCNC: 51 U/L (ref 39–117)
ALT SERPL W P-5'-P-CCNC: 20 U/L (ref 1–41)
ANION GAP SERPL CALCULATED.3IONS-SCNC: 9.6 MMOL/L (ref 5–15)
AST SERPL-CCNC: 19 U/L (ref 1–40)
BASOPHILS # BLD AUTO: 0.09 10*3/MM3 (ref 0–0.2)
BASOPHILS NFR BLD AUTO: 1 % (ref 0–1.5)
BILIRUB SERPL-MCNC: 0.3 MG/DL (ref 0–1.2)
BUN SERPL-MCNC: 14 MG/DL (ref 8–23)
BUN/CREAT SERPL: 16.9 (ref 7–25)
CALCIUM SPEC-SCNC: 9.4 MG/DL (ref 8.6–10.5)
CHLORIDE SERPL-SCNC: 105 MMOL/L (ref 98–107)
CHOLEST SERPL-MCNC: 85 MG/DL (ref 0–200)
CO2 SERPL-SCNC: 24.4 MMOL/L (ref 22–29)
CREAT SERPL-MCNC: 0.83 MG/DL (ref 0.76–1.27)
DEPRECATED RDW RBC AUTO: 43.2 FL (ref 37–54)
EOSINOPHIL # BLD AUTO: 1.59 10*3/MM3 (ref 0–0.4)
EOSINOPHIL NFR BLD AUTO: 16.9 % (ref 0.3–6.2)
ERYTHROCYTE [DISTWIDTH] IN BLOOD BY AUTOMATED COUNT: 13 % (ref 12.3–15.4)
GFR SERPL CREATININE-BSD FRML MDRD: 93 ML/MIN/1.73
GLOBULIN UR ELPH-MCNC: 3.1 GM/DL
GLUCOSE SERPL-MCNC: 131 MG/DL (ref 65–99)
HBA1C MFR BLD: 6.6 % (ref 4.8–5.6)
HCT VFR BLD AUTO: 43.4 % (ref 37.5–51)
HDLC SERPL-MCNC: 32 MG/DL (ref 40–60)
HGB BLD-MCNC: 15 G/DL (ref 13–17.7)
IMM GRANULOCYTES # BLD AUTO: 0.03 10*3/MM3 (ref 0–0.05)
IMM GRANULOCYTES NFR BLD AUTO: 0.3 % (ref 0–0.5)
LDLC SERPL CALC-MCNC: 21 MG/DL (ref 0–100)
LDLC/HDLC SERPL: 0.67 {RATIO}
LYMPHOCYTES # BLD AUTO: 2.09 10*3/MM3 (ref 0.7–3.1)
LYMPHOCYTES NFR BLD AUTO: 22.3 % (ref 19.6–45.3)
MCH RBC QN AUTO: 31.3 PG (ref 26.6–33)
MCHC RBC AUTO-ENTMCNC: 34.6 G/DL (ref 31.5–35.7)
MCV RBC AUTO: 90.6 FL (ref 79–97)
MONOCYTES # BLD AUTO: 0.64 10*3/MM3 (ref 0.1–0.9)
MONOCYTES NFR BLD AUTO: 6.8 % (ref 5–12)
NEUTROPHILS NFR BLD AUTO: 4.95 10*3/MM3 (ref 1.7–7)
NEUTROPHILS NFR BLD AUTO: 52.7 % (ref 42.7–76)
NRBC BLD AUTO-RTO: 0 /100 WBC (ref 0–0.2)
PLATELET # BLD AUTO: 192 10*3/MM3 (ref 140–450)
PMV BLD AUTO: 10.5 FL (ref 6–12)
POTASSIUM SERPL-SCNC: 4.3 MMOL/L (ref 3.5–5.2)
PROT SERPL-MCNC: 7.1 G/DL (ref 6–8.5)
RBC # BLD AUTO: 4.79 10*6/MM3 (ref 4.14–5.8)
SODIUM SERPL-SCNC: 139 MMOL/L (ref 136–145)
TRIGL SERPL-MCNC: 158 MG/DL (ref 0–150)
TSH SERPL DL<=0.05 MIU/L-ACNC: 1.08 UIU/ML (ref 0.27–4.2)
VLDLC SERPL-MCNC: 31.6 MG/DL (ref 5–40)
WBC # BLD AUTO: 9.39 10*3/MM3 (ref 3.4–10.8)

## 2020-09-18 RX ORDER — METFORMIN HYDROCHLORIDE 500 MG/1
500 TABLET, EXTENDED RELEASE ORAL DAILY
Qty: 30 TABLET | Refills: 5 | Status: SHIPPED | OUTPATIENT
Start: 2020-09-18 | End: 2021-01-11

## 2020-09-18 RX ORDER — SITAGLIPTIN 100 MG/1
TABLET, FILM COATED ORAL
Qty: 30 TABLET | Refills: 5 | Status: SHIPPED | OUTPATIENT
Start: 2020-09-18 | End: 2021-01-11

## 2020-10-29 ENCOUNTER — OFFICE VISIT (OUTPATIENT)
Dept: FAMILY MEDICINE CLINIC | Facility: CLINIC | Age: 65
End: 2020-10-29

## 2020-10-29 VITALS
SYSTOLIC BLOOD PRESSURE: 142 MMHG | OXYGEN SATURATION: 95 % | DIASTOLIC BLOOD PRESSURE: 72 MMHG | HEART RATE: 94 BPM | WEIGHT: 193 LBS | HEIGHT: 70 IN | BODY MASS INDEX: 27.63 KG/M2 | RESPIRATION RATE: 12 BRPM | TEMPERATURE: 96.9 F

## 2020-10-29 DIAGNOSIS — Z00.00 HEALTHCARE MAINTENANCE: ICD-10-CM

## 2020-10-29 DIAGNOSIS — E11.40 TYPE 2 DIABETES MELLITUS WITH DIABETIC NEUROPATHY, WITHOUT LONG-TERM CURRENT USE OF INSULIN (HCC): ICD-10-CM

## 2020-10-29 DIAGNOSIS — K21.9 GASTRO-ESOPHAGEAL REFLUX DISEASE WITHOUT ESOPHAGITIS: ICD-10-CM

## 2020-10-29 DIAGNOSIS — Z72.0 DIPS TOBACCO: ICD-10-CM

## 2020-10-29 DIAGNOSIS — E78.2 MIXED HYPERLIPIDEMIA: Primary | ICD-10-CM

## 2020-10-29 DIAGNOSIS — I10 ESSENTIAL HYPERTENSION: ICD-10-CM

## 2020-10-29 DIAGNOSIS — R35.0 URINARY FREQUENCY: ICD-10-CM

## 2020-10-29 DIAGNOSIS — J30.2 CHRONIC SEASONAL ALLERGIC RHINITIS: ICD-10-CM

## 2020-10-29 PROBLEM — T14.8XXA ABRASION: Status: RESOLVED | Noted: 2020-07-23 | Resolved: 2020-10-29

## 2020-10-29 PROCEDURE — 99214 OFFICE O/P EST MOD 30 MIN: CPT | Performed by: GENERAL PRACTICE

## 2020-10-29 RX ORDER — PANTOPRAZOLE SODIUM 40 MG/1
40 TABLET, DELAYED RELEASE ORAL DAILY
Qty: 30 TABLET | Refills: 5 | Status: SHIPPED | OUTPATIENT
Start: 2020-10-29 | End: 2021-02-23 | Stop reason: SDUPTHER

## 2020-10-29 RX ORDER — MELATONIN
1000 DAILY
Qty: 30 TABLET | Refills: 5 | Status: SHIPPED | OUTPATIENT
Start: 2020-10-29 | End: 2021-02-23 | Stop reason: SDUPTHER

## 2020-10-29 NOTE — PROGRESS NOTES
Subjective   Ed Villalba is a 65 y.o. male.     History of Present Illness     Urinary Frequency  He resumed myrbetriq several months ago after experiencing a prolonged episode of urinary frequency and nocturia.  He feels that it helps significantly and has been taking it on and off since.  He continues to have anywhere from 2-12 episodes of nocturia.  There is no history of any difficulty voiding, urgency, dysuria, or hematuria.  He has been tried on anticholinergic medications with no consistent benefit.  He was previously followed by urology    Diabetes  Current symptoms include polyuria. Patient denies paresthesia of the feet, visual disturbances, polydipsia, hypoglycemia or foot ulcerations. Evaluation to date has been: hemoglobin A1C. Home sugars: continues to check once every day or two and generally remains at goal. Current treatments - metformin and sitagliptin.  He appears to be taking these as prescribed and denies any apparent side effects.     Lab Results   Component Value Date    HGBA1C 6.60 (H) 07/23/2020      Dyslipidemia  Compliance with treatment remains excellent. The patient exercises daily. He remains on atorvastatin.   Lab Results   Component Value Date    CHOL 85 07/23/2020    CHLPL 88 05/02/2016    TRIG 158 (H) 07/23/2020    HDL 32 (L) 07/23/2020    LDL 21 07/23/2020     Hypertension  Home blood pressure readings: are consistently at goal. Associated signs and symptoms: none. Patient denies: chest pain, palpitations, dyspnea, orthopnea, paroxysmal nocturnal dyspnea and peripheral edema. Current antihypertensive medications includes - lisinopril. Medication compliance: taking as prescribed.   Lab Results   Component Value Date    GLUCOSE 131 (H) 07/23/2020    BUN 14 07/23/2020    CREATININE 0.83 07/23/2020    EGFRIFNONA 93 07/23/2020    BCR 16.9 07/23/2020    K 4.3 07/23/2020    CO2 24.4 07/23/2020    CALCIUM 9.4 07/23/2020    ALBUMIN 4.00 07/23/2020    LABIL2 1.4 (L) 05/02/2016    AST 19  07/23/2020    ALT 20 07/23/2020     Labs  Lab Results   Component Value Date    TSH 1.080 07/23/2020     Lab Results   Component Value Date    WBC 9.39 07/23/2020    HGB 15.0 07/23/2020    HCT 43.4 07/23/2020    MCV 90.6 07/23/2020     07/23/2020     The following portions of the patient's history were reviewed and updated as appropriate: allergies, current medications, past medical history, past social history and problem list.    Review of Systems   Constitutional: Positive for fatigue. Negative for appetite change, chills, fever and unexpected weight change.   HENT: Positive for rhinorrhea and sneezing. Negative for congestion, ear pain, postnasal drip, sinus pressure and sore throat.    Eyes: Negative for visual disturbance.   Respiratory: Negative for cough, shortness of breath and wheezing.    Cardiovascular: Negative for chest pain, palpitations and leg swelling.   Gastrointestinal: Negative for abdominal pain, blood in stool, constipation, diarrhea, nausea and vomiting.   Endocrine: Positive for polyuria. Negative for polydipsia.   Genitourinary: Positive for difficulty urinating (intermittent hesitancy) and frequency. Negative for dysuria, hematuria and urgency.        Nocturia × 1-4 at present   Musculoskeletal: Positive for arthralgias. Negative for back pain and myalgias.   Skin: Negative for rash.   Neurological: Negative for weakness, numbness and headaches.   Psychiatric/Behavioral: Positive for sleep disturbance (due to nocturia). Negative for dysphoric mood. The patient is not nervous/anxious.      Objective   Physical Exam  Constitutional:       General: He is not in acute distress.     Appearance: Normal appearance. He is well-developed. He is not ill-appearing or diaphoretic.      Comments: Accompanied by his wife   HENT:      Head: Atraumatic.      Right Ear: Tympanic membrane, ear canal and external ear normal.      Left Ear: Tympanic membrane, ear canal and external ear normal.   Eyes:       Conjunctiva/sclera: Conjunctivae normal.   Neck:      Thyroid: No thyroid mass or thyromegaly.      Vascular: No carotid bruit or JVD.      Trachea: Trachea normal. No tracheal deviation.   Cardiovascular:      Rate and Rhythm: Normal rate and regular rhythm.      Heart sounds: Normal heart sounds, S1 normal and S2 normal. No murmur. No gallop. No S3 or S4 sounds.    Pulmonary:      Effort: Pulmonary effort is normal.      Breath sounds: Normal breath sounds.   Abdominal:      General: Bowel sounds are normal. There is no distension.      Palpations: Abdomen is soft. There is no hepatomegaly, splenomegaly or mass.      Tenderness: There is no abdominal tenderness.      Hernia: No hernia is present.   Musculoskeletal:      Right lower leg: No edema.      Left lower leg: No edema.   Lymphadenopathy:      Head:      Right side of head: No submental, submandibular, tonsillar, preauricular, posterior auricular or occipital adenopathy.      Left side of head: No submental, submandibular, tonsillar, preauricular, posterior auricular or occipital adenopathy.      Cervical: No cervical adenopathy.      Upper Body:      Right upper body: No supraclavicular adenopathy.      Left upper body: No supraclavicular adenopathy.   Skin:     General: Skin is warm and dry.      Coloration: Skin is not cyanotic, jaundiced or pale.      Findings: No rash.      Nails: There is no clubbing.     Neurological:      Mental Status: He is alert and oriented to person, place, and time.      Cranial Nerves: No cranial nerve deficit.      Sensory: Sensory deficit (decreased vibration sense toes of both feet) present.      Motor: No tremor.      Coordination: Coordination normal.      Gait: Gait normal.   Psychiatric:         Attention and Perception: Attention normal.         Mood and Affect: Mood normal.         Speech: Speech normal.         Behavior: Behavior normal.       Assessment/Plan   Problems Addressed this Visit         Cardiovascular and Mediastinum    Essential hypertension   Hypertension: elevated today. Evidence of target organ damage: none.  Encouraged to continue to work on diet and exercise plan.   Continue current medication for now  If elevated at his return will modify his antihypertensive regimen    Mixed hyperlipidemia   As above.   Continue current medication.       Respiratory    Chronic seasonal allergic rhinitis       Digestive    Gastro-esophageal reflux disease without esophagitis    Relevant Medications    pantoprazole (PROTONIX) 40 MG EC tablet       Endocrine    Type 2 diabetes mellitus with diabetic neuropathy, without long-term current use of insulin (CMS/[facility])  Diabetes mellitus Type II, under excellent control.   Encouraged to continue to pursue ADA diet  Encouraged aerobic exercise.  Continue current medication  Updated labs will be drawn at his return.       Genitourinary    Urinary frequency  Continue current medication  Patient remains uninterested in a urology reassessment  Encouraged to report if any worse or if any new symptoms or concerns.    Relevant Medications    Mirabegron ER (Myrbetriq) 50 MG tablet sustained-release 24 hour 24 hr tablet       Other    Dips tobacco    Healthcare maintenance  Patient uninterested in a flu shot  Reminded that he is due for hepatitis A #2 and Shingrix    Relevant Medications    cholecalciferol (VITAMIN D3) 25 MCG (1000 UT) tablet      Diagnoses       Codes Comments    Mixed hyperlipidemia    -  Primary ICD-10-CM: E78.2  ICD-9-CM: 272.2     Essential hypertension     ICD-10-CM: I10  ICD-9-CM: 401.9     Chronic seasonal allergic rhinitis     ICD-10-CM: J30.2  ICD-9-CM: 477.8     Gastro-esophageal reflux disease without esophagitis     ICD-10-CM: K21.9  ICD-9-CM: 530.81     Type 2 diabetes mellitus with diabetic neuropathy, without long-term current use of insulin (CMS/[facility])     ICD-10-CM: E11.40  ICD-9-CM: 250.60, 357.2     Urinary frequency     ICD-10-CM:  R35.0  ICD-9-CM: 788.41     Dips tobacco     ICD-10-CM: Z72.0  ICD-9-CM: 305.1     Healthcare maintenance     ICD-10-CM: Z00.00  ICD-9-CM: V70.0

## 2020-11-23 DIAGNOSIS — I10 ESSENTIAL HYPERTENSION: ICD-10-CM

## 2020-11-23 RX ORDER — LISINOPRIL 10 MG/1
10 TABLET ORAL DAILY
Qty: 30 TABLET | Refills: 5 | Status: SHIPPED | OUTPATIENT
Start: 2020-11-23 | End: 2021-02-23 | Stop reason: SDUPTHER

## 2020-12-23 DIAGNOSIS — E11.40 TYPE 2 DIABETES MELLITUS WITH DIABETIC NEUROPATHY (HCC): ICD-10-CM

## 2020-12-23 RX ORDER — LANCETS 30 GAUGE
EACH MISCELLANEOUS
Qty: 100 EACH | Refills: 3 | Status: SHIPPED | OUTPATIENT
Start: 2020-12-23 | End: 2021-04-07

## 2021-01-11 RX ORDER — METFORMIN HYDROCHLORIDE 500 MG/1
500 TABLET, EXTENDED RELEASE ORAL DAILY
Qty: 30 TABLET | Refills: 5 | Status: SHIPPED | OUTPATIENT
Start: 2021-01-11 | End: 2021-02-23 | Stop reason: SDUPTHER

## 2021-01-11 RX ORDER — SITAGLIPTIN 100 MG/1
TABLET, FILM COATED ORAL
Qty: 30 TABLET | Refills: 5 | Status: SHIPPED | OUTPATIENT
Start: 2021-01-11 | End: 2021-02-23 | Stop reason: SDUPTHER

## 2021-01-18 RX ORDER — ATORVASTATIN CALCIUM 20 MG/1
TABLET, FILM COATED ORAL
Qty: 30 TABLET | Refills: 5 | Status: SHIPPED | OUTPATIENT
Start: 2021-01-18 | End: 2021-02-23 | Stop reason: SDUPTHER

## 2021-01-18 RX ORDER — MELOXICAM 15 MG/1
15 TABLET ORAL DAILY
Qty: 30 TABLET | Refills: 5 | Status: SHIPPED | OUTPATIENT
Start: 2021-01-18 | End: 2021-02-23 | Stop reason: SDUPTHER

## 2021-02-23 ENCOUNTER — OFFICE VISIT (OUTPATIENT)
Dept: FAMILY MEDICINE CLINIC | Facility: CLINIC | Age: 66
End: 2021-02-23

## 2021-02-23 DIAGNOSIS — I10 ESSENTIAL HYPERTENSION: ICD-10-CM

## 2021-02-23 DIAGNOSIS — R35.0 URINARY FREQUENCY: ICD-10-CM

## 2021-02-23 DIAGNOSIS — E78.2 MIXED HYPERLIPIDEMIA: ICD-10-CM

## 2021-02-23 DIAGNOSIS — L21.9 SEBORRHEIC DERMATITIS: ICD-10-CM

## 2021-02-23 DIAGNOSIS — Z00.00 HEALTHCARE MAINTENANCE: ICD-10-CM

## 2021-02-23 DIAGNOSIS — J30.2 CHRONIC SEASONAL ALLERGIC RHINITIS: Primary | ICD-10-CM

## 2021-02-23 DIAGNOSIS — E11.40 TYPE 2 DIABETES MELLITUS WITH DIABETIC NEUROPATHY, WITHOUT LONG-TERM CURRENT USE OF INSULIN (HCC): ICD-10-CM

## 2021-02-23 DIAGNOSIS — Z72.0 DIPS TOBACCO: ICD-10-CM

## 2021-02-23 DIAGNOSIS — K21.9 GASTRO-ESOPHAGEAL REFLUX DISEASE WITHOUT ESOPHAGITIS: ICD-10-CM

## 2021-02-23 PROCEDURE — 80061 LIPID PANEL: CPT | Performed by: GENERAL PRACTICE

## 2021-02-23 PROCEDURE — 85025 COMPLETE CBC W/AUTO DIFF WBC: CPT | Performed by: GENERAL PRACTICE

## 2021-02-23 PROCEDURE — 99214 OFFICE O/P EST MOD 30 MIN: CPT | Performed by: GENERAL PRACTICE

## 2021-02-23 PROCEDURE — 83036 HEMOGLOBIN GLYCOSYLATED A1C: CPT | Performed by: GENERAL PRACTICE

## 2021-02-23 PROCEDURE — 36415 COLL VENOUS BLD VENIPUNCTURE: CPT | Performed by: GENERAL PRACTICE

## 2021-02-23 PROCEDURE — 82043 UR ALBUMIN QUANTITATIVE: CPT | Performed by: GENERAL PRACTICE

## 2021-02-23 PROCEDURE — 80053 COMPREHEN METABOLIC PANEL: CPT | Performed by: GENERAL PRACTICE

## 2021-02-23 RX ORDER — MELATONIN
1000 DAILY
Qty: 30 TABLET | Refills: 5 | Status: SHIPPED | OUTPATIENT
Start: 2021-02-23 | End: 2022-04-04 | Stop reason: SDUPTHER

## 2021-02-23 RX ORDER — PANTOPRAZOLE SODIUM 40 MG/1
40 TABLET, DELAYED RELEASE ORAL DAILY
Qty: 30 TABLET | Refills: 5 | Status: SHIPPED | OUTPATIENT
Start: 2021-02-23 | End: 2021-04-19

## 2021-02-23 RX ORDER — ATORVASTATIN CALCIUM 20 MG/1
20 TABLET, FILM COATED ORAL NIGHTLY
Qty: 30 TABLET | Refills: 5 | Status: SHIPPED | OUTPATIENT
Start: 2021-02-23 | End: 2022-01-10

## 2021-02-23 RX ORDER — LORATADINE 10 MG/1
10 TABLET ORAL DAILY
Qty: 30 TABLET | Refills: 5 | Status: SHIPPED | OUTPATIENT
Start: 2021-02-23 | End: 2021-08-23

## 2021-02-23 RX ORDER — SULFAMETHOXAZOLE AND TRIMETHOPRIM 800; 160 MG/1; MG/1
1 TABLET ORAL 2 TIMES DAILY
Qty: 20 TABLET | Refills: 0 | Status: SHIPPED | OUTPATIENT
Start: 2021-02-23 | End: 2021-03-05

## 2021-02-23 RX ORDER — MELOXICAM 15 MG/1
15 TABLET ORAL DAILY
Qty: 30 TABLET | Refills: 5 | Status: SHIPPED | OUTPATIENT
Start: 2021-02-23 | End: 2021-10-13

## 2021-02-23 RX ORDER — ASPIRIN 81 MG/1
81 TABLET ORAL DAILY
Qty: 30 TABLET | Refills: 5 | Status: SHIPPED | OUTPATIENT
Start: 2021-02-23 | End: 2022-04-04 | Stop reason: SDUPTHER

## 2021-02-23 RX ORDER — LISINOPRIL 20 MG/1
20 TABLET ORAL NIGHTLY
Qty: 30 TABLET | Refills: 5 | Status: SHIPPED | OUTPATIENT
Start: 2021-02-23 | End: 2021-08-23

## 2021-02-23 RX ORDER — METFORMIN HYDROCHLORIDE 500 MG/1
500 TABLET, EXTENDED RELEASE ORAL DAILY
Qty: 30 TABLET | Refills: 5 | Status: SHIPPED | OUTPATIENT
Start: 2021-02-23 | End: 2021-08-23

## 2021-02-23 NOTE — PROGRESS NOTES
Subjective   Ed Villalba is a 65 y.o. male.     History of Present Illness     Urinary Frequency  He continues to have anywhere from 2-12 episodes of nocturia.  He admits to intermittent hesitancy but denies denies any other difficulty voiding and there is no history of any urgency, dysuria, or hematuria.  He has been tried on both anticholinergic medications and mirabegron with no consistent benefit.  He was previously followed by urology.    Diabetes  Current symptoms include polyuria.  He continues to deny any paresthesias of the feet, visual disturbances, polydipsia, hypoglycemia or foot ulcerations. Evaluation to date has been: hemoglobin A1C. Home sugars: continues to check once every day or two and generally remains at goal. Current treatments - metformin and sitagliptin.  He has not received the latter through his pharmacy for several months.  He is scheduled to undergo a diabetic eye exam next month     Dyslipidemia  Compliance with treatment remains excellent. The patient exercises daily. He remains on atorvastatin with no apparent side effects.     Hypertension  Home blood pressure readings: are consistently at goal. Associated signs and symptoms: none.  He continues to deny any chest pain, palpitations, dyspnea, orthopnea, paroxysmal nocturnal dyspnea and peripheral edema. Current antihypertensive medications includes - lisinopril. Medication compliance: taking as prescribed.     The following portions of the patient's history were reviewed and updated as appropriate: allergies, current medications, past medical history, past social history and problem list.    Review of Systems   Constitutional: Positive for fatigue. Negative for appetite change, chills, fever and unexpected weight change.   HENT: Positive for rhinorrhea and sneezing. Negative for congestion, ear pain, postnasal drip, sinus pressure and sore throat.    Eyes: Negative for visual disturbance.   Respiratory: Negative for cough, shortness  of breath and wheezing.    Cardiovascular: Negative for chest pain, palpitations and leg swelling.   Gastrointestinal: Negative for abdominal pain, blood in stool, constipation, diarrhea, nausea and vomiting.   Endocrine: Positive for polyuria. Negative for polydipsia.   Genitourinary: Positive for difficulty urinating (intermittent hesitancy) and frequency. Negative for dysuria, hematuria and urgency.        Nocturia × 1-4 at present   Musculoskeletal: Positive for arthralgias. Negative for back pain and myalgias.   Skin: Negative for rash.   Neurological: Negative for weakness, numbness and headaches.   Psychiatric/Behavioral: Positive for sleep disturbance (due to nocturia). Negative for dysphoric mood. The patient is not nervous/anxious.      Objective   Physical Exam  Constitutional:       General: He is not in acute distress.     Appearance: Normal appearance. He is well-developed. He is not ill-appearing or diaphoretic.      Comments: Accompanied by his wife. Bright and in good spirits. No apparent distress. No pallor, jaundice, diaphoresis, or cyanosis.   HENT:      Head: Atraumatic.      Right Ear: Tympanic membrane, ear canal and external ear normal.      Left Ear: Tympanic membrane, ear canal and external ear normal.   Eyes:      Conjunctiva/sclera: Conjunctivae normal.   Neck:      Thyroid: No thyroid mass or thyromegaly.      Vascular: No carotid bruit or JVD.      Trachea: Trachea normal. No tracheal deviation.   Cardiovascular:      Rate and Rhythm: Normal rate and regular rhythm.      Heart sounds: Normal heart sounds, S1 normal and S2 normal. No murmur. No gallop. No S3 or S4 sounds.    Pulmonary:      Effort: Pulmonary effort is normal.      Breath sounds: Normal breath sounds.   Abdominal:      General: Bowel sounds are normal. There is no distension.      Palpations: Abdomen is soft. There is no hepatomegaly, splenomegaly or mass.      Tenderness: There is no abdominal tenderness. There is no  right CVA tenderness or left CVA tenderness.      Hernia: No hernia is present.   Musculoskeletal:      Right lower leg: No edema.      Left lower leg: No edema.   Lymphadenopathy:      Head:      Right side of head: No submental, submandibular, tonsillar, preauricular, posterior auricular or occipital adenopathy.      Left side of head: No submental, submandibular, tonsillar, preauricular, posterior auricular or occipital adenopathy.      Cervical: No cervical adenopathy.      Upper Body:      Right upper body: No supraclavicular adenopathy.      Left upper body: No supraclavicular adenopathy.   Skin:     General: Skin is warm and dry.      Coloration: Skin is not cyanotic, jaundiced or pale.      Findings: No rash.      Nails: There is no clubbing.     Neurological:      Mental Status: He is alert and oriented to person, place, and time.      Cranial Nerves: No cranial nerve deficit.      Sensory: Sensory deficit (decreased vibration sense toes of both feet) present.      Motor: No tremor.      Coordination: Coordination normal.      Gait: Gait normal.   Psychiatric:         Attention and Perception: Attention normal.         Mood and Affect: Mood normal.         Speech: Speech normal.         Behavior: Behavior normal.       Assessment/Plan   Problems Addressed this Visit        Allergies and Adverse Reactions    Chronic seasonal allergic rhinitis     Relevant Medications    loratadine (CLARITIN) 10 MG tablet           Cardiac and Vasculature    Essential hypertension   Hypertension: at goal. Evidence of target organ damage: none.  Encouraged to continue to work on diet and exercise plan.   Continue current medication    Relevant Medications    aspirin (Aspirin Low Dose) 81 MG EC tablet    lisinopril (PRINIVIL,ZESTRIL) 20 MG tablet    Other Relevant Orders    CBC & Differential (Completed)    Comprehensive Metabolic Panel (Completed)    CBC Auto Differential (Completed)    Mixed hyperlipidemia  As above.    Continue current medication.    Relevant Medications    atorvastatin (LIPITOR) 20 MG tablet    Other Relevant Orders    Comprehensive Metabolic Panel (Completed)    Lipid Panel (Completed)       Endocrine and Metabolic    Type 2 diabetes mellitus with diabetic neuropathy, without long-term current use of insulin (CMS/[facility])  Diabetes mellitus Type II, under unknown control.   Encouraged to continue to pursue ADA diet  Encouraged aerobic exercise.  Continue current medication  Reminded to follow-up with scheduled ALLEGRA  Updated labs drawn.    Relevant Medications    SITagliptin (Januvia) 100 MG tablet    metFORMIN ER (GLUCOPHAGE-XR) 500 MG 24 hr tablet    Other Relevant Orders    Hemoglobin A1c (Completed)    MicroAlbumin, Urine, Random - Urine, Clean Catch (Completed)       Gastrointestinal Abdominal     Gastro-esophageal reflux disease without esophagitis    Relevant Medications    pantoprazole (PROTONIX) 40 MG EC tablet       Genitourinary and Reproductive     Urinary frequency  Continue current medication  Recommended a urology reassessment.  Patient will consider.  He will let us know if any worse or if any new symptoms in the meantime    Relevant Medications    Mirabegron ER (Myrbetriq) 50 MG tablet sustained-release 24 hour 24 hr tablet    Other Relevant Orders    POC Urinalysis Dipstick, Automated       Health Encounters    Healthcare maintenance  Encouraged to obtain a COVID-19 immunization when available.  Lengthy discussion regarding the potential benefits and risks  We will discuss hepatitis A #2 and Shingrix again at his return.  We will also discuss colon cancer screening again at that point    Relevant Medications    cholecalciferol (VITAMIN D3) 25 MCG (1000 UT) tablet       Skin    Seborrheic dermatitis       Tobacco    Dips tobacco      Diagnoses       Codes Comments    Chronic seasonal allergic rhinitis    -  Primary ICD-10-CM: J30.2  ICD-9-CM: 477.8     Mixed hyperlipidemia     ICD-10-CM:  E78.2  ICD-9-CM: 272.2     Essential hypertension     ICD-10-CM: I10  ICD-9-CM: 401.9     Type 2 diabetes mellitus with diabetic neuropathy, without long-term current use of insulin (CMS/[facility])     ICD-10-CM: E11.40  ICD-9-CM: 250.60, 357.2     Gastro-esophageal reflux disease without esophagitis     ICD-10-CM: K21.9  ICD-9-CM: 530.81     Urinary frequency     ICD-10-CM: R35.0  ICD-9-CM: 788.41     Healthcare maintenance     ICD-10-CM: Z00.00  ICD-9-CM: V70.0     Dips tobacco     ICD-10-CM: Z72.0  ICD-9-CM: 305.1     Seborrheic dermatitis     ICD-10-CM: L21.9  ICD-9-CM: 690.10

## 2021-02-24 VITALS
SYSTOLIC BLOOD PRESSURE: 124 MMHG | BODY MASS INDEX: 28.06 KG/M2 | WEIGHT: 196 LBS | TEMPERATURE: 97.1 F | HEART RATE: 69 BPM | RESPIRATION RATE: 14 BRPM | OXYGEN SATURATION: 97 % | HEIGHT: 70 IN | DIASTOLIC BLOOD PRESSURE: 64 MMHG

## 2021-02-24 LAB
ALBUMIN SERPL-MCNC: 4.1 G/DL (ref 3.5–5.2)
ALBUMIN UR-MCNC: 7.3 MG/DL
ALBUMIN/GLOB SERPL: 1.4 G/DL
ALP SERPL-CCNC: 56 U/L (ref 39–117)
ALT SERPL W P-5'-P-CCNC: 24 U/L (ref 1–41)
ANION GAP SERPL CALCULATED.3IONS-SCNC: 11 MMOL/L (ref 5–15)
AST SERPL-CCNC: 19 U/L (ref 1–40)
BASOPHILS # BLD AUTO: 0.06 10*3/MM3 (ref 0–0.2)
BASOPHILS NFR BLD AUTO: 0.8 % (ref 0–1.5)
BILIRUB SERPL-MCNC: 0.4 MG/DL (ref 0–1.2)
BUN SERPL-MCNC: 16 MG/DL (ref 8–23)
BUN/CREAT SERPL: 19.8 (ref 7–25)
CALCIUM SPEC-SCNC: 9.4 MG/DL (ref 8.6–10.5)
CHLORIDE SERPL-SCNC: 105 MMOL/L (ref 98–107)
CHOLEST SERPL-MCNC: 92 MG/DL (ref 0–200)
CO2 SERPL-SCNC: 23 MMOL/L (ref 22–29)
CREAT SERPL-MCNC: 0.81 MG/DL (ref 0.76–1.27)
DEPRECATED RDW RBC AUTO: 41.8 FL (ref 37–54)
EOSINOPHIL # BLD AUTO: 0.21 10*3/MM3 (ref 0–0.4)
EOSINOPHIL NFR BLD AUTO: 2.9 % (ref 0.3–6.2)
ERYTHROCYTE [DISTWIDTH] IN BLOOD BY AUTOMATED COUNT: 13 % (ref 12.3–15.4)
GFR SERPL CREATININE-BSD FRML MDRD: 96 ML/MIN/1.73
GLOBULIN UR ELPH-MCNC: 2.9 GM/DL
GLUCOSE SERPL-MCNC: 149 MG/DL (ref 65–99)
HBA1C MFR BLD: 8 % (ref 4.8–5.6)
HCT VFR BLD AUTO: 44.3 % (ref 37.5–51)
HDLC SERPL-MCNC: 41 MG/DL (ref 40–60)
HGB BLD-MCNC: 15.8 G/DL (ref 13–17.7)
IMM GRANULOCYTES # BLD AUTO: 0.02 10*3/MM3 (ref 0–0.05)
IMM GRANULOCYTES NFR BLD AUTO: 0.3 % (ref 0–0.5)
LDLC SERPL CALC-MCNC: 33 MG/DL (ref 0–100)
LDLC/HDLC SERPL: 0.81 {RATIO}
LYMPHOCYTES # BLD AUTO: 2.4 10*3/MM3 (ref 0.7–3.1)
LYMPHOCYTES NFR BLD AUTO: 32.8 % (ref 19.6–45.3)
MCH RBC QN AUTO: 31.8 PG (ref 26.6–33)
MCHC RBC AUTO-ENTMCNC: 35.7 G/DL (ref 31.5–35.7)
MCV RBC AUTO: 89.1 FL (ref 79–97)
MONOCYTES # BLD AUTO: 0.58 10*3/MM3 (ref 0.1–0.9)
MONOCYTES NFR BLD AUTO: 7.9 % (ref 5–12)
NEUTROPHILS NFR BLD AUTO: 4.04 10*3/MM3 (ref 1.7–7)
NEUTROPHILS NFR BLD AUTO: 55.3 % (ref 42.7–76)
NRBC BLD AUTO-RTO: 0 /100 WBC (ref 0–0.2)
PLATELET # BLD AUTO: 197 10*3/MM3 (ref 140–450)
PMV BLD AUTO: 10.3 FL (ref 6–12)
POTASSIUM SERPL-SCNC: 4.4 MMOL/L (ref 3.5–5.2)
PROT SERPL-MCNC: 7 G/DL (ref 6–8.5)
RBC # BLD AUTO: 4.97 10*6/MM3 (ref 4.14–5.8)
SODIUM SERPL-SCNC: 139 MMOL/L (ref 136–145)
TRIGL SERPL-MCNC: 89 MG/DL (ref 0–150)
VLDLC SERPL-MCNC: 18 MG/DL (ref 5–40)
WBC # BLD AUTO: 7.31 10*3/MM3 (ref 3.4–10.8)

## 2021-03-04 ENCOUNTER — IMMUNIZATION (OUTPATIENT)
Dept: VACCINE CLINIC | Facility: HOSPITAL | Age: 66
End: 2021-03-04

## 2021-03-04 PROCEDURE — 91300 HC SARSCOV02 VAC 30MCG/0.3ML IM: CPT | Performed by: INTERNAL MEDICINE

## 2021-03-04 PROCEDURE — 0001A: CPT | Performed by: INTERNAL MEDICINE

## 2021-03-11 NOTE — TELEPHONE ENCOUNTER
PTS WIFE CALLED REQUESTING A REFILL FOR:   ONE TOUCH ULTRA TEST test strip     [facility] [address], [place] - [address] [address] [place] AT [facility] [address] - [phone]  - [phone] FX

## 2021-03-25 ENCOUNTER — IMMUNIZATION (OUTPATIENT)
Dept: VACCINE CLINIC | Facility: HOSPITAL | Age: 66
End: 2021-03-25

## 2021-03-25 PROCEDURE — 91300 HC SARSCOV02 VAC 30MCG/0.3ML IM: CPT | Performed by: INTERNAL MEDICINE

## 2021-03-25 PROCEDURE — 0002A: CPT | Performed by: INTERNAL MEDICINE

## 2021-04-07 ENCOUNTER — TELEPHONE (OUTPATIENT)
Dept: FAMILY MEDICINE CLINIC | Facility: CLINIC | Age: 66
End: 2021-04-07

## 2021-04-07 DIAGNOSIS — E11.40 TYPE 2 DIABETES MELLITUS WITH DIABETIC NEUROPATHY, WITHOUT LONG-TERM CURRENT USE OF INSULIN (HCC): Primary | ICD-10-CM

## 2021-04-07 RX ORDER — LANCETS 30 GAUGE
EACH MISCELLANEOUS
Qty: 30 EACH | Refills: 5 | Status: SHIPPED | OUTPATIENT
Start: 2021-04-07 | End: 2022-08-09 | Stop reason: SDUPTHER

## 2021-04-07 RX ORDER — GLUCOSAMINE HCL/CHONDROITIN SU 500-400 MG
CAPSULE ORAL
Qty: 30 EACH | Refills: 5 | Status: SHIPPED | OUTPATIENT
Start: 2021-04-07 | End: 2022-05-19

## 2021-04-07 NOTE — TELEPHONE ENCOUNTER
Pharmacy Name: [facility] [address] [address] [address] [place] [facility] [place] [address]  - [phone] [facility] [phone] FX<br>[id number] - [place], [place] - [address] AT [address] - [phone] [facility] [phone] FX     Pharmacy representative name: GRANT    Pharmacy representative phone number: [phone]    What medication are you calling in regards to: glucose blood test strip    What question does the pharmacy have: NEED A DIAGNOSIS CODE FOR MEDICARE FOR THIS PRESCRIPTION    Who is the provider that prescribed the medication: ADAM GUPTA    Additional notes:

## 2021-04-19 DIAGNOSIS — K21.9 GASTRO-ESOPHAGEAL REFLUX DISEASE WITHOUT ESOPHAGITIS: ICD-10-CM

## 2021-04-19 RX ORDER — PANTOPRAZOLE SODIUM 40 MG/1
40 TABLET, DELAYED RELEASE ORAL DAILY
Qty: 30 TABLET | Refills: 5 | Status: SHIPPED | OUTPATIENT
Start: 2021-04-19 | End: 2021-10-20

## 2021-05-25 ENCOUNTER — OFFICE VISIT (OUTPATIENT)
Dept: FAMILY MEDICINE CLINIC | Facility: CLINIC | Age: 66
End: 2021-05-25

## 2021-05-25 DIAGNOSIS — K21.9 GASTRO-ESOPHAGEAL REFLUX DISEASE WITHOUT ESOPHAGITIS: ICD-10-CM

## 2021-05-25 DIAGNOSIS — R35.0 URINARY FREQUENCY: ICD-10-CM

## 2021-05-25 DIAGNOSIS — E78.2 MIXED HYPERLIPIDEMIA: ICD-10-CM

## 2021-05-25 DIAGNOSIS — I10 ESSENTIAL HYPERTENSION: ICD-10-CM

## 2021-05-25 DIAGNOSIS — J30.2 CHRONIC SEASONAL ALLERGIC RHINITIS: Primary | ICD-10-CM

## 2021-05-25 DIAGNOSIS — Z72.0 DIPS TOBACCO: ICD-10-CM

## 2021-05-25 DIAGNOSIS — Z00.00 HEALTHCARE MAINTENANCE: ICD-10-CM

## 2021-05-25 DIAGNOSIS — E11.40 TYPE 2 DIABETES MELLITUS WITH DIABETIC NEUROPATHY, WITHOUT LONG-TERM CURRENT USE OF INSULIN (HCC): ICD-10-CM

## 2021-05-25 PROCEDURE — 99214 OFFICE O/P EST MOD 30 MIN: CPT | Performed by: GENERAL PRACTICE

## 2021-05-25 NOTE — PROGRESS NOTES
Subjective   Ed Villalba is a 66 y.o. male.     History of Present Illness     Urinary Frequency  He has noted a significant improvement in his nocturia since last here.  He continues to have occasional hesitancy but continues to deny any other difficulty voiding and there is no history of any urgency, dysuria, or hematuria.  He has been tried on both anticholinergic medications and mirabegron with no consistent benefit.  He was previously followed by urology.    Diabetes  Current symptoms include polyuria.  He continues to deny any paresthesias of the feet, visual disturbances, polydipsia, hypoglycemia or foot ulcerations. Evaluation to date has been: hemoglobin A1C. Home sugars: have been somewhat labile since last here. Current treatments - metformin and sitagliptin.  He has been off the latter for some time prior to his most recent labs.  He underwent an updated diabetic eye exam on 3/31/2021  Lab Results   Component Value Date    HGBA1C 8.00 (H) 02/23/2021     Lab Results   Component Value Date    MICROALBUR 7.3 02/23/2021      Dyslipidemia  Compliance with treatment remains excellent. The patient exercises daily. He remains on atorvastatin with no apparent side effects.   Lab Results   Component Value Date    CHOL 92 02/23/2021    CHLPL 88 05/02/2016    TRIG 89 02/23/2021    HDL 41 02/23/2021    LDL 33 02/23/2021     Hypertension  Home blood pressure readings: are consistently at goal. Associated signs and symptoms: none.  He continues to deny any chest pain, palpitations, dyspnea, orthopnea, paroxysmal nocturnal dyspnea and peripheral edema. Current antihypertensive medications includes - lisinopril. Medication compliance: taking as prescribed.   Lab Results   Component Value Date    GLUCOSE 149 (H) 02/23/2021    BUN 16 02/23/2021    CREATININE 0.81 02/23/2021    EGFRIFNONA 96 02/23/2021    BCR 19.8 02/23/2021    K 4.4 02/23/2021    CO2 23.0 02/23/2021    CALCIUM 9.4 02/23/2021    ALBUMIN 4.10 02/23/2021     LABIL2 1.4 (L) 05/02/2016    AST 19 02/23/2021    ALT 24 02/23/2021      Labs  Lab Results   Component Value Date    WBC 7.31 02/23/2021    HGB 15.8 02/23/2021    HCT 44.3 02/23/2021    MCV 89.1 02/23/2021     02/23/2021     The following portions of the patient's history were reviewed and updated as appropriate: allergies, current medications, past medical history, past social history and problem list.    Review of Systems   Constitutional: Positive for fatigue. Negative for appetite change, chills, fever and unexpected weight change.   HENT: Positive for rhinorrhea and sneezing. Negative for congestion, ear pain, postnasal drip, sinus pressure and sore throat.    Eyes: Negative for visual disturbance.   Respiratory: Negative for cough, shortness of breath and wheezing.    Cardiovascular: Negative for chest pain, palpitations and leg swelling.   Gastrointestinal: Negative for abdominal pain, blood in stool, constipation, diarrhea, nausea and vomiting.   Endocrine: Positive for polyuria. Negative for polydipsia.   Genitourinary: Positive for difficulty urinating (intermittent hesitancy) and frequency. Negative for dysuria, hematuria and urgency.        Nocturia × 1-4 at present   Musculoskeletal: Positive for arthralgias. Negative for back pain and myalgias.   Skin: Negative for rash.   Neurological: Negative for weakness, numbness and headaches.   Psychiatric/Behavioral: Positive for sleep disturbance (due to nocturia). Negative for dysphoric mood. The patient is not nervous/anxious.      Objective   Physical Exam  Constitutional:       General: He is not in acute distress.     Appearance: Normal appearance. He is well-developed. He is not ill-appearing or diaphoretic.      Comments: Accompanied by his wife. Bright and in good spirits. No apparent distress. No pallor, jaundice, diaphoresis, or cyanosis.   HENT:      Head: Atraumatic.      Right Ear: Tympanic membrane, ear canal and external ear normal.       Left Ear: Tympanic membrane, ear canal and external ear normal.   Eyes:      Conjunctiva/sclera: Conjunctivae normal.   Neck:      Thyroid: No thyroid mass or thyromegaly.      Vascular: No carotid bruit or JVD.      Trachea: Trachea normal. No tracheal deviation.   Cardiovascular:      Rate and Rhythm: Normal rate and regular rhythm.      Heart sounds: Normal heart sounds, S1 normal and S2 normal. No murmur heard.   No gallop. No S3 or S4 sounds.    Pulmonary:      Effort: Pulmonary effort is normal.      Breath sounds: Normal breath sounds.   Abdominal:      General: Bowel sounds are normal. There is no distension.   Musculoskeletal:      Right lower leg: No edema.      Left lower leg: No edema.   Lymphadenopathy:      Head:      Right side of head: No submental, submandibular, tonsillar, preauricular, posterior auricular or occipital adenopathy.      Left side of head: No submental, submandibular, tonsillar, preauricular, posterior auricular or occipital adenopathy.      Cervical: No cervical adenopathy.      Upper Body:      Right upper body: No supraclavicular adenopathy.      Left upper body: No supraclavicular adenopathy.   Skin:     General: Skin is warm and dry.      Coloration: Skin is not cyanotic, jaundiced or pale.      Findings: No rash.      Nails: There is no clubbing.   Neurological:      Mental Status: He is alert and oriented to person, place, and time.      Cranial Nerves: No cranial nerve deficit.      Sensory: Sensory deficit (decreased vibration sense toes of both feet) present.      Motor: No tremor.      Coordination: Coordination normal.      Gait: Gait normal.   Psychiatric:         Attention and Perception: Attention normal.         Mood and Affect: Mood normal.         Speech: Speech normal.         Behavior: Behavior normal.         Thought Content: Thought content normal.       Assessment/Plan   Problems Addressed this Visit        Allergies and Adverse Reactions    Chronic seasonal  allergic rhinitis       Cardiac and Vasculature    Essential hypertension   Hypertension: at goal. Evidence of target organ damage: none.  Encouraged to continue to work on diet and exercise plan.   Continue current medication    Mixed hyperlipidemia  As above.   Continue current medication.       Endocrine and Metabolic    Type 2 diabetes mellitus with diabetic neuropathy, without long-term current use of insulin (CMS/[facility])  Diabetes mellitus Type II, under fair control.   Encouraged to continue to pursue ADA diet  Encouraged aerobic exercise.  Continue current medication  Prescription will be written for a new glucometer  Updated labs will be drawn at his return       Gastrointestinal Abdominal     Gastro-esophageal reflux disease without esophagitis       Genitourinary and Reproductive     Urinary frequency  Patient remains uninterested in a urology reassessment  Encouraged to report if any worse or if any new symptoms or concerns.       Health Encounters    Healthcare maintenance  Patient has received both doses of the COVID-19 vaccine.  He remains uninterested in undergoing colon cancer screening       Tobacco    Dips tobacco      Diagnoses       Codes Comments    Chronic seasonal allergic rhinitis    -  Primary ICD-10-CM: J30.2  ICD-9-CM: 477.8     Mixed hyperlipidemia     ICD-10-CM: E78.2  ICD-9-CM: 272.2     Essential hypertension     ICD-10-CM: I10  ICD-9-CM: 401.9     Type 2 diabetes mellitus with diabetic neuropathy, without long-term current use of insulin (CMS/[facility])     ICD-10-CM: E11.40  ICD-9-CM: 250.60, 357.2     Gastro-esophageal reflux disease without esophagitis     ICD-10-CM: K21.9  ICD-9-CM: 530.81     Urinary frequency     ICD-10-CM: R35.0  ICD-9-CM: 788.41     Healthcare maintenance     ICD-10-CM: Z00.00  ICD-9-CM: V70.0     Dips tobacco     ICD-10-CM: Z72.0  ICD-9-CM: 305.1

## 2021-05-26 VITALS
TEMPERATURE: 97.1 F | RESPIRATION RATE: 14 BRPM | HEART RATE: 91 BPM | BODY MASS INDEX: 27.06 KG/M2 | HEIGHT: 70 IN | WEIGHT: 189 LBS | DIASTOLIC BLOOD PRESSURE: 65 MMHG | SYSTOLIC BLOOD PRESSURE: 124 MMHG | OXYGEN SATURATION: 97 %

## 2021-05-26 RX ORDER — BLOOD-GLUCOSE METER
1 KIT MISCELLANEOUS ONCE
Qty: 1 EACH | Refills: 0 | Status: SHIPPED | OUTPATIENT
Start: 2021-05-26 | End: 2021-05-26

## 2021-08-22 DIAGNOSIS — J30.2 CHRONIC SEASONAL ALLERGIC RHINITIS: ICD-10-CM

## 2021-08-22 DIAGNOSIS — E11.40 TYPE 2 DIABETES MELLITUS WITH DIABETIC NEUROPATHY, WITHOUT LONG-TERM CURRENT USE OF INSULIN (HCC): ICD-10-CM

## 2021-08-22 DIAGNOSIS — I10 ESSENTIAL HYPERTENSION: ICD-10-CM

## 2021-08-23 RX ORDER — METFORMIN HYDROCHLORIDE 500 MG/1
TABLET, EXTENDED RELEASE ORAL
Qty: 90 TABLET | Refills: 5 | Status: SHIPPED | OUTPATIENT
Start: 2021-08-23 | End: 2022-01-04 | Stop reason: SDUPTHER

## 2021-08-23 RX ORDER — LORATADINE 10 MG/1
TABLET ORAL
Qty: 30 TABLET | Refills: 5 | Status: SHIPPED | OUTPATIENT
Start: 2021-08-23 | End: 2022-04-04 | Stop reason: SDUPTHER

## 2021-08-23 RX ORDER — LISINOPRIL 20 MG/1
TABLET ORAL
Qty: 30 TABLET | Refills: 5 | Status: SHIPPED | OUTPATIENT
Start: 2021-08-23 | End: 2022-02-28

## 2021-09-04 DIAGNOSIS — E11.40 TYPE 2 DIABETES MELLITUS WITH DIABETIC NEUROPATHY, WITHOUT LONG-TERM CURRENT USE OF INSULIN (HCC): ICD-10-CM

## 2021-09-07 RX ORDER — SITAGLIPTIN 100 MG/1
TABLET, FILM COATED ORAL
Qty: 30 TABLET | Refills: 5 | Status: SHIPPED | OUTPATIENT
Start: 2021-09-07 | End: 2022-03-07

## 2021-10-01 ENCOUNTER — OFFICE VISIT (OUTPATIENT)
Dept: FAMILY MEDICINE CLINIC | Facility: CLINIC | Age: 66
End: 2021-10-01

## 2021-10-01 DIAGNOSIS — Z72.0 DIPS TOBACCO: ICD-10-CM

## 2021-10-01 DIAGNOSIS — K21.9 GASTRO-ESOPHAGEAL REFLUX DISEASE WITHOUT ESOPHAGITIS: ICD-10-CM

## 2021-10-01 DIAGNOSIS — R35.0 URINARY FREQUENCY: ICD-10-CM

## 2021-10-01 DIAGNOSIS — Z00.00 HEALTHCARE MAINTENANCE: ICD-10-CM

## 2021-10-01 DIAGNOSIS — I10 ESSENTIAL HYPERTENSION: ICD-10-CM

## 2021-10-01 DIAGNOSIS — E11.40 TYPE 2 DIABETES MELLITUS WITH DIABETIC NEUROPATHY, WITHOUT LONG-TERM CURRENT USE OF INSULIN (HCC): ICD-10-CM

## 2021-10-01 DIAGNOSIS — E78.2 MIXED HYPERLIPIDEMIA: ICD-10-CM

## 2021-10-01 DIAGNOSIS — J30.2 CHRONIC SEASONAL ALLERGIC RHINITIS: Primary | ICD-10-CM

## 2021-10-01 PROCEDURE — 85025 COMPLETE CBC W/AUTO DIFF WBC: CPT | Performed by: GENERAL PRACTICE

## 2021-10-01 PROCEDURE — G0439 PPPS, SUBSEQ VISIT: HCPCS | Performed by: GENERAL PRACTICE

## 2021-10-01 PROCEDURE — 80061 LIPID PANEL: CPT | Performed by: GENERAL PRACTICE

## 2021-10-01 PROCEDURE — 84443 ASSAY THYROID STIM HORMONE: CPT | Performed by: GENERAL PRACTICE

## 2021-10-01 PROCEDURE — 36415 COLL VENOUS BLD VENIPUNCTURE: CPT | Performed by: GENERAL PRACTICE

## 2021-10-01 PROCEDURE — 82607 VITAMIN B-12: CPT | Performed by: GENERAL PRACTICE

## 2021-10-01 PROCEDURE — 83036 HEMOGLOBIN GLYCOSYLATED A1C: CPT | Performed by: GENERAL PRACTICE

## 2021-10-01 PROCEDURE — 85007 BL SMEAR W/DIFF WBC COUNT: CPT | Performed by: GENERAL PRACTICE

## 2021-10-01 PROCEDURE — 80053 COMPREHEN METABOLIC PANEL: CPT | Performed by: GENERAL PRACTICE

## 2021-10-01 NOTE — PROGRESS NOTES
The ABCs of the Annual Wellness Visit  Subsequent Medicare Wellness Visit    Chief Complaint on file.  Subsequent Medicare Wellness Visit    Subjective    History of Present Illness:  Ed Villalba is a 66 y.o. male who presents for a Subsequent Medicare Wellness Visit.    Urinary Frequency  He has noted a persistent improvement in his nocturia since last here.  He continues to have occasional hesitancy but continues to deny any other difficulty voiding and there is no history of any urgency, dysuria, or hematuria.  He has been tried on both anticholinergic medications and mirabegron with no consistent benefit.  He was previously followed by urology.    Diabetes  Current symptoms include polyuria.  He continues to deny any paresthesias of the feet, visual disturbances, polydipsia, hypoglycemia or foot ulcerations. Evaluation to date has been: hemoglobin A1C. Home sugars: have remained somewhat labile since last here. Current treatments - metformin and sitagliptin. He underwent an updated diabetic eye exam on 3/31/2021.  He has had no recent labs     Dyslipidemia  Compliance with treatment remains excellent. The patient exercises daily. He remains on atorvastatin with no apparent side effects.     Hypertension  Home blood pressure readings: are consistently at goal. Associated signs and symptoms: none.  He continues to deny any chest pain, palpitations, dyspnea, orthopnea, paroxysmal nocturnal dyspnea and peripheral edema. Current antihypertensive medications includes - lisinopril. Medication compliance: taking as prescribed.     The following portions of the patient's history were reviewed and   updated as appropriate: allergies, current medications, past family history, past medical history, past social history, past surgical history and problem list.    Compared to one year ago, the patient feels his physical   health is the same.    Compared to one year ago, the patient feels his mental   health is the  same.    Recent Hospitalizations:  He was not admitted to the hospital during the last year.     Current Medical Providers:  Patient Care Team:  Dwight Borges MD as PCP - General (Family Medicine)    Outpatient Medications Prior to Visit   Medication Sig Dispense Refill   • aspirin (Aspirin Low Dose) 81 MG EC tablet Take 1 tablet by mouth Daily. 30 tablet 5   • atorvastatin (LIPITOR) 20 MG tablet Take 1 tablet by mouth Every Night. 30 tablet 5   • cholecalciferol (VITAMIN D3) 25 MCG (1000 UT) tablet Take 1 tablet by mouth Daily. 30 tablet 5   • Glucose Blood (Blood Glucose Test) strip 1 daily 30 each 5   • Januvia 100 MG tablet TAKE ONE TABLET BY MOUTH DAILY 30 tablet 5   • Lancets misc 1 daily 30 each 5   • lisinopril (PRINIVIL,ZESTRIL) 20 MG tablet TAKE ONE TABLET BY MOUTH ONCE NIGHTLY 30 tablet 5   • loratadine (CLARITIN) 10 MG tablet TAKE ONE TABLET BY MOUTH DAILY 30 tablet 5   • meloxicam (MOBIC) 15 MG tablet Take 1 tablet by mouth Daily. 30 tablet 5   • metFORMIN ER (GLUCOPHAGE-XR) 500 MG 24 hr tablet TAKE ONE TABLET BY MOUTH DAILY 90 tablet 5   • Mirabegron ER (Myrbetriq) 50 MG tablet sustained-release 24 hour 24 hr tablet Take 50 mg by mouth Daily. 30 tablet 5   • pantoprazole (PROTONIX) 40 MG EC tablet TAKE 1 TABLET BY MOUTH DAILY 30 tablet 5   • triamcinolone (KENALOG) 0.1 % lotion APPLY EXTERNALLY TO THE AFFECTED AREA TWICE DAILY 120 mL 5   • Zoster Vac Recomb Adjuvanted (Shingrix) 50 MCG/0.5ML reconstituted suspension Inject 0.5 mL into the appropriate muscle as directed by prescriber See Admin Instructions. Repeat in 2-6 months 1 each 1     No facility-administered medications prior to visit.     No opioid medication identified on active medication list. I have reviewed chart for other potential  high risk medication/s and harmful drug interactions in the elderly.          Aspirin is on active medication list. Aspirin use is indicated based on review of current medical condition/s. Pros and  cons of this therapy have been discussed today. Benefits of this medication outweigh potential harm.  Patient has been encouraged to continue taking this medication.  .    Patient Active Problem List   Diagnosis   • Dips tobacco   • Essential hypertension   • Mixed hyperlipidemia   • Type 2 diabetes mellitus with diabetic neuropathy, without long-term current use of insulin (HCC)   • Urinary frequency   • Chronic seasonal allergic rhinitis   • Gastro-esophageal reflux disease without esophagitis   • Seborrheic dermatitis   • Healthcare maintenance   • Encounter for immunization     Advance Care Planning  Advance Directive is not on file.  ACP discussion was held with the patient during this visit. Patient does not have an advance directive, information provided.    Review of Systems   Constitutional: Positive for fatigue. Negative for appetite change, chills and fever.   HENT: Positive for rhinorrhea and sneezing. Negative for congestion, ear pain, postnasal drip, sore throat and voice change.    Eyes: Negative for visual disturbance.   Respiratory: Negative for cough, shortness of breath and wheezing.    Cardiovascular: Negative for chest pain, palpitations and leg swelling.   Gastrointestinal: Negative for abdominal pain, blood in stool, constipation, diarrhea, nausea and vomiting.   Endocrine: Positive for polyuria. Negative for polydipsia.   Genitourinary: Positive for difficulty urinating (intermittent hesitancy) and frequency (with nocturia x 1-4). Negative for dysuria, hematuria and urgency.   Musculoskeletal: Positive for arthralgias. Negative for back pain, joint swelling, myalgias and neck pain.   Skin: Negative for rash.   Neurological: Negative for weakness and numbness.   Psychiatric/Behavioral: Negative for dysphoric mood and sleep disturbance. The patient is not nervous/anxious.         Objective    Vitals:    10/01/21 1356   BP: 118/60   Pulse: 78   Resp: 14   Temp: 97.5 °F (36.4 °C)   TempSrc:  "Temporal   SpO2: 96%   Weight: 88 kg (194 lb)   Height: 177.8 cm (70\")     BMI Readings from Last 1 Encounters:   10/01/21 27.84 kg/m²   BMI is above normal parameters. Recommendations include: exercise counseling and nutrition counseling    Does the patient have evidence of cognitive impairment? No    Physical Exam  Constitutional:       General: He is not in acute distress.     Appearance: Normal appearance. He is well-developed. He is not ill-appearing or diaphoretic.      Comments: Accompanied by his wife. Bright and in good spirits. No apparent distress. No pallor, jaundice, diaphoresis, or cyanosis.   HENT:      Head: Atraumatic.      Right Ear: Tympanic membrane, ear canal and external ear normal.      Left Ear: Tympanic membrane, ear canal and external ear normal.   Eyes:      Conjunctiva/sclera: Conjunctivae normal.   Neck:      Thyroid: No thyroid mass or thyromegaly.      Vascular: No carotid bruit or JVD.      Trachea: Trachea normal. No tracheal deviation.   Cardiovascular:      Rate and Rhythm: Normal rate and regular rhythm.      Heart sounds: Normal heart sounds, S1 normal and S2 normal. No murmur heard.   No gallop. No S3 or S4 sounds.    Pulmonary:      Effort: Pulmonary effort is normal.      Breath sounds: Normal breath sounds.   Abdominal:      General: Bowel sounds are normal. There is no distension.      Palpations: Abdomen is soft. There is no hepatomegaly, splenomegaly or mass.      Tenderness: There is no abdominal tenderness.      Hernia: No hernia is present.   Musculoskeletal:      Right lower leg: No edema.      Left lower leg: No edema.   Lymphadenopathy:      Head:      Right side of head: No submental, submandibular, tonsillar, preauricular, posterior auricular or occipital adenopathy.      Left side of head: No submental, submandibular, tonsillar, preauricular, posterior auricular or occipital adenopathy.      Cervical: No cervical adenopathy.      Upper Body:      Right upper body: " No supraclavicular adenopathy.      Left upper body: No supraclavicular adenopathy.   Skin:     General: Skin is warm and dry.      Coloration: Skin is not cyanotic, jaundiced or pale.      Findings: No rash.      Nails: There is no clubbing.   Neurological:      Mental Status: He is alert and oriented to person, place, and time.      Cranial Nerves: No cranial nerve deficit.      Sensory: Sensory deficit (decreased vibration sense toes of both feet) present.      Motor: No tremor.      Coordination: Coordination normal.      Gait: Gait normal.   Psychiatric:         Attention and Perception: Attention normal.         Mood and Affect: Mood normal.         Speech: Speech normal.         Behavior: Behavior normal.         Thought Content: Thought content normal.       HEALTH RISK ASSESSMENT    Smoking Status:  Social History     Tobacco Use   Smoking Status Never Smoker   Smokeless Tobacco Current User   • Types: Snuff     Alcohol Consumption:  Social History     Substance and Sexual Activity   Alcohol Use No     Fall Risk Screen:    LLUVIA Fall Risk Assessment was completed, and patient is at LOW risk for falls.Assessment completed on:10/1/2021    Depression Screening:  PHQ-2/PHQ-9 Depression Screening 10/1/2021   Little interest or pleasure in doing things 0   Feeling down, depressed, or hopeless 0   Trouble falling or staying asleep, or sleeping too much -   Feeling tired or having little energy -   Poor appetite or overeating -   Feeling bad about yourself - or that you are a failure or have let yourself or your family down -   Trouble concentrating on things, such as reading the newspaper or watching television -   Moving or speaking so slowly that other people could have noticed. Or the opposite - being so fidgety or restless that you have been moving around a lot more than usual -   Thoughts that you would be better off dead, or of hurting yourself in some way -   Total Score 0   If you checked off any  problems, how difficult have these problems made it for you to do your work, take care of things at home, or get along with other people? -     Health Habits and Functional and Cognitive Screening:  Functional & Cognitive Status 10/1/2021   Do you have difficulty preparing food and eating? No   Do you have difficulty bathing yourself, getting dressed or grooming yourself? No   Do you have difficulty using the toilet? No   Do you have difficulty moving around from place to place? No   Do you have trouble with steps or getting out of a bed or a chair? No   Current Diet Well Balanced Diet   Dental Exam Not up to date   Eye Exam Not up to date   Exercise (times per week) 0 times per week   Current Exercises Include No Regular Exercise   Current Exercise Activities Include -   Do you need help using the phone?  No   Are you deaf or do you have serious difficulty hearing?  No   Do you need help with transportation? No   Do you need help shopping? No   Do you need help preparing meals?  No   Do you need help with housework?  No   Do you need help with laundry? No   Do you need help taking your medications? No   Do you need help managing money? No   Do you ever drive or ride in a car without wearing a seat belt? No   Have you felt unusual stress, anger or loneliness in the last month? No   Who do you live with? Spouse   If you need help, do you have trouble finding someone available to you? No   Have you been bothered in the last four weeks by sexual problems? No   Do you have difficulty concentrating, remembering or making decisions? No     Age-appropriate Screening Schedule:  Refer to the list below for future screening recommendations based on patient's age, sex and/or medical conditions. Orders for these recommended tests are listed in the plan section. The patient has been provided with a written plan.    Health Maintenance   Topic Date Due   • DXA SCAN  Never done   • ZOSTER VACCINE (1 of 2) Never done   • DIABETIC  FOOT EXAM  01/07/2020   • INFLUENZA VACCINE  10/01/2021   • URINE MICROALBUMIN  02/23/2022   • DIABETIC EYE EXAM  03/31/2022   • HEMOGLOBIN A1C  04/01/2022   • LIPID PANEL  10/01/2022   • TDAP/TD VACCINES (3 - Td or Tdap) 07/23/2030        Assessment/Plan   CMS Preventative Services Quick Reference  Risk Factors Identified During Encounter  Immunizations Discussed/Encouraged (specific Immunizations; Influenza, Shingrix and COVID19  Obesity/Overweight   Tobacco Use/Dependance (use dotphrase .tobaccocessation for documentation)  The above risks/problems have been discussed with the patient.  Follow up actions/plans if indicated are seen below in the Assessment/Plan Section.  Pertinent information has been shared with the patient in the After Visit Summary.    Diagnoses and all orders for this visit:    1. Chronic seasonal allergic rhinitis     2. Essential hypertension   Hypertension: at goal. Evidence of target organ damage: none.  Encouraged to continue to work on diet and exercise plan.   Continue current medication  -     CBC & Differential  -     Comprehensive Metabolic Panel  -     Manual Differential    3. Mixed hyperlipidemia  As above.   Continue current medication.  -     Comprehensive Metabolic Panel  -     Lipid Panel  -     TSH    4. Type 2 diabetes mellitus with diabetic neuropathy, without long-term current use of insulin (HCC)  Diabetes mellitus Type II, under unknown control.  Diabetes related complications: peripheral neuropathy   Encouraged to continue to pursue ADA diet  Encouraged aerobic exercise.  Continue current medication  Updated labs drawn.  -     Hemoglobin A1c  -     Vitamin B12  -     MicroAlbumin, Urine, Random - Urine, Clean Catch    5. Gastro-esophageal reflux disease without esophagitis    6. Urinary frequency  Patient remains uninterested in a urology reassessment  Encouraged to report if any worse or if any new symptoms or concerns.    7. Healthcare maintenance  Recommended a third  dose of the Pfizer COVID-19 vaccine.  Patient remains uninterested in a flu shot, any other immunizations, or in any cancer screening    8. Dips tobacco  Reminded of the potential sequela of continued tobacco use and the options with respect to cessation.  Patient remains uninterested in pursuing this at present but will consider    Follow Up:   Return in about 4 months (around 1/17/2022).     An After Visit Summary and PPPS were made available to the patient.

## 2021-10-01 NOTE — PATIENT INSTRUCTIONS
Advance Directive    Advance directives are legal documents that let you make choices ahead of time about your health care and medical treatment in case you become unable to communicate for yourself. Advance directives are a way for you to make known your wishes to family, friends, and health care providers. This can let others know about your end-of-life care if you become unable to communicate.  Discussing and writing advance directives should happen over time rather than all at once. Advance directives can be changed depending on your situation and what you want, even after you have signed the advance directives.  There are different types of advance directives, such as:  · Medical power of .  · Living will.  · Do not resuscitate (DNR) or do not attempt resuscitation (DNAR) order.  Health care proxy and medical power of   A health care proxy is also called a health care agent. This is a person who is appointed to make medical decisions for you in cases where you are unable to make the decisions yourself. Generally, people choose someone they know well and trust to represent their preferences. Make sure to ask this person for an agreement to act as your proxy. A proxy may have to exercise judgment in the event of a medical decision for which your wishes are not known.  A medical power of  is a legal document that names your health care proxy. Depending on the laws in your state, after the document is written, it may also need to be:  · Signed.  · Notarized.  · Dated.  · Copied.  · Witnessed.  · Incorporated into your medical record.  You may also want to appoint someone to manage your money in a situation in which you are unable to do so. This is called a durable power of  for finances. It is a separate legal document from the durable power of  for health care. You may choose the same person or someone different from your health care proxy to act as your agent in money  matters.  If you do not appoint a proxy, or if there is a concern that the proxy is not acting in your best interests, a court may appoint a guardian to act on your behalf.  Living will  A living will is a set of instructions that state your wishes about medical care when you cannot express them yourself. Health care providers should keep a copy of your living will in your medical record. You may want to give a copy to family members or friends. To alert caregivers in case of an emergency, you can place a card in your wallet to let them know that you have a living will and where they can find it. A living will is used if you become:  · Terminally ill.  · Disabled.  · Unable to communicate or make decisions.  Items to consider in your living will include:  · To use or not to use life-support equipment, such as dialysis machines and breathing machines (ventilators).  · A DNR or DNAR order. This tells health care providers not to use cardiopulmonary resuscitation (CPR) if breathing or heartbeat stops.  · To use or not to use tube feeding.  · To be given or not to be given food and fluids.  · Comfort (palliative) care when the goal becomes comfort rather than a cure.  · Donation of organs and tissues.  A living will does not give instructions for distributing your money and property if you should pass away.  DNR or DNAR  A DNR or DNAR order is a request not to have CPR in the event that your heart stops beating or you stop breathing. If a DNR or DNAR order has not been made and shared, a health care provider will try to help any patient whose heart has stopped or who has stopped breathing. If you plan to have surgery, talk with your health care provider about how your DNR or DNAR order will be followed if problems occur.  What if I do not have an advance directive?  If you do not have an advance directive, some states assign family decision makers to act on your behalf based on how closely you are related to them. Each  state has its own laws about advance directives. You may want to check with your health care provider, , or state representative about the laws in your state.  Summary  · Advance directives are the legal documents that allow you to make choices ahead of time about your health care and medical treatment in case you become unable to tell others about your care.  · The process of discussing and writing advance directives should happen over time. You can change the advance directives, even after you have signed them.  · Advance directives include DNR or DNAR orders, living mueller, and designating an agent as your medical power of .  This information is not intended to replace advice given to you by your health care provider. Make sure you discuss any questions you have with your health care provider.  Document Revised: 01/28/2021 Document Reviewed: 07/16/2020  [employer] Patient Education © 2021 [employer]      Heart Disease Prevention  Heart disease is the leading cause of death in the world. Coronary artery disease is the most common cause of heart disease. This condition results when cholesterol and other substances (plaque) build up inside the walls of the blood vessels that supply your heart muscle (arteries). This buildup in arteries is called atherosclerosis. You can take actions to lower your risk of heart disease.  How can heart disease affect me?  Heart disease can cause many unpleasant symptoms and complications, such as:  · Chest pain (angina).  · Reduced or blocked blood flow to your heart. This can cause:  ? Irregular heartbeats (arrhythmias).  ? Heart attack.  ? Heart failure.  What can increase my risk?  The following factors may make you more likely to develop this condition:  · High blood pressure (hypertension).  · High cholesterol.  · Smoking.  · A diet high in saturated fats or trans fats.  · Lack of physical activity.  · Obesity.  · Drinking too much alcohol.  · Diabetes.  · Having  a family history of heart disease.  What actions can I take to prevent heart disease?  Nutrition    · Eat a heart-healthy eating plan as told by your health care provider. Examples include the DASH (Dietary Approaches to Stop Hypertension) eating plan or the Mediterranean diet.  · Generally, it is recommended that you:  ? Eat less salt (sodium). Ask your health care provider how much sodium is safe for you. Most people should have less than 2,300 mg each day.  ? Limit unhealthy fats, such as saturated and trans fats, in your diet. You can do this by eating low-fat dairy products, eating less red meat, and avoiding processed foods.  ? Eat healthy fats (omega-3 fatty acids). These are found in fish, such as mackerel or salmon.  ? Eat more fruits and vegetables. You should try to fill one-half of your plate with fruits and vegetables at each meal.  ? Eat more whole grains.  ? Avoid foods and drinks that have added sugars.    Lifestyle    · Get regular exercise. This is one of the most important things you can do for your health. Generally, it is recommended that you:  ? Exercise for at least 30 minutes on most days of the week (150 minutes each week). The exercise should increase your heart rate and make you sweat (aerobic exercise).  ? Add strength exercises on at least 2 days each week.  · Do not use any products that contain nicotine or tobacco, such as cigarettes and e-cigarettes. These can damage your heart and blood vessels. If you need help quitting, ask your health care provider.    Alcohol use  · Do not drink alcohol if:  ? Your health care provider tells you not to drink.  ? You are pregnant, may be pregnant, or are planning to become pregnant.  · If you drink alcohol, limit how much you have:  ? 0-1 drink a day for women.  ? 0-2 drinks a day for men.  · Be aware of how much alcohol is in your drink. In the [place] one drink equals one typical bottle of beer (12 oz), one-half glass of wine (5 oz), or one shot  of hard liquor (1½ oz).  Medicines  · Take over-the-counter and prescription medicines only as told by your health care provider.  · Ask your health care provider whether you should take an aspirin every day. Taking aspirin may help reduce your risk of heart disease and stroke.  · Depending on your risk factors, your health care provider may prescribe medicines to lower your risk of heart disease or to control related conditions. You may take medicine to:  ? Lower cholesterol.  ? Control blood pressure.  ? Control diabetes.  General information  · Keep your blood pressure under control, as recommended by your health care provider. For most healthy people, the upper number of your blood pressure (systolic) should be no higher than 120, and the lower number (diastolic) no higher than 80. Treatment may be needed if your blood pressure is higher than 130/80.  · Have your blood pressure checked at least every two years. Your health care provider may check your blood pressure more often if you have high blood pressure.  · After age 20, have your cholesterol checked every 4-6 years. If you have risk factors for heart disease, you may need to have it checked more frequently. Treatment may be needed if your cholesterol is high.  · Have your body mass index (BMI) checked every year. Your health care provider can calculate your BMI from your height and weight.  · Work with your health care provider to lose weight, if needed, or to maintain a healthy weight.  Where to find more information:  · Centers for Disease Control and Prevention: www.cdc.gov/heartdisease  · American Heart Association: www.heart.org  ? Take a free online heart disease risk quiz to better understand your personal risk factors.  Summary  · Heart disease is the leading cause of death in the world.  · Heart disease can cause chest pain, abnormal heart rhythms, heart attack, and heart failure.  · High blood pressure, high cholesterol, and smoking are the main  "risk factors for heart disease, although other factors also contribute.  · You can take actions to lower your chances of developing heart disease. Work with your health care provider to reduce your risk by following a heart-healthy diet, being physically active, and controlling your weight, blood pressure, and cholesterol level.  This information is not intended to replace advice given to you by your health care provider. Make sure you discuss any questions you have with your health care provider.  Document Revised: 01/02/2019 Document Reviewed: 01/02/2019  [employer] Patient Education © 2021 [employer]      https://www.cancer.org/cancer/colon-rectal-cancer/causes-risks-prevention/risk-factors.html\">   Colorectal Cancer Screening    Colorectal cancer screening is a group of tests that are used to check for colorectal cancer before symptoms develop. Colorectal refers to the colon and rectum. The colon and rectum are located at the end of the digestive tract and carry stool (feces) out of the body.  Who should have screening?  All adults who are 45-75 years old should have screening. Your health care provider may recommend screening before age 45. You will have tests every 1-10 years, depending on your results and the type of screening test. Screening recommendations for adults who are 76-85 years old vary depending on a person's health. People older than age 85 should no longer get colorectal cancer screening.  You may have screening tests starting before age 45, or more often than other people, if you have any of these risk factors:  · A personal or family history of colorectal cancer or abnormal growths known as polyps in your colon.  · Inflammatory bowel disease, such as ulcerative colitis or Crohn's disease.  · A history of having radiation treatment to the abdomen or the area between the hip bones (pelvic area) for cancer.  · A type of genetic syndrome that is passed from parent to child (hereditary), such " as:  ? Fajardo syndrome.  ? Familial adenomatous polyposis.  ? Turcot syndrome.  ? Peutz-Jeghers syndrome.  ? MUTYH-associated polyposis (MAP).  · A personal history of diabetes.  Types of tests  There are several types of colorectal screening tests. You may have one or more of the following:  · Guaiac-based fecal occult blood testing. For this test, a stool sample is checked for hidden (occult) blood, which could be a sign of colorectal cancer.  · Fecal immunochemical test (FIT). For this test, a stool sample is checked for blood, which could be a sign of colorectal cancer.  · Stool DNA test. For this test, a stool sample is checked for blood and changes in DNA that could lead to colorectal cancer.  · Sigmoidoscopy. During this test, a thin, flexible tube with a camera on the end, called a sigmoidoscope, is used to examine the rectum and the lower colon.  · Colonoscopy. During this test, a long, flexible tube with a camera on the end, called a colonoscope, is used to examine the entire colon and rectum. Also, sometimes a tissue sample is taken to be looked at under a microscope (biopsy) or small polyps are removed during this test.  · Virtual colonoscopy. Instead of a colonoscope, this type of colonoscopy uses a CT scan to take pictures of the colon and rectum. A CT scan is a type of X-ray that is made using computers.  What are the benefits of screening?  Screening reduces your risk for colorectal cancer and can help identify cancer at an early stage, when the cancer can be removed or treated more easily. It is common for polyps to form in the lining of the colon, especially as you age. These polyps may be cancerous or become cancerous over time. Screening can identify these polyps.  What are the risks of screening?  Generally, these are safe tests. However, problems may occur, including:  · The need for more tests to confirm results from a stool sample test. Stool sample tests have fewer risks than other types of  screening tests.  · Being exposed to low levels of radiation, if you had a test involving X-rays. This may slightly increase your cancer risk. The benefit of detecting cancer outweighs the slight increase in risk.  · Bleeding, damage to the intestine, or infection caused by a sigmoidoscopy or colonoscopy.  · A reaction to medicines given during a sigmoidoscopy or colonoscopy.  Talk with your health care provider to understand your risk for colorectal cancer and to make a screening plan that is right for you.  Questions to ask your health care provider  · When should I start colorectal cancer screening?  · What is my risk for colorectal cancer?  · How often do I need screening?  · Which screening tests do I need?  · How do I get my test results?  · What do my results mean?  Where to find more information  Learn more about colorectal cancer screening from:  · The American Cancer Society: cancer.org  · National Cancer Ivor: cancer.gov  Summary  · Colorectal cancer screening is a group of tests used to check for colorectal cancer before symptoms develop.  · All adults who are 45-75 years old should have screening. Your health care provider may recommend screening before age 45.  · You may have screening tests starting before age 45, or more often than other people, if you have certain risk factors.  · Screening reduces your risk for colorectal cancer and can help identify cancer at an early stage, when the cancer can be removed or treated more easily.  · Talk with your health care provider to understand your risk for colorectal cancer and to make a screening plan that is right for you.  This information is not intended to replace advice given to you by your health care provider. Make sure you discuss any questions you have with your health care provider.  Document Revised: 04/07/2021 Document Reviewed: 04/07/2021  [employer] Patient Education © 2021 [employer]

## 2021-10-02 VITALS
BODY MASS INDEX: 27.77 KG/M2 | TEMPERATURE: 97.5 F | SYSTOLIC BLOOD PRESSURE: 118 MMHG | HEART RATE: 78 BPM | RESPIRATION RATE: 14 BRPM | DIASTOLIC BLOOD PRESSURE: 60 MMHG | WEIGHT: 194 LBS | HEIGHT: 70 IN | OXYGEN SATURATION: 96 %

## 2021-10-02 LAB
ALBUMIN SERPL-MCNC: 4.4 G/DL (ref 3.5–5.2)
ALBUMIN/GLOB SERPL: 1.5 G/DL
ALP SERPL-CCNC: 63 U/L (ref 39–117)
ALT SERPL W P-5'-P-CCNC: 27 U/L (ref 1–41)
ANION GAP SERPL CALCULATED.3IONS-SCNC: 12.4 MMOL/L (ref 5–15)
AST SERPL-CCNC: 23 U/L (ref 1–40)
BILIRUB SERPL-MCNC: 0.4 MG/DL (ref 0–1.2)
BUN SERPL-MCNC: 11 MG/DL (ref 8–23)
BUN/CREAT SERPL: 12.2 (ref 7–25)
BURR CELLS BLD QL SMEAR: ABNORMAL
CALCIUM SPEC-SCNC: 9.3 MG/DL (ref 8.6–10.5)
CHLORIDE SERPL-SCNC: 105 MMOL/L (ref 98–107)
CHOLEST SERPL-MCNC: 80 MG/DL (ref 0–200)
CO2 SERPL-SCNC: 21.6 MMOL/L (ref 22–29)
CREAT SERPL-MCNC: 0.9 MG/DL (ref 0.76–1.27)
DACRYOCYTES BLD QL SMEAR: ABNORMAL
DEPRECATED RDW RBC AUTO: 45.9 FL (ref 37–54)
EOSINOPHIL # BLD MANUAL: 1.04 10*3/MM3 (ref 0–0.4)
EOSINOPHIL NFR BLD MANUAL: 13.3 % (ref 0.3–6.2)
ERYTHROCYTE [DISTWIDTH] IN BLOOD BY AUTOMATED COUNT: 13.4 % (ref 12.3–15.4)
GFR SERPL CREATININE-BSD FRML MDRD: 84 ML/MIN/1.73
GLOBULIN UR ELPH-MCNC: 2.9 GM/DL
GLUCOSE SERPL-MCNC: 151 MG/DL (ref 65–99)
HBA1C MFR BLD: 7.8 % (ref 4.8–5.6)
HCT VFR BLD AUTO: 46.3 % (ref 37.5–51)
HDLC SERPL-MCNC: 31 MG/DL (ref 40–60)
HGB BLD-MCNC: 15.5 G/DL (ref 13–17.7)
LDLC SERPL CALC-MCNC: 28 MG/DL (ref 0–100)
LDLC/HDLC SERPL: 0.83 {RATIO}
LYMPHOCYTES # BLD MANUAL: 3.11 10*3/MM3 (ref 0.7–3.1)
LYMPHOCYTES NFR BLD MANUAL: 10.2 % (ref 5–12)
LYMPHOCYTES NFR BLD MANUAL: 39.8 % (ref 19.6–45.3)
MCH RBC QN AUTO: 31.1 PG (ref 26.6–33)
MCHC RBC AUTO-ENTMCNC: 33.5 G/DL (ref 31.5–35.7)
MCV RBC AUTO: 93 FL (ref 79–97)
MONOCYTES # BLD AUTO: 0.8 10*3/MM3 (ref 0.1–0.9)
NEUTROPHILS # BLD AUTO: 2.87 10*3/MM3 (ref 1.7–7)
NEUTROPHILS NFR BLD MANUAL: 36.7 % (ref 42.7–76)
NRBC BLD AUTO-RTO: 0 /100 WBC (ref 0–0.2)
OVALOCYTES BLD QL SMEAR: ABNORMAL
PLAT MORPH BLD: NORMAL
PLATELET # BLD AUTO: 209 10*3/MM3 (ref 140–450)
PMV BLD AUTO: 10.4 FL (ref 6–12)
POIKILOCYTOSIS BLD QL SMEAR: ABNORMAL
POTASSIUM SERPL-SCNC: 4 MMOL/L (ref 3.5–5.2)
PROT SERPL-MCNC: 7.3 G/DL (ref 6–8.5)
RBC # BLD AUTO: 4.98 10*6/MM3 (ref 4.14–5.8)
SODIUM SERPL-SCNC: 139 MMOL/L (ref 136–145)
TRIGL SERPL-MCNC: 116 MG/DL (ref 0–150)
TSH SERPL DL<=0.05 MIU/L-ACNC: 1.55 UIU/ML (ref 0.27–4.2)
VIT B12 BLD-MCNC: 303 PG/ML (ref 211–946)
VLDLC SERPL-MCNC: 21 MG/DL (ref 5–40)
WBC # BLD AUTO: 7.82 10*3/MM3 (ref 3.4–10.8)
WBC MORPH BLD: NORMAL

## 2021-10-13 RX ORDER — MELOXICAM 15 MG/1
TABLET ORAL
Qty: 90 TABLET | Refills: 3 | Status: SHIPPED | OUTPATIENT
Start: 2021-10-13 | End: 2022-04-04

## 2021-10-20 DIAGNOSIS — K21.9 GASTRO-ESOPHAGEAL REFLUX DISEASE WITHOUT ESOPHAGITIS: ICD-10-CM

## 2021-10-20 RX ORDER — PANTOPRAZOLE SODIUM 40 MG/1
TABLET, DELAYED RELEASE ORAL
Qty: 30 TABLET | Refills: 5 | Status: SHIPPED | OUTPATIENT
Start: 2021-10-20 | End: 2022-04-04 | Stop reason: SDUPTHER

## 2022-01-04 ENCOUNTER — OFFICE VISIT (OUTPATIENT)
Dept: FAMILY MEDICINE CLINIC | Facility: CLINIC | Age: 67
End: 2022-01-04

## 2022-01-04 DIAGNOSIS — E78.2 MIXED HYPERLIPIDEMIA: ICD-10-CM

## 2022-01-04 DIAGNOSIS — J30.2 CHRONIC SEASONAL ALLERGIC RHINITIS: Primary | ICD-10-CM

## 2022-01-04 DIAGNOSIS — Z72.0 DIPS TOBACCO: ICD-10-CM

## 2022-01-04 DIAGNOSIS — L21.9 SEBORRHEIC DERMATITIS: ICD-10-CM

## 2022-01-04 DIAGNOSIS — Z00.00 HEALTHCARE MAINTENANCE: ICD-10-CM

## 2022-01-04 DIAGNOSIS — K21.9 GASTRO-ESOPHAGEAL REFLUX DISEASE WITHOUT ESOPHAGITIS: ICD-10-CM

## 2022-01-04 DIAGNOSIS — R35.0 URINARY FREQUENCY: ICD-10-CM

## 2022-01-04 DIAGNOSIS — E11.40 TYPE 2 DIABETES MELLITUS WITH DIABETIC NEUROPATHY, WITHOUT LONG-TERM CURRENT USE OF INSULIN: ICD-10-CM

## 2022-01-04 DIAGNOSIS — I10 ESSENTIAL HYPERTENSION: ICD-10-CM

## 2022-01-04 PROCEDURE — 99214 OFFICE O/P EST MOD 30 MIN: CPT | Performed by: GENERAL PRACTICE

## 2022-01-04 RX ORDER — TRIAMCINOLONE ACETONIDE 1 MG/ML
LOTION TOPICAL 2 TIMES DAILY
Qty: 120 ML | Refills: 5 | Status: SHIPPED | OUTPATIENT
Start: 2022-01-04 | End: 2023-02-20

## 2022-01-04 RX ORDER — METFORMIN HYDROCHLORIDE 500 MG/1
500 TABLET, EXTENDED RELEASE ORAL DAILY
Qty: 90 TABLET | Refills: 5 | Status: SHIPPED | OUTPATIENT
Start: 2022-01-04 | End: 2022-11-14 | Stop reason: SDUPTHER

## 2022-01-04 NOTE — PROGRESS NOTES
Subjective   Ed Villalba is a 66 y.o. male.     Chief Complaint  He returns for a scheduled reassessment of multiple medical problems including urinary frequency, type 2 diabetes mellitus, hyperlipidemia, essential hypertension, and GERD    History of Present Illness     Urinary Frequency  He continues to have nocturia x 2-10.  He admits to occasional hesitancy but but there is no history of any other difficulty voiding and he continues to deny any urgency, dysuria, or hematuria.  He has been tried on both anticholinergic medications and mirabegron with no consistent benefit.  He was followed by urology in the past but remains uninterested in returning    Diabetes  Current symptoms include polyuria.  He continues to deny any paresthesias of the feet, visual disturbances, polydipsia, hypoglycemia or foot ulcerations. Evaluation to date has been: hemoglobin A1C. Home sugars: have remained somewhat labile since last here. Current treatments - metformin and sitagliptin. He underwent an updated diabetic eye exam on 3/31/2021.  Lab Results   Component Value Date    HGBA1C 7.80 (H) 10/01/2021      Lab Results   Component Value Date    CRAUMGSG56 303 10/01/2021     Dyslipidemia  Compliance with treatment remains excellent. The patient exercises daily. He remains on atorvastatin with no apparent side effects.   Lab Results   Component Value Date    CHOL 80 10/01/2021    CHLPL 88 05/02/2016    TRIG 116 10/01/2021    HDL 31 (L) 10/01/2021    LDL 28 10/01/2021     Hypertension  Home blood pressure readings: are consistently at goal. Associated signs and symptoms: none.  He continues to deny any chest pain, palpitations, dyspnea, orthopnea, paroxysmal nocturnal dyspnea or peripheral edema. Current antihypertensive medications includes - lisinopril.  Lab Results   Component Value Date    GLUCOSE 151 (H) 10/01/2021    BUN 11 10/01/2021    CREATININE 0.90 10/01/2021    EGFRIFNONA 84 10/01/2021    BCR 12.2 10/01/2021    K 4.0  10/01/2021    CO2 21.6 (L) 10/01/2021    CALCIUM 9.3 10/01/2021    ALBUMIN 4.40 10/01/2021    LABIL2 1.4 (L) 05/02/2016    AST 23 10/01/2021    ALT 27 10/01/2021     Lab Results   Component Value Date    ALKPHOS 63 10/01/2021     GERD  He remains heartburn free on pantoprazole.  There is no history of any difficulty swallowing, nausea, vomiting, change in his bowel habits, hematochezia, or melena    Labs  Lab Results   Component Value Date    TSH 1.550 10/01/2021     The following portions of the patient's history were reviewed and updated as appropriate: allergies, current medications, past medical history, past social history and problem list.    Review of Systems   Constitutional: Positive for fatigue. Negative for appetite change, chills, fever and unexpected weight change.   HENT: Positive for rhinorrhea and sneezing. Negative for congestion, ear pain, postnasal drip, sinus pressure and sore throat.    Eyes: Negative for visual disturbance.   Respiratory: Negative for cough, shortness of breath and wheezing.    Cardiovascular: Negative for chest pain, palpitations and leg swelling.   Gastrointestinal: Negative for abdominal pain, blood in stool, constipation, diarrhea, nausea and vomiting.   Endocrine: Positive for polyuria. Negative for polydipsia.   Genitourinary: Positive for difficulty urinating (intermittent hesitancy) and frequency. Negative for dysuria, hematuria and urgency.        Nocturia × 1-10 at present   Musculoskeletal: Positive for arthralgias. Negative for back pain and myalgias.   Skin: Negative for rash.   Neurological: Negative for weakness, numbness and headaches.   Psychiatric/Behavioral: Positive for sleep disturbance (due to nocturia). Negative for dysphoric mood. The patient is not nervous/anxious.      Objective   Physical Exam  Constitutional:       General: He is not in acute distress.     Appearance: Normal appearance. He is well-developed. He is not ill-appearing or diaphoretic.       Comments: Accompanied by his wife. Bright and in good spirits. No apparent distress. No pallor, jaundice, diaphoresis, or cyanosis.   HENT:      Head: Atraumatic.      Right Ear: Tympanic membrane, ear canal and external ear normal.      Left Ear: Tympanic membrane, ear canal and external ear normal.      Mouth/Throat:      Lips: No lesions.      Mouth: Mucous membranes are moist. No oral lesions.      Dentition: Dental caries present.      Pharynx: No posterior oropharyngeal erythema.   Eyes:      Conjunctiva/sclera: Conjunctivae normal.   Neck:      Thyroid: No thyroid mass or thyromegaly.      Vascular: No carotid bruit or JVD.      Trachea: Trachea normal. No tracheal deviation.   Cardiovascular:      Rate and Rhythm: Normal rate and regular rhythm.      Heart sounds: Normal heart sounds, S1 normal and S2 normal. No murmur heard.  No gallop. No S3 or S4 sounds.    Pulmonary:      Effort: Pulmonary effort is normal.      Breath sounds: Normal breath sounds.   Chest:   Breasts:      Right: No supraclavicular adenopathy.      Left: No supraclavicular adenopathy.       Abdominal:      General: Bowel sounds are normal. There is no distension.   Musculoskeletal:      Right lower leg: No edema.      Left lower leg: No edema.   Lymphadenopathy:      Head:      Right side of head: No submental, submandibular, tonsillar, preauricular, posterior auricular or occipital adenopathy.      Left side of head: No submental, submandibular, tonsillar, preauricular, posterior auricular or occipital adenopathy.      Cervical: No cervical adenopathy.      Upper Body:      Right upper body: No supraclavicular adenopathy.      Left upper body: No supraclavicular adenopathy.   Skin:     General: Skin is warm and dry.      Coloration: Skin is not cyanotic, jaundiced or pale.      Findings: No rash.      Nails: There is no clubbing.   Neurological:      Mental Status: He is alert and oriented to person, place, and time.      Cranial  Nerves: No cranial nerve deficit.      Sensory: Sensory deficit (decreased vibration sense toes of both feet) present.      Motor: No tremor.      Coordination: Coordination normal.      Gait: Gait normal.   Psychiatric:         Attention and Perception: Attention normal.         Mood and Affect: Mood normal.         Speech: Speech normal.         Behavior: Behavior normal.         Thought Content: Thought content normal.       Assessment/Plan   Problems Addressed this Visit        Allergies and Adverse Reactions    Chronic seasonal allergic rhinitis       Cardiac and Vasculature    Essential hypertension   Hypertension: at goal. Evidence of target organ damage: none.  Encouraged to continue to work on diet and exercise plan.   Continue current medication    Mixed hyperlipidemia  As above.   Continue current medication.       Endocrine and Metabolic    Type 2 diabetes mellitus with diabetic neuropathy, without long-term current use of insulin (HCC)  Diabetes mellitus Type II, under fair control.   Encouraged to continue to pursue ADA diet  Encouraged aerobic exercise.  Continue current medication  Updated labs will be drawn at his return.    Relevant Medications    metFORMIN ER (GLUCOPHAGE-XR) 500 MG 24 hr tablet       Gastrointestinal Abdominal     Gastro-esophageal reflux disease without esophagitis   Symptoms are currently well controlled.  Reminded regarding lifestyle modification.  Continue current medication.       Genitourinary and Reproductive     Urinary frequency  Recommended a urology reassessment.  Patient remains uninterested but will let us know if he should change his mind       Health Encounters    Healthcare maintenance  Recommended a flu shot along with a third dose of the Moderna COVID-19 vaccine.  He will consider  Reminded that he is also due for an updated Pneumovax and Shingrix       Skin    Seborrheic dermatitis    Relevant Medications    triamcinolone (KENALOG) 0.1 % lotion       Tobacco     Dips tobacco      Diagnoses       Codes Comments    Chronic seasonal allergic rhinitis    -  Primary ICD-10-CM: J30.2  ICD-9-CM: 477.8     Essential hypertension     ICD-10-CM: I10  ICD-9-CM: 401.9     Mixed hyperlipidemia     ICD-10-CM: E78.2  ICD-9-CM: 272.2     Type 2 diabetes mellitus with diabetic neuropathy, without long-term current use of insulin (HCC)     ICD-10-CM: E11.40  ICD-9-CM: 250.60, 357.2     Gastro-esophageal reflux disease without esophagitis     ICD-10-CM: K21.9  ICD-9-CM: 530.81     Urinary frequency     ICD-10-CM: R35.0  ICD-9-CM: 788.41     Healthcare maintenance     ICD-10-CM: Z00.00  ICD-9-CM: V70.0     Dips tobacco     ICD-10-CM: Z72.0  ICD-9-CM: 305.1     Seborrheic dermatitis     ICD-10-CM: L21.9  ICD-9-CM: 690.10

## 2022-01-05 VITALS
WEIGHT: 195 LBS | OXYGEN SATURATION: 98 % | HEART RATE: 75 BPM | BODY MASS INDEX: 27.92 KG/M2 | DIASTOLIC BLOOD PRESSURE: 60 MMHG | TEMPERATURE: 97.4 F | RESPIRATION RATE: 14 BRPM | SYSTOLIC BLOOD PRESSURE: 121 MMHG | HEIGHT: 70 IN

## 2022-01-08 DIAGNOSIS — E78.2 MIXED HYPERLIPIDEMIA: ICD-10-CM

## 2022-01-10 RX ORDER — ATORVASTATIN CALCIUM 20 MG/1
TABLET, FILM COATED ORAL
Qty: 90 TABLET | Refills: 3 | Status: SHIPPED | OUTPATIENT
Start: 2022-01-10 | End: 2023-01-23

## 2022-02-28 DIAGNOSIS — I10 ESSENTIAL HYPERTENSION: ICD-10-CM

## 2022-02-28 RX ORDER — LISINOPRIL 20 MG/1
TABLET ORAL
Qty: 30 TABLET | Refills: 5 | Status: SHIPPED | OUTPATIENT
Start: 2022-02-28 | End: 2022-08-05 | Stop reason: SDUPTHER

## 2022-03-07 DIAGNOSIS — E11.40 TYPE 2 DIABETES MELLITUS WITH DIABETIC NEUROPATHY, WITHOUT LONG-TERM CURRENT USE OF INSULIN: ICD-10-CM

## 2022-03-07 RX ORDER — SITAGLIPTIN 100 MG/1
TABLET, FILM COATED ORAL
Qty: 90 TABLET | Refills: 3 | Status: SHIPPED | OUTPATIENT
Start: 2022-03-07 | End: 2023-02-27 | Stop reason: SDUPTHER

## 2022-04-04 ENCOUNTER — OFFICE VISIT (OUTPATIENT)
Dept: FAMILY MEDICINE CLINIC | Facility: CLINIC | Age: 67
End: 2022-04-04

## 2022-04-04 VITALS
HEART RATE: 95 BPM | WEIGHT: 194 LBS | BODY MASS INDEX: 27.77 KG/M2 | SYSTOLIC BLOOD PRESSURE: 124 MMHG | OXYGEN SATURATION: 99 % | DIASTOLIC BLOOD PRESSURE: 65 MMHG | HEIGHT: 70 IN | TEMPERATURE: 98.6 F | RESPIRATION RATE: 14 BRPM

## 2022-04-04 DIAGNOSIS — E78.2 MIXED HYPERLIPIDEMIA: ICD-10-CM

## 2022-04-04 DIAGNOSIS — I10 ESSENTIAL HYPERTENSION: ICD-10-CM

## 2022-04-04 DIAGNOSIS — Z72.0 DIPS TOBACCO: ICD-10-CM

## 2022-04-04 DIAGNOSIS — K02.9 DENTAL CARIES: ICD-10-CM

## 2022-04-04 DIAGNOSIS — R35.0 URINARY FREQUENCY: ICD-10-CM

## 2022-04-04 DIAGNOSIS — E11.40 TYPE 2 DIABETES MELLITUS WITH DIABETIC NEUROPATHY, WITHOUT LONG-TERM CURRENT USE OF INSULIN: ICD-10-CM

## 2022-04-04 DIAGNOSIS — J30.2 CHRONIC SEASONAL ALLERGIC RHINITIS: Primary | ICD-10-CM

## 2022-04-04 DIAGNOSIS — M25.542 ARTHRALGIA OF BOTH HANDS: ICD-10-CM

## 2022-04-04 DIAGNOSIS — Z00.00 HEALTHCARE MAINTENANCE: ICD-10-CM

## 2022-04-04 DIAGNOSIS — K21.9 GASTRO-ESOPHAGEAL REFLUX DISEASE WITHOUT ESOPHAGITIS: ICD-10-CM

## 2022-04-04 DIAGNOSIS — M25.541 ARTHRALGIA OF BOTH HANDS: ICD-10-CM

## 2022-04-04 PROCEDURE — 99214 OFFICE O/P EST MOD 30 MIN: CPT | Performed by: GENERAL PRACTICE

## 2022-04-04 PROCEDURE — 80053 COMPREHEN METABOLIC PANEL: CPT | Performed by: GENERAL PRACTICE

## 2022-04-04 PROCEDURE — 83036 HEMOGLOBIN GLYCOSYLATED A1C: CPT | Performed by: GENERAL PRACTICE

## 2022-04-04 PROCEDURE — 80061 LIPID PANEL: CPT | Performed by: GENERAL PRACTICE

## 2022-04-04 PROCEDURE — 85025 COMPLETE CBC W/AUTO DIFF WBC: CPT | Performed by: GENERAL PRACTICE

## 2022-04-04 PROCEDURE — 36415 COLL VENOUS BLD VENIPUNCTURE: CPT | Performed by: GENERAL PRACTICE

## 2022-04-04 RX ORDER — ASPIRIN 81 MG/1
81 TABLET ORAL DAILY
Qty: 30 TABLET | Refills: 5 | Status: SHIPPED | OUTPATIENT
Start: 2022-04-04 | End: 2023-02-27 | Stop reason: SDUPTHER

## 2022-04-04 RX ORDER — ACETAMINOPHEN 500 MG
1000 TABLET ORAL 3 TIMES DAILY PRN
Qty: 180 TABLET | Refills: 5 | Status: SHIPPED | OUTPATIENT
Start: 2022-04-04 | End: 2022-11-18

## 2022-04-04 RX ORDER — FLUTICASONE PROPIONATE 50 MCG
2 SPRAY, SUSPENSION (ML) NASAL DAILY
Qty: 16 G | Refills: 5 | Status: SHIPPED | OUTPATIENT
Start: 2022-04-04 | End: 2023-02-27 | Stop reason: SDUPTHER

## 2022-04-04 RX ORDER — LORATADINE 10 MG/1
10 TABLET ORAL DAILY
Qty: 30 TABLET | Refills: 5 | Status: SHIPPED | OUTPATIENT
Start: 2022-04-04 | End: 2023-02-27 | Stop reason: SDUPTHER

## 2022-04-04 RX ORDER — PANTOPRAZOLE SODIUM 40 MG/1
40 TABLET, DELAYED RELEASE ORAL DAILY
Qty: 30 TABLET | Refills: 5 | Status: SHIPPED | OUTPATIENT
Start: 2022-04-04 | End: 2022-09-28

## 2022-04-04 RX ORDER — MELATONIN
1000 DAILY
Qty: 30 TABLET | Refills: 5 | Status: SHIPPED | OUTPATIENT
Start: 2022-04-04 | End: 2023-02-27 | Stop reason: SDUPTHER

## 2022-04-04 NOTE — PROGRESS NOTES
Subjective   Ed Villalba is a 66 y.o. male.     Chief Complaint  He returns for a scheduled reassessment of multiple medical problems including chronic urinary frequency, type 2 diabetes mellitus, hyperlipidemia, essential hypertension, gastroesophageal reflux disease, and chronic allergic rhinitis    History of Present Illness     Urinary Frequency  He continues to have nocturia x 2-10.  He admits to occasional hesitancy but there is no history of any other difficulty voiding and he continues to deny any urgency, dysuria, or hematuria.  He has been tried on both anticholinergic medications and mirabegron with no consistent benefit.  He was followed by urology in the past but remains uninterested in returning    Diabetes  Current symptoms include polyuria.  He continues to deny any paresthesias of the feet, visual disturbances, polydipsia, hypoglycemia or foot ulcerations. Evaluation to date has been: hemoglobin A1C. Home sugars: have remained somewhat labile since last here. Current treatments - metformin and sitagliptin. He underwent an updated diabetic eye exam on 3/31/2021.  He has had no recent labs    Dyslipidemia  Compliance with treatment remains excellent. The patient exercises daily. He remains on atorvastatin with no apparent side effects.     Hypertension  Home blood pressure readings: are consistently at goal. Associated signs and symptoms: none.  He continues to deny any chest pain, palpitations, dyspnea, orthopnea, paroxysmal nocturnal dyspnea or peripheral edema. Current antihypertensive medications includes - lisinopril.    GERD  He remains heartburn free on pantoprazole.  There is no history of any difficulty swallowing, nausea, vomiting, change in his bowel habits, hematochezia, or melena    Chronic Allergic Rhinitis  He is a 3 to 4 week history of increased nasal congestion, protal pressure, postnasal drip.  There is no history of any other upper respiratory tract symptoms and he denies any  fever or chills.  He remains on loratadine    The following portions of the patient's history were reviewed and updated as appropriate: allergies, current medications, past medical history, past social history and problem list.    Review of Systems   Constitutional: Positive for fatigue. Negative for appetite change, chills, fever and unexpected weight change.   HENT: Positive for congestion, postnasal drip, rhinorrhea, sinus pain and sneezing. Negative for ear pain, sinus pressure and sore throat.    Eyes: Negative for visual disturbance.   Respiratory: Negative for cough, shortness of breath and wheezing.    Cardiovascular: Negative for chest pain, palpitations and leg swelling.   Gastrointestinal: Negative for abdominal pain, blood in stool, constipation, diarrhea, nausea and vomiting.   Endocrine: Positive for polyuria. Negative for polydipsia.   Genitourinary: Positive for difficulty urinating (intermittent hesitancy) and frequency. Negative for dysuria, hematuria and urgency.        Nocturia × 1-10 at present   Musculoskeletal: Positive for arthralgias. Negative for back pain and myalgias.   Skin: Negative for rash.   Neurological: Negative for weakness, numbness and headaches.   Psychiatric/Behavioral: Positive for sleep disturbance (due to nocturia). Negative for dysphoric mood. The patient is not nervous/anxious.      Objective   Physical Exam  Constitutional:       General: He is not in acute distress.     Appearance: Normal appearance. He is well-developed. He is not ill-appearing or diaphoretic.      Comments: Accompanied by his wife. Bright and in good spirits. No apparent distress. No pallor, jaundice, diaphoresis, or cyanosis.   HENT:      Head: Atraumatic.      Right Ear: Tympanic membrane, ear canal and external ear normal.      Left Ear: Tympanic membrane, ear canal and external ear normal.      Mouth/Throat:      Lips: No lesions.      Mouth: Mucous membranes are moist. No oral lesions.       Dentition: Dental caries present.      Pharynx: No posterior oropharyngeal erythema.   Eyes:      Conjunctiva/sclera: Conjunctivae normal.   Neck:      Thyroid: No thyroid mass or thyromegaly.      Vascular: No carotid bruit or JVD.      Trachea: Trachea normal. No tracheal deviation.   Cardiovascular:      Rate and Rhythm: Normal rate and regular rhythm.      Heart sounds: Normal heart sounds, S1 normal and S2 normal. No murmur heard.    No gallop. No S3 or S4 sounds.   Pulmonary:      Effort: Pulmonary effort is normal.      Breath sounds: Normal breath sounds.   Chest:   Breasts:      Right: No supraclavicular adenopathy.      Left: No supraclavicular adenopathy.       Abdominal:      General: Bowel sounds are normal. There is no distension.   Musculoskeletal:      Right lower leg: No edema.      Left lower leg: No edema.   Lymphadenopathy:      Head:      Right side of head: No submental, submandibular, tonsillar, preauricular, posterior auricular or occipital adenopathy.      Left side of head: No submental, submandibular, tonsillar, preauricular, posterior auricular or occipital adenopathy.      Cervical: No cervical adenopathy.      Upper Body:      Right upper body: No supraclavicular adenopathy.      Left upper body: No supraclavicular adenopathy.   Skin:     General: Skin is warm and dry.      Coloration: Skin is not cyanotic, jaundiced or pale.      Findings: No rash.      Nails: There is no clubbing.   Neurological:      Mental Status: He is alert and oriented to person, place, and time.      Cranial Nerves: No cranial nerve deficit.      Sensory: Sensory deficit (decreased vibration sense toes of both feet) present.      Motor: No tremor.      Coordination: Coordination normal.      Gait: Gait normal.   Psychiatric:         Attention and Perception: Attention normal.         Mood and Affect: Mood normal.         Speech: Speech normal.         Behavior: Behavior normal.         Thought Content: Thought  content normal.       Assessment/Plan   Problems Addressed this Visit        Allergies and Adverse Reactions    Chronic seasonal allergic rhinitis  Reminded regarding allergen avoidance.  Reviewed further options and agreed on a trial of a nasal corticosteroid  Encouraged to report if any worse, any new symptoms, or if no better over the next month    Relevant Medications    fluticasone (Flonase) 50 MCG/ACT nasal spray       Cardiac and Vasculature    Essential hypertension   Hypertension: at goal. Evidence of target organ damage: none.  Encouraged to continue to work on diet and exercise plan.   Continue current medication    Relevant Orders    CBC & Differential    Comprehensive Metabolic Panel    Mixed hyperlipidemia  As above.   Continue current medication.    Relevant Orders    Comprehensive Metabolic Panel    Lipid Panel       ENT    Dental caries  Patient agrees to another referral to oral surgery    Relevant Orders    Ambulatory Referral to Oral Maxillofacial Surgery       Endocrine and Metabolic    Type 2 diabetes mellitus with diabetic neuropathy, without long-term current use of insulin (HCC)  Diabetes mellitus Type II, under unknown control.   Encouraged to continue to pursue ADA diet  Encouraged aerobic exercise.  Continue current medication  Updated labs drawn.    Relevant Orders    Hemoglobin A1c    MicroAlbumin, Urine, Random - Urine, Clean Catch       Gastrointestinal Abdominal     Gastro-esophageal reflux disease without esophagitis   Symptoms are currently well controlled.  Reminded regarding lifestyle modification.  Continue current medication.       Genitourinary and Reproductive     Urinary frequency  Patient remains uninterested in a urology reassessment  Encouraged to report if any worse or if any new symptoms or concerns.       Health Encounters    Healthcare maintenance  Recommended a third dose of the Moderna COVID-19 vaccine.  Reminded that he is also due for Shingrix  He remains  uninterested in any cancer screening       Musculoskeletal and Injuries    Arthralgia of both hands  Reminded regarding symptomatic treatment.   Reviewed options and agreed on a trial of acetaminophen and topical diclofenac in place of meloxicam    Relevant Medications    acetaminophen (TYLENOL) 500 MG tablet    Diclofenac Sodium (VOLTAREN) 1 % gel gel       Tobacco    Dips tobacco      Diagnoses       Codes Comments    Chronic seasonal allergic rhinitis    -  Primary ICD-10-CM: J30.2  ICD-9-CM: 477.8     Essential hypertension     ICD-10-CM: I10  ICD-9-CM: 401.9     Mixed hyperlipidemia     ICD-10-CM: E78.2  ICD-9-CM: 272.2     Type 2 diabetes mellitus with diabetic neuropathy, without long-term current use of insulin (HCC)     ICD-10-CM: E11.40  ICD-9-CM: 250.60, 357.2     Gastro-esophageal reflux disease without esophagitis     ICD-10-CM: K21.9  ICD-9-CM: 530.81     Urinary frequency     ICD-10-CM: R35.0  ICD-9-CM: 788.41     Healthcare maintenance     ICD-10-CM: Z00.00  ICD-9-CM: V70.0     Dips tobacco     ICD-10-CM: Z72.0  ICD-9-CM: 305.1     Arthralgia of both hands     ICD-10-CM: M25.541, M25.542  ICD-9-CM: 719.44     Dental caries     ICD-10-CM: K02.9  ICD-9-CM: 521.00

## 2022-04-05 LAB
ALBUMIN SERPL-MCNC: 4.3 G/DL (ref 3.5–5.2)
ALBUMIN/GLOB SERPL: 1.5 G/DL
ALP SERPL-CCNC: 65 U/L (ref 39–117)
ALT SERPL W P-5'-P-CCNC: 24 U/L (ref 1–41)
ANION GAP SERPL CALCULATED.3IONS-SCNC: 11 MMOL/L (ref 5–15)
AST SERPL-CCNC: 25 U/L (ref 1–40)
BASOPHILS # BLD AUTO: 0.07 10*3/MM3 (ref 0–0.2)
BASOPHILS NFR BLD AUTO: 0.7 % (ref 0–1.5)
BILIRUB SERPL-MCNC: 0.3 MG/DL (ref 0–1.2)
BUN SERPL-MCNC: 15 MG/DL (ref 8–23)
BUN/CREAT SERPL: 15.5 (ref 7–25)
CALCIUM SPEC-SCNC: 9.1 MG/DL (ref 8.6–10.5)
CHLORIDE SERPL-SCNC: 106 MMOL/L (ref 98–107)
CHOLEST SERPL-MCNC: 78 MG/DL (ref 0–200)
CO2 SERPL-SCNC: 21 MMOL/L (ref 22–29)
CREAT SERPL-MCNC: 0.97 MG/DL (ref 0.76–1.27)
DEPRECATED RDW RBC AUTO: 43.2 FL (ref 37–54)
EGFRCR SERPLBLD CKD-EPI 2021: 86.1 ML/MIN/1.73
EOSINOPHIL # BLD AUTO: 0.65 10*3/MM3 (ref 0–0.4)
EOSINOPHIL NFR BLD AUTO: 6.7 % (ref 0.3–6.2)
ERYTHROCYTE [DISTWIDTH] IN BLOOD BY AUTOMATED COUNT: 13.2 % (ref 12.3–15.4)
GLOBULIN UR ELPH-MCNC: 2.8 GM/DL
GLUCOSE SERPL-MCNC: 187 MG/DL (ref 65–99)
HBA1C MFR BLD: 7.2 % (ref 4.8–5.6)
HCT VFR BLD AUTO: 44.2 % (ref 37.5–51)
HDLC SERPL-MCNC: 33 MG/DL (ref 40–60)
HGB BLD-MCNC: 15.3 G/DL (ref 13–17.7)
IMM GRANULOCYTES # BLD AUTO: 0.03 10*3/MM3 (ref 0–0.05)
IMM GRANULOCYTES NFR BLD AUTO: 0.3 % (ref 0–0.5)
LDLC SERPL CALC-MCNC: 25 MG/DL (ref 0–100)
LDLC/HDLC SERPL: 0.74 {RATIO}
LYMPHOCYTES # BLD AUTO: 2.02 10*3/MM3 (ref 0.7–3.1)
LYMPHOCYTES NFR BLD AUTO: 20.8 % (ref 19.6–45.3)
MCH RBC QN AUTO: 31.2 PG (ref 26.6–33)
MCHC RBC AUTO-ENTMCNC: 34.6 G/DL (ref 31.5–35.7)
MCV RBC AUTO: 90.2 FL (ref 79–97)
MONOCYTES # BLD AUTO: 0.59 10*3/MM3 (ref 0.1–0.9)
MONOCYTES NFR BLD AUTO: 6.1 % (ref 5–12)
NEUTROPHILS NFR BLD AUTO: 6.33 10*3/MM3 (ref 1.7–7)
NEUTROPHILS NFR BLD AUTO: 65.4 % (ref 42.7–76)
NRBC BLD AUTO-RTO: 0 /100 WBC (ref 0–0.2)
PLATELET # BLD AUTO: 213 10*3/MM3 (ref 140–450)
PMV BLD AUTO: 10.5 FL (ref 6–12)
POTASSIUM SERPL-SCNC: 4.4 MMOL/L (ref 3.5–5.2)
PROT SERPL-MCNC: 7.1 G/DL (ref 6–8.5)
RBC # BLD AUTO: 4.9 10*6/MM3 (ref 4.14–5.8)
SODIUM SERPL-SCNC: 138 MMOL/L (ref 136–145)
TRIGL SERPL-MCNC: 103 MG/DL (ref 0–150)
VLDLC SERPL-MCNC: 20 MG/DL (ref 5–40)
WBC NRBC COR # BLD: 9.69 10*3/MM3 (ref 3.4–10.8)

## 2022-05-18 DIAGNOSIS — E11.40 TYPE 2 DIABETES MELLITUS WITH DIABETIC NEUROPATHY, WITHOUT LONG-TERM CURRENT USE OF INSULIN: ICD-10-CM

## 2022-05-19 RX ORDER — BLOOD SUGAR DIAGNOSTIC
STRIP MISCELLANEOUS
Qty: 50 EACH | Refills: 5 | Status: SHIPPED | OUTPATIENT
Start: 2022-05-19 | End: 2022-08-05 | Stop reason: SDUPTHER

## 2022-08-05 ENCOUNTER — OFFICE VISIT (OUTPATIENT)
Dept: FAMILY MEDICINE CLINIC | Facility: CLINIC | Age: 67
End: 2022-08-05

## 2022-08-05 DIAGNOSIS — Z00.00 HEALTHCARE MAINTENANCE: ICD-10-CM

## 2022-08-05 DIAGNOSIS — N52.8 OTHER MALE ERECTILE DYSFUNCTION: ICD-10-CM

## 2022-08-05 DIAGNOSIS — Z72.0 DIPS TOBACCO: ICD-10-CM

## 2022-08-05 DIAGNOSIS — K21.9 GASTRO-ESOPHAGEAL REFLUX DISEASE WITHOUT ESOPHAGITIS: ICD-10-CM

## 2022-08-05 DIAGNOSIS — R35.0 URINARY FREQUENCY: ICD-10-CM

## 2022-08-05 DIAGNOSIS — E78.2 MIXED HYPERLIPIDEMIA: ICD-10-CM

## 2022-08-05 DIAGNOSIS — K02.9 DENTAL CARIES: ICD-10-CM

## 2022-08-05 DIAGNOSIS — I10 ESSENTIAL HYPERTENSION: ICD-10-CM

## 2022-08-05 DIAGNOSIS — E11.40 TYPE 2 DIABETES MELLITUS WITH DIABETIC NEUROPATHY, WITHOUT LONG-TERM CURRENT USE OF INSULIN: ICD-10-CM

## 2022-08-05 DIAGNOSIS — J30.2 CHRONIC SEASONAL ALLERGIC RHINITIS: Primary | ICD-10-CM

## 2022-08-05 PROCEDURE — 99214 OFFICE O/P EST MOD 30 MIN: CPT | Performed by: GENERAL PRACTICE

## 2022-08-05 RX ORDER — SILDENAFIL 100 MG/1
TABLET, FILM COATED ORAL
Qty: 10 TABLET | Refills: 5 | Status: SHIPPED | OUTPATIENT
Start: 2022-08-05 | End: 2023-02-27

## 2022-08-05 RX ORDER — LISINOPRIL 30 MG/1
30 TABLET ORAL NIGHTLY
Qty: 90 TABLET | Refills: 3 | Status: SHIPPED | OUTPATIENT
Start: 2022-08-05 | End: 2023-02-27

## 2022-08-05 NOTE — PROGRESS NOTES
Subjective   Ed Villalba is a 67 y.o. male.     Chief Complaint  He returns for a scheduled reassessment of multiple medical problems including type 2 diabetes mellitus, hyperlipidemia, essential hypertension, Gastrosoft reflux disease, urinary frequency, and erectile dysfunction    History of Present Illness     Type 2 Diabetes Mellitus  He admits to intermittent polyuria.  There is no history of any paresthesias of the feet, visual disturbances, polydipsia, hypoglycemia or foot ulcerations. Evaluation to date has been: hemoglobin A1C. Home sugars: have generally been at or near goal since last here. Current treatments - metformin and sitagliptin.   Lab Results   Component Value Date    HGBA1C 7.20 (H) 04/04/2022     Hyperlipidemia  His compliance with treatment has been fairly good.  He is not following any particular diet but remains quite active. He is taking atorvastatin with no apparent side effects.   Lab Results   Component Value Date    CHOL 78 04/04/2022    CHLPL 88 05/02/2016    TRIG 103 04/04/2022    HDL 33 (L) 04/04/2022    LDL 25 04/04/2022     Essential Hypertension  Home blood pressure readings: he has not been checking lately associated signs and symptoms: none.  He continues to deny any chest pain, palpitations, dyspnea, orthopnea, paroxysmal nocturnal dyspnea or peripheral edema. Current antihypertensive medications includes - lisinopril.  Lab Results   Component Value Date    GLUCOSE 187 (H) 04/04/2022    BUN 15 04/04/2022    CREATININE 0.97 04/04/2022    EGFRIFNONA 84 10/01/2021    BCR 15.5 04/04/2022    K 4.4 04/04/2022    CO2 21.0 (L) 04/04/2022    CALCIUM 9.1 04/04/2022    ALBUMIN 4.30 04/04/2022    LABIL2 1.4 (L) 05/02/2016    AST 25 04/04/2022    ALT 24 04/04/2022     Lab Results   Component Value Date    ALKPHOS 65 04/04/2022     GERD  He remains heartburn free on pantoprazole.  There is no history of any difficulty swallowing, nausea, vomiting, change in his bowel habits, hematochezia,  or melena  Lab Results   Component Value Date    WBC 9.69 04/04/2022    HGB 15.3 04/04/2022    HCT 44.2 04/04/2022    MCV 90.2 04/04/2022     04/04/2022     Urinary Frequency  He has noted a significant improvement in his urinary frequency and nocturia since last here.  He is only have a 1-2 episodes of the latter nightly at present.  He admits to occasional hesitancy but there is no history of any other difficulty voiding and he continues to deny any urgency, dysuria, or hematuria.  He admits to a several year history of increasing difficulty obtaining and maintaining erection.  He denies any problems with his bowel or bladder control and there is no history of any changes in his voice or beard growth.  There is no history of any lower extremity weakness, numbness, or tingling.  He was followed by urology in the past but remains uninterested in returning    The following portions of the patient's history were reviewed and updated as appropriate: allergies, current medications, past medical history, past social history and problem list.    Review of Systems   Constitutional: Positive for fatigue. Negative for appetite change, chills, fever and unexpected weight change.   HENT: Positive for congestion, rhinorrhea and sneezing. Negative for ear pain, postnasal drip, sinus pressure, sinus pain and sore throat.    Eyes: Negative for visual disturbance.   Respiratory: Negative for cough, shortness of breath and wheezing.    Cardiovascular: Negative for chest pain, palpitations and leg swelling.   Gastrointestinal: Negative for abdominal pain, blood in stool, constipation, diarrhea, nausea and vomiting.   Endocrine: Positive for polyuria. Negative for polydipsia.   Genitourinary: Positive for difficulty urinating (intermittent hesitancy) and frequency. Negative for dysuria, hematuria and urgency.        Nocturia × 1-2 at present, erectile dysfunction   Musculoskeletal: Positive for arthralgias. Negative for back  pain and myalgias.   Skin: Negative for rash.   Neurological: Negative for weakness, numbness and headaches.   Psychiatric/Behavioral: Positive for sleep disturbance. Negative for dysphoric mood. The patient is not nervous/anxious.      Objective   Physical Exam  Constitutional:       General: He is not in acute distress.     Appearance: Normal appearance. He is well-developed. He is not ill-appearing or diaphoretic.      Comments: Accompanied by his wife. Bright and in good spirits. No apparent distress. No pallor, jaundice, diaphoresis, or cyanosis.   HENT:      Head: Atraumatic.      Right Ear: Tympanic membrane, ear canal and external ear normal.      Left Ear: Tympanic membrane, ear canal and external ear normal.      Mouth/Throat:      Lips: No lesions.      Mouth: Mucous membranes are moist. No oral lesions.      Dentition: Dental caries present.      Pharynx: No posterior oropharyngeal erythema.   Eyes:      Conjunctiva/sclera: Conjunctivae normal.   Neck:      Thyroid: No thyroid mass or thyromegaly.      Vascular: No carotid bruit or JVD.      Trachea: Trachea normal. No tracheal deviation.   Cardiovascular:      Rate and Rhythm: Normal rate and regular rhythm.      Heart sounds: Normal heart sounds, S1 normal and S2 normal. No murmur heard.    No gallop. No S3 or S4 sounds.   Pulmonary:      Effort: Pulmonary effort is normal.      Breath sounds: Normal breath sounds.   Chest:   Breasts:      Right: No supraclavicular adenopathy.      Left: No supraclavicular adenopathy.       Abdominal:      General: Bowel sounds are normal. There is no distension.   Musculoskeletal:      Right lower leg: No edema.      Left lower leg: No edema.   Lymphadenopathy:      Head:      Right side of head: No submental, submandibular, tonsillar, preauricular, posterior auricular or occipital adenopathy.      Left side of head: No submental, submandibular, tonsillar, preauricular, posterior auricular or occipital adenopathy.       Cervical: No cervical adenopathy.      Upper Body:      Right upper body: No supraclavicular adenopathy.      Left upper body: No supraclavicular adenopathy.   Skin:     General: Skin is warm and dry.      Coloration: Skin is not cyanotic, jaundiced or pale.      Findings: No rash.      Nails: There is no clubbing.   Neurological:      Mental Status: He is alert and oriented to person, place, and time.      Cranial Nerves: No cranial nerve deficit.      Sensory: Sensory deficit (decreased vibration sense toes of both feet) present.      Motor: No tremor.      Coordination: Coordination normal.      Gait: Gait normal.      Deep Tendon Reflexes:      Reflex Scores:       Patellar reflexes are 2+ on the right side and 2+ on the left side.       Achilles reflexes are 1+ on the right side and 1+ on the left side.  Psychiatric:         Attention and Perception: Attention normal.         Mood and Affect: Mood normal.         Speech: Speech normal.         Behavior: Behavior normal.         Thought Content: Thought content normal.       Assessment & Plan   Problems Addressed this Visit        Allergies and Adverse Reactions    Chronic seasonal allergic rhinitis        Cardiac and Vasculature    Essential hypertension   Hypertension: elevated today. Evidence of target organ damage: none.  Encouraged to continue to work on diet and exercise plan.   Lisinopril will be titrated to 30 nightly    Relevant Medications    lisinopril (PRINIVIL,ZESTRIL) 30 MG tablet    Mixed hyperlipidemia  As above.   Continue current medication.       ENT    Dental caries  We will try to finalize an oral surgery appointment       Endocrine and Metabolic    Type 2 diabetes mellitus with diabetic neuropathy, without long-term current use of insulin (HCC)  Diabetes mellitus Type II, under good control.   Encouraged to continue to pursue ADA diet  Encouraged aerobic exercise.  Continue current medication  Reminded to undergo an updated diabetic eye  exam  Updated labs will be drawn at his return.    Relevant Medications    glucose blood (OneTouch Ultra) test strip       Gastrointestinal Abdominal     Gastro-esophageal reflux disease without esophagitis   Symptoms are currently well controlled.  Encouraged to report if this should change.  Continue current medication       Genitourinary and Reproductive     Other male erectile dysfunction  Reviewed options and agreed on a trial of sildenafil.  Advised regarding the potential side effects including hearing and visual loss as well as the danger of combining with nitrates.    Relevant Medications    sildenafil (VIAGRA) 100 MG tablet    Urinary frequency  Patient remains uninterested in a urology reassessment  Encouraged to report if any worse or if any new symptoms or concerns.       Health Encounters    Healthcare maintenance  Strongly recommended a third dose of the Pfizer COVID-19 vaccine.  Recommended a flu shot and Prevnar 20 this fall  Will discuss Shingrix again at his return       Tobacco    Dips tobacco      Diagnoses       Codes Comments    Chronic seasonal allergic rhinitis    -  Primary ICD-10-CM: J30.2  ICD-9-CM: 477.8     Essential hypertension     ICD-10-CM: I10  ICD-9-CM: 401.9     Mixed hyperlipidemia     ICD-10-CM: E78.2  ICD-9-CM: 272.2     Type 2 diabetes mellitus with diabetic neuropathy, without long-term current use of insulin (HCC)     ICD-10-CM: E11.40  ICD-9-CM: 250.60, 357.2     Gastro-esophageal reflux disease without esophagitis     ICD-10-CM: K21.9  ICD-9-CM: 530.81     Urinary frequency     ICD-10-CM: R35.0  ICD-9-CM: 788.41     Healthcare maintenance     ICD-10-CM: Z00.00  ICD-9-CM: V70.0     Dips tobacco     ICD-10-CM: Z72.0  ICD-9-CM: 305.1     Other male erectile dysfunction     ICD-10-CM: N52.8  ICD-9-CM: 607.84     Dental caries     ICD-10-CM: K02.9  ICD-9-CM: 521.00

## 2022-08-06 VITALS
RESPIRATION RATE: 14 BRPM | OXYGEN SATURATION: 99 % | WEIGHT: 191 LBS | HEART RATE: 75 BPM | BODY MASS INDEX: 27.35 KG/M2 | HEIGHT: 70 IN | TEMPERATURE: 98.6 F | SYSTOLIC BLOOD PRESSURE: 152 MMHG | DIASTOLIC BLOOD PRESSURE: 78 MMHG

## 2022-08-08 ENCOUNTER — TELEPHONE (OUTPATIENT)
Dept: FAMILY MEDICINE CLINIC | Facility: CLINIC | Age: 67
End: 2022-08-08

## 2022-08-08 NOTE — TELEPHONE ENCOUNTER
Caller: Oriana Villalba    Relationship: Emergency Contact    Best call back number: [phone]    What orders are you requesting (i.e. lab or imaging): ONE TOUCH BLOOD GLUCOSE KIT INCLUDING METER TEST STRIPS LANCETS ECT.    Where will you receive your lab/imaging services: [facility] BELOW THE OFFICE ([facility] )    Additional notes: PLEASE CALL THE PATIENT TO LET HIM KNOW IF THAT CAN BE CALLED IN

## 2022-08-09 DIAGNOSIS — E11.40 TYPE 2 DIABETES MELLITUS WITH DIABETIC NEUROPATHY, WITHOUT LONG-TERM CURRENT USE OF INSULIN: ICD-10-CM

## 2022-08-09 RX ORDER — BLOOD-GLUCOSE METER
1 KIT MISCELLANEOUS ONCE
Qty: 1 EACH | Refills: 0 | Status: SHIPPED | OUTPATIENT
Start: 2022-08-09 | End: 2022-08-09

## 2022-08-09 RX ORDER — LANCETS 30 GAUGE
EACH MISCELLANEOUS
Qty: 100 EACH | Refills: 3 | Status: SHIPPED | OUTPATIENT
Start: 2022-08-09 | End: 2022-09-28

## 2022-08-29 RX ORDER — LISINOPRIL 20 MG/1
TABLET ORAL
Qty: 90 TABLET | Refills: 3 | Status: SHIPPED | OUTPATIENT
Start: 2022-08-29 | End: 2023-02-27

## 2022-09-28 DIAGNOSIS — K21.9 GASTRO-ESOPHAGEAL REFLUX DISEASE WITHOUT ESOPHAGITIS: ICD-10-CM

## 2022-09-28 DIAGNOSIS — E11.40 TYPE 2 DIABETES MELLITUS WITH DIABETIC NEUROPATHY, WITHOUT LONG-TERM CURRENT USE OF INSULIN: ICD-10-CM

## 2022-09-28 RX ORDER — MELOXICAM 15 MG/1
TABLET ORAL
Qty: 30 TABLET | Refills: 5 | Status: SHIPPED | OUTPATIENT
Start: 2022-09-28 | End: 2023-02-27 | Stop reason: SDUPTHER

## 2022-09-28 RX ORDER — PANTOPRAZOLE SODIUM 40 MG/1
TABLET, DELAYED RELEASE ORAL
Qty: 30 TABLET | Refills: 5 | Status: SHIPPED | OUTPATIENT
Start: 2022-09-28 | End: 2023-02-27 | Stop reason: SDUPTHER

## 2022-09-28 RX ORDER — LANCETS 33 GAUGE
EACH MISCELLANEOUS
Qty: 100 EACH | Refills: 3 | Status: SHIPPED | OUTPATIENT
Start: 2022-09-28

## 2022-10-19 DIAGNOSIS — M25.542 ARTHRALGIA OF BOTH HANDS: ICD-10-CM

## 2022-10-19 DIAGNOSIS — M25.541 ARTHRALGIA OF BOTH HANDS: ICD-10-CM

## 2022-11-14 ENCOUNTER — OFFICE VISIT (OUTPATIENT)
Dept: FAMILY MEDICINE CLINIC | Facility: CLINIC | Age: 67
End: 2022-11-14

## 2022-11-14 VITALS
BODY MASS INDEX: 26.92 KG/M2 | HEIGHT: 70 IN | TEMPERATURE: 98.6 F | OXYGEN SATURATION: 97 % | DIASTOLIC BLOOD PRESSURE: 76 MMHG | HEART RATE: 63 BPM | RESPIRATION RATE: 14 BRPM | SYSTOLIC BLOOD PRESSURE: 152 MMHG | WEIGHT: 188 LBS

## 2022-11-14 DIAGNOSIS — E11.40 TYPE 2 DIABETES MELLITUS WITH DIABETIC NEUROPATHY, WITHOUT LONG-TERM CURRENT USE OF INSULIN: ICD-10-CM

## 2022-11-14 DIAGNOSIS — Z00.00 HEALTHCARE MAINTENANCE: ICD-10-CM

## 2022-11-14 DIAGNOSIS — M25.541 ARTHRALGIA OF BOTH HANDS: ICD-10-CM

## 2022-11-14 DIAGNOSIS — K21.9 GASTRO-ESOPHAGEAL REFLUX DISEASE WITHOUT ESOPHAGITIS: ICD-10-CM

## 2022-11-14 DIAGNOSIS — L21.9 SEBORRHEIC DERMATITIS: ICD-10-CM

## 2022-11-14 DIAGNOSIS — K02.9 DENTAL CARIES: ICD-10-CM

## 2022-11-14 DIAGNOSIS — I10 ESSENTIAL HYPERTENSION: ICD-10-CM

## 2022-11-14 DIAGNOSIS — J30.2 CHRONIC SEASONAL ALLERGIC RHINITIS: Primary | ICD-10-CM

## 2022-11-14 DIAGNOSIS — R35.0 URINARY FREQUENCY: ICD-10-CM

## 2022-11-14 DIAGNOSIS — Z23 ENCOUNTER FOR IMMUNIZATION: ICD-10-CM

## 2022-11-14 DIAGNOSIS — Z72.0 DIPS TOBACCO: ICD-10-CM

## 2022-11-14 DIAGNOSIS — M25.542 ARTHRALGIA OF BOTH HANDS: ICD-10-CM

## 2022-11-14 DIAGNOSIS — N52.8 OTHER MALE ERECTILE DYSFUNCTION: ICD-10-CM

## 2022-11-14 DIAGNOSIS — E78.2 MIXED HYPERLIPIDEMIA: ICD-10-CM

## 2022-11-14 PROCEDURE — 82043 UR ALBUMIN QUANTITATIVE: CPT | Performed by: GENERAL PRACTICE

## 2022-11-14 PROCEDURE — 80053 COMPREHEN METABOLIC PANEL: CPT | Performed by: GENERAL PRACTICE

## 2022-11-14 PROCEDURE — 1159F MED LIST DOCD IN RCRD: CPT | Performed by: GENERAL PRACTICE

## 2022-11-14 PROCEDURE — 83036 HEMOGLOBIN GLYCOSYLATED A1C: CPT | Performed by: GENERAL PRACTICE

## 2022-11-14 PROCEDURE — 84443 ASSAY THYROID STIM HORMONE: CPT | Performed by: GENERAL PRACTICE

## 2022-11-14 PROCEDURE — 80061 LIPID PANEL: CPT | Performed by: GENERAL PRACTICE

## 2022-11-14 PROCEDURE — 82607 VITAMIN B-12: CPT | Performed by: GENERAL PRACTICE

## 2022-11-14 PROCEDURE — G0439 PPPS, SUBSEQ VISIT: HCPCS | Performed by: GENERAL PRACTICE

## 2022-11-14 PROCEDURE — 90662 IIV NO PRSV INCREASED AG IM: CPT | Performed by: GENERAL PRACTICE

## 2022-11-14 PROCEDURE — 1170F FXNL STATUS ASSESSED: CPT | Performed by: GENERAL PRACTICE

## 2022-11-14 PROCEDURE — G0008 ADMIN INFLUENZA VIRUS VAC: HCPCS | Performed by: GENERAL PRACTICE

## 2022-11-14 PROCEDURE — 85025 COMPLETE CBC W/AUTO DIFF WBC: CPT | Performed by: GENERAL PRACTICE

## 2022-11-14 RX ORDER — METFORMIN HYDROCHLORIDE 500 MG/1
500 TABLET, EXTENDED RELEASE ORAL DAILY
Qty: 90 TABLET | Refills: 5 | Status: SHIPPED | OUTPATIENT
Start: 2022-11-14

## 2022-11-14 NOTE — PATIENT INSTRUCTIONS
Advance Directive  Advance directives are legal documents that allow you to make decisions about your health care and medical treatment in case you become unable to communicate for yourself. Advance directives let your wishes be known to family, friends, and health care providers.  Discussing and writing advance directives should happen over time rather than all at once. Advance directives can be changed and updated at any time. There are different types of advance directives, such as:  Medical power of .  Living will.  Do not resuscitate (DNR) order or do not attempt resuscitation (DNAR) order.  Health care proxy and medical power of   A health care proxy is also called a health care agent. This person is appointed to make medical decisions for you when you are unable to make decisions for yourself. Generally, people ask a trusted friend or family member to act as their proxy and represent their preferences. Make sure you have an agreement with your trusted person to act as your proxy. A proxy may have to make a medical decision on your behalf if your wishes are not known.  A medical power of , also called a durable power of  for health care, is a legal document that names your health care proxy. Depending on the laws in your state, the document may need to be:  Signed.  Notarized.  Dated.  Copied.  Witnessed.  Incorporated into your medical record.  You may also want to appoint a trusted person to manage your money in the event you are unable to do so. This is called a durable power of  for finances. It is a separate legal document from the durable power of  for health care. You may choose your health care proxy or someone different to act as your agent in money matters.  If you do not appoint a proxy, or there is a concern that the proxy is not acting in your best interest, a court may appoint a guardian to act on your behalf.  Living will  A living will is a set of  instructions that state your wishes about medical care when you cannot express them yourself. Health care providers should keep a copy of your living will in your medical record. You may want to give a copy to family members or friends. To alert caregivers in case of an emergency, you can place a card in your wallet to let them know that you have a living will and where they can find it. A living will is used if you become:  Terminally ill.  Disabled.  Unable to communicate or make decisions.  The following decisions should be included in your living will:  To use or not to use life support equipment, such as dialysis machines and breathing machines (ventilators).  Whether you want a DNR or DNAR order. This tells health care providers not to use cardiopulmonary resuscitation (CPR) if breathing or heartbeat stops.  To use or not to use tube feeding.  To be given or not to be given food and fluids.  Whether you want comfort (palliative) care when the goal becomes comfort rather than a cure.  Whether you want to donate your organs and tissues.  A living will does not give instructions for distributing your money and property if you should pass away.  DNR or DNAR  A DNR or DNAR order is a request not to have CPR in the event that your heart stops beating or you stop breathing. If a DNR or DNAR order has not been made and shared, a health care provider will try to help any patient whose heart has stopped or who has stopped breathing. If you plan to have surgery, talk with your health care provider about how your DNR or DNAR order will be followed if problems occur.  What if I do not have an advance directive?  Some states assign family decision makers to act on your behalf if you do not have an advance directive. Each state has its own laws about advance directives. You may want to check with your health care provider, , or state representative about the laws in your state.  Summary  Advance directives are legal  documents that allow you to make decisions about your health care and medical treatment in case you become unable to communicate for yourself.  The process of discussing and writing advance directives should happen over time. You can change and update advance directives at any time.  Advance directives may include a medical power of , a living will, and a DNR or DNAR order.  This information is not intended to replace advice given to you by your health care provider. Make sure you discuss any questions you have with your health care provider.  Document Revised: 09/21/2021 Document Reviewed: 09/21/2021  [employer] Patient Education © 2022 [employer]  Colorectal Cancer Screening  Colorectal cancer screening is a group of tests that are used to check for colorectal cancer before symptoms develop. Colorectal refers to the colon and rectum. The colon and rectum are located at the end of the digestive tract and carry stool (feces) out of the body.  Who should have screening?  All adults who are 45-75 years old should have screening. Your health care provider may recommend screening before age 45. You will have tests every 1-10 years, depending on your results and the type of screening test. Screening recommendations for adults who are 76-85 years old vary depending on a person's health. People older than age 85 should no longer get colorectal cancer screening.  You may have screening tests starting before age 45, or more often than other people, if you have any of these risk factors:  A personal or family history of colorectal cancer or abnormal growths known as polyps in your colon.  Inflammatory bowel disease, such as ulcerative colitis or Crohn's disease.  A history of having radiation treatment to the abdomen or the area between the hip bones (pelvic area) for cancer.  A type of genetic syndrome that is passed from parent to child (hereditary), such as:  Fajardo syndrome.  Familial adenomatous  polyposis.  Turcot syndrome.  Peutz-Jeghers syndrome.  MUTYH-associated polyposis (MAP).  A personal history of diabetes.  Types of tests  There are several types of colorectal screening tests. You may have one or more of the following:  Guaiac-based fecal occult blood testing. For this test, a stool sample is checked for hidden (occult) blood, which could be a sign of colorectal cancer.  Fecal immunochemical test (FIT). For this test, a stool sample is checked for blood, which could be a sign of colorectal cancer.  Stool DNA test. For this test, a stool sample is checked for blood and changes in DNA that could lead to colorectal cancer.  Sigmoidoscopy. During this test, a thin, flexible tube with a camera on the end, called a sigmoidoscope, is used to examine the rectum and the lower colon.  Colonoscopy. During this test, a long, flexible tube with a camera on the end, called a colonoscope, is used to examine the entire colon and rectum. Also, sometimes a tissue sample is taken to be looked at under a microscope (biopsy) or small polyps are removed during this test.  Virtual colonoscopy. Instead of a colonoscope, this type of colonoscopy uses a CT scan to take pictures of the colon and rectum. A CT scan is a type of X-ray that is made using computers.  What are the benefits of screening?  Screening reduces your risk for colorectal cancer and can help identify cancer at an early stage, when the cancer can be removed or treated more easily. It is common for polyps to form in the lining of the colon, especially as you age. These polyps may be cancerous or become cancerous over time. Screening can identify these polyps.  What are the risks of screening?  Generally, these are safe tests. However, problems may occur, including:  The need for more tests to confirm results from a stool sample test. Stool sample tests have fewer risks than other types of screening tests.  Being exposed to low levels of radiation, if you had  a test involving X-rays. This may slightly increase your cancer risk. The benefit of detecting cancer outweighs the slight increase in risk.  Bleeding, damage to the intestine, or infection caused by a sigmoidoscopy or colonoscopy.  A reaction to medicines given during a sigmoidoscopy or colonoscopy.  Talk with your health care provider to understand your risk for colorectal cancer and to make a screening plan that is right for you.  Questions to ask your health care provider  When should I start colorectal cancer screening?  What is my risk for colorectal cancer?  How often do I need screening?  Which screening tests do I need?  How do I get my test results?  What do my results mean?  Where to find more information  Learn more about colorectal cancer screening from:  The American Cancer Society: cancer.org  National Cancer [place]: cancer.gov  Summary  Colorectal cancer screening is a group of tests used to check for colorectal cancer before symptoms develop.  All adults who are 45-75 years old should have screening. Your health care provider may recommend screening before age 45.  You may have screening tests starting before age 45, or more often than other people, if you have certain risk factors.  Screening reduces your risk for colorectal cancer and can help identify cancer at an early stage, when the cancer can be removed or treated more easily.  Talk with your health care provider to understand your risk for colorectal cancer and to make a screening plan that is right for you.  This information is not intended to replace advice given to you by your health care provider. Make sure you discuss any questions you have with your health care provider.  Document Revised: 04/07/2021 Document Reviewed: 04/07/2021  [employer] Patient Education © 2022 [employer]

## 2022-11-14 NOTE — PROGRESS NOTES
The ABCs of the Annual Wellness Visit  Subsequent Medicare Wellness Visit    Chief Complaint  Subsequent Medicare Wellness Visit    Subjective    History of Present Illness:  Ed Villalba is a 67 y.o. male who presents for a Subsequent Medicare Wellness Visit.    Type 2 Diabetes Mellitus  He admits to intermittent polyuria.  He continues to deny any paresthesias of the feet, visual disturbances, polydipsia, hypoglycemia or foot ulcerations. Evaluation to date has been: hemoglobin A1C. Home sugars: have generally been at or near goal since last here. Current treatments - metformin and sitagliptin.     Hyperlipidemia  His compliance with treatment has been fairly good.  He is not following any particular diet but remains quite active. He is taking atorvastatin with no apparent side effects.     Essential Hypertension  Home blood pressure readings: he does not have a monitor at present.  He continues to deny any chest pain, palpitations, dyspnea, orthopnea, paroxysmal nocturnal dyspnea or peripheral edema. Current antihypertensive medications includes - lisinopril.    GERD  He remains heartburn free on pantoprazole.  There is no history of any difficulty swallowing, nausea, vomiting, change in his bowel habits, hematochezia, or melena    Urinary Frequency  He has noted a significant improvement in his urinary frequency and nocturia since last here.  He is only have a 1-2 episodes of the latter nightly at present.  He admits to occasional hesitancy but there is no history of any other difficulty voiding and he continues to deny any urgency, dysuria, or hematuria.  He admits to a several year history of increasing difficulty obtaining and maintaining erection.  He denies any problems with his bowel or bladder control and there is no history of any changes in his voice or beard growth.  There is no history of any lower extremity weakness, numbness, or tingling.  He was followed by urology in the past but remains  uninterested in returning    The following portions of the patient's history were reviewed and   updated as appropriate: allergies, current medications, past family history, past medical history, past social history, past surgical history and problem list.    Compared to one year ago, the patient feels his physical   health is the same.    Compared to one year ago, the patient feels his mental   health is the same.    Recent Hospitalizations:  He was not admitted to the hospital during the last year.     Current Medical Providers:  Patient Care Team:  Dwight Borges MD as PCP - General (Family Medicine)    Outpatient Medications Prior to Visit   Medication Sig Dispense Refill   • acetaminophen (TYLENOL) 500 MG tablet Take 2 tablets by mouth 3 (Three) Times a Day As Needed for Mild Pain . 180 tablet 5   • aspirin (Aspirin Low Dose) 81 MG EC tablet Take 1 tablet by mouth Daily. 30 tablet 5   • atorvastatin (LIPITOR) 20 MG tablet TAKE ONE TABLET BY MOUTH ONCE NIGHTLY 90 tablet 3   • cholecalciferol (VITAMIN D3) 25 MCG (1000 UT) tablet Take 1 tablet by mouth Daily. 30 tablet 5   • Diclofenac Sodium (VOLTAREN) 1 % gel gel APPLY 4 GRAMS TO AFFECTED AREA(S) FOUR TIMES A DAY AS NEEDED FOR JOINT PAIN 500 g 5   • fluticasone (Flonase) 50 MCG/ACT nasal spray 2 sprays into the nostril(s) as directed by provider Daily. Administer 2 sprays both nostrils once daily 16 g 5   • glucose blood (OneTouch Ultra) test strip 1 each by Other route Daily. Use as instructed 100 each 3   • Januvia 100 MG tablet TAKE ONE TABLET BY MOUTH DAILY 90 tablet 3   • Lancets (OneTouch Delica Plus Gnaogc65T) misc USE TO TEST ONCE DAILY 100 each 3   • lisinopril (PRINIVIL,ZESTRIL) 20 MG tablet TAKE ONE TABLET BY MOUTH ONCE NIGHTLY 90 tablet 3   • lisinopril (PRINIVIL,ZESTRIL) 30 MG tablet Take 1 tablet by mouth Every Night. 90 tablet 3   • loratadine (CLARITIN) 10 MG tablet Take 1 tablet by mouth Daily. 30 tablet 5   • meloxicam (MOBIC) 15 MG  tablet TAKE ONE TABLET BY MOUTH DAILY 30 tablet 5   • pantoprazole (PROTONIX) 40 MG EC tablet TAKE ONE TABLET BY MOUTH DAILY 30 tablet 5   • sildenafil (VIAGRA) 100 MG tablet 1/2- 1 po qd prn 10 tablet 5   • triamcinolone (KENALOG) 0.1 % lotion Apply  topically to the appropriate area as directed 2 (Two) Times a Day. to affected area 120 mL 5   • metFORMIN ER (GLUCOPHAGE-XR) 500 MG 24 hr tablet Take 1 tablet by mouth Daily. 90 tablet 5   • Zoster Vac Recomb Adjuvanted (Shingrix) 50 MCG/0.5ML reconstituted suspension Inject 0.5 mL into the appropriate muscle as directed by prescriber See Admin Instructions. Repeat in 2-6 months 1 each 1     No facility-administered medications prior to visit.     No opioid medication identified on active medication list. I have reviewed chart for other potential  high risk medication/s and harmful drug interactions in the elderly.          Aspirin is on active medication list. Aspirin use is indicated based on review of current medical condition/s. Pros and cons of this therapy have been discussed today. Benefits of this medication outweigh potential harm.  Patient has been encouraged to continue taking this medication.  .    Patient Active Problem List   Diagnosis   • Dips tobacco   • Essential hypertension   • Mixed hyperlipidemia   • Type 2 diabetes mellitus with diabetic neuropathy, without long-term current use of insulin ([facility])   • Urinary frequency   • Chronic seasonal allergic rhinitis   • Gastro-esophageal reflux disease without esophagitis   • Seborrheic dermatitis   • Healthcare maintenance   • Encounter for immunization   • Arthralgia of both hands   • Dental caries   • Other male erectile dysfunction     Advance Care Planning  Advance Directive is not on file.  ACP discussion was held with the patient during this visit. Patient does not have an advance directive, information provided.    Review of Systems   Constitutional: Positive for fatigue. Negative for appetite  "change, chills and fever.   HENT: Positive for dental problem, rhinorrhea and sneezing. Negative for congestion, ear pain, postnasal drip, sore throat and voice change.    Eyes: Negative for visual disturbance.   Respiratory: Negative for cough, shortness of breath and wheezing.    Cardiovascular: Negative for chest pain, palpitations and leg swelling.   Gastrointestinal: Negative for abdominal pain, blood in stool, constipation, diarrhea, nausea and vomiting.   Endocrine: Positive for polyuria. Negative for polydipsia.   Genitourinary: Positive for difficulty urinating (intermittent hesitancy) and frequency (with nocturia x 1-4). Negative for dysuria, hematuria and urgency.   Musculoskeletal: Positive for arthralgias. Negative for back pain, joint swelling, myalgias and neck pain.   Skin: Negative for rash.   Neurological: Negative for weakness and numbness.   Psychiatric/Behavioral: Negative for dysphoric mood and sleep disturbance. The patient is not nervous/anxious.         Objective    Vitals:    11/14/22 0919   BP: 152/76   Pulse: 63   Resp: 14   Temp: 98.6 °F (37 °C)   TempSrc: Temporal   SpO2: 97%   Weight: 85.3 kg (188 lb)   Height: 177.8 cm (70\")     Estimated body mass index is 26.98 kg/m² as calculated from the following:    Height as of this encounter: 177.8 cm (70\").    Weight as of this encounter: 85.3 kg (188 lb).    BMI is >= 25 and <30. (Overweight) The following options were offered after discussion;: exercise counseling/recommendations and nutrition counseling/recommendations      Does the patient have evidence of cognitive impairment? No    Physical Exam  Constitutional:       General: He is not in acute distress.     Appearance: Normal appearance. He is well-developed. He is not ill-appearing or diaphoretic.      Comments: Accompanied by his wife. Bright and in good spirits. No apparent distress. No pallor, jaundice, diaphoresis, or cyanosis.   HENT:      Head: Atraumatic.      Right Ear: " Tympanic membrane, ear canal and external ear normal.      Left Ear: Tympanic membrane, ear canal and external ear normal.      Mouth/Throat:      Lips: No lesions.      Mouth: Mucous membranes are moist. No oral lesions.      Dentition: Dental caries present.      Pharynx: No oropharyngeal exudate or posterior oropharyngeal erythema.   Eyes:      Conjunctiva/sclera: Conjunctivae normal.   Neck:      Thyroid: No thyroid mass or thyromegaly.      Vascular: No carotid bruit or JVD.      Trachea: Trachea normal. No tracheal deviation.   Cardiovascular:      Rate and Rhythm: Normal rate and regular rhythm.      Heart sounds: Normal heart sounds, S1 normal and S2 normal. No murmur heard.    No gallop. No S3 or S4 sounds.   Pulmonary:      Effort: Pulmonary effort is normal.      Breath sounds: Normal breath sounds.   Abdominal:      General: Bowel sounds are normal. There is no distension.      Palpations: Abdomen is soft. There is no hepatomegaly, splenomegaly or mass.      Tenderness: There is no abdominal tenderness.      Hernia: No hernia is present.   Musculoskeletal:      Right lower leg: No edema.      Left lower leg: No edema.   Lymphadenopathy:      Head:      Right side of head: No submental, submandibular, tonsillar, preauricular, posterior auricular or occipital adenopathy.      Left side of head: No submental, submandibular, tonsillar, preauricular, posterior auricular or occipital adenopathy.      Cervical: No cervical adenopathy.      Upper Body:      Right upper body: No supraclavicular adenopathy.      Left upper body: No supraclavicular adenopathy.   Skin:     General: Skin is warm and dry.      Coloration: Skin is not cyanotic, jaundiced or pale.      Findings: No rash.      Nails: There is no clubbing.   Neurological:      Mental Status: He is alert and oriented to person, place, and time.      Cranial Nerves: No cranial nerve deficit.      Sensory: Sensory deficit (decreased vibration sense toes of  both feet) present.      Motor: No tremor.      Coordination: Coordination normal.      Gait: Gait normal.      Deep Tendon Reflexes:      Reflex Scores:       Patellar reflexes are 2+ on the right side and 2+ on the left side.       Achilles reflexes are 1+ on the right side and 1+ on the left side.  Psychiatric:         Attention and Perception: Attention normal.         Mood and Affect: Mood normal.         Speech: Speech normal.         Behavior: Behavior normal.         Thought Content: Thought content normal.       HEALTH RISK ASSESSMENT    Smoking Status:  Social History     Tobacco Use   Smoking Status Never   Smokeless Tobacco Current   • Types: Snuff     Alcohol Consumption:  Social History     Substance and Sexual Activity   Alcohol Use No     Fall Risk Screen:  STEADI Fall Risk Assessment was completed, and patient is at LOW risk for falls.Assessment completed on:11/14/2022    Depression Screening:  PHQ-2/PHQ-9 Depression Screening 11/14/2022   Retired PHQ-9 Total Score -   Retired Total Score -   Little Interest or Pleasure in Doing Things 0-->not at all   Feeling Down, Depressed or Hopeless 0-->not at all   PHQ-9: Brief Depression Severity Measure Score 0     Health Habits and Functional and Cognitive Screening:  Functional & Cognitive Status 11/14/2022   Do you have difficulty preparing food and eating? No   Do you have difficulty bathing yourself, getting dressed or grooming yourself? No   Do you have difficulty using the toilet? No   Do you have difficulty moving around from place to place? No   Do you have trouble with steps or getting out of a bed or a chair? No   Current Diet Well Balanced Diet   Dental Exam Not up to date   Eye Exam Up to date   Exercise (times per week) 7 times per week   Current Exercises Include Walking   Current Exercise Activities Include -   Do you need help using the phone?  No   Are you deaf or do you have serious difficulty hearing?  No   Do you need help with  transportation? No   Do you need help shopping? No   Do you need help preparing meals?  No   Do you need help with housework?  No   Do you need help with laundry? No   Do you need help taking your medications? No   Do you need help managing money? No   Do you ever drive or ride in a car without wearing a seat belt? No   Have you felt unusual stress, anger or loneliness in the last month? No   Who do you live with? Spouse   If you need help, do you have trouble finding someone available to you? No   Have you been bothered in the last four weeks by sexual problems? No   Do you have difficulty concentrating, remembering or making decisions? No     Age-appropriate Screening Schedule:  Refer to the list below for future screening recommendations based on patient's age, sex and/or medical conditions. Orders for these recommended tests are listed in the plan section. The patient has been provided with a written plan.    Health Maintenance   Topic Date Due   • ZOSTER VACCINE (1 of 2) Never done   • DIABETIC FOOT EXAM  01/07/2020   • URINE MICROALBUMIN  02/23/2022   • HEMOGLOBIN A1C  10/04/2022   • LIPID PANEL  04/04/2023   • DIABETIC EYE EXAM  08/30/2023   • TDAP/TD VACCINES (3 - Td or Tdap) 07/23/2030   • INFLUENZA VACCINE  Completed        Assessment & Plan   CMS Preventative Services Quick Reference  Risk Factors Identified During Encounter  Immunizations Discussed/Encouraged (specific Immunizations; Influenza, Prevnar 20 (Pneumococcal 20-valent conjugate), Shingrix and COVID19  Tobacco Use/Dependance (use dotphrase .tobaccocessation for documentation)  The above risks/problems have been discussed with the patient.  Follow up actions/plans if indicated are seen below in the Assessment/Plan Section.  Pertinent information has been shared with the patient in the After Visit Summary.    Diagnoses and all orders for this visit:    1. Chronic seasonal allergic rhinitis (Primary)    2. Mixed hyperlipidemia  Encouraged to  continue to work on his diet and exercise plan.  Continue current medication  -     Comprehensive Metabolic Panel  -     Lipid Panel  -     TSH    3. Essential hypertension   Hypertension: elevated again today. Evidence of target organ damage: none.   As above  Continue current medication for now  Patient will obtain a home blood pressure monitor and start checking. He will bring his monitor and log to his return  -     CBC & Differential  -     Comprehensive Metabolic Panel  -     TSH    4. Dental caries  Will continue to try to obtain an oral surgery assessment    5. Type 2 diabetes mellitus with diabetic neuropathy, without long-term current use of insulin ([facility]CC)  Diabetes mellitus Type II, under excellent control.   Encouraged to continue to pursue ADA diet  Encouraged aerobic exercise.  Continue current medication  Updated labs drawn.  -     metFORMIN ER (GLUCOPHAGE-XR) 500 MG 24 hr tablet; Take 1 tablet by mouth Daily.  Dispense: 90 tablet; Refill: 5  -     Comprehensive Metabolic Panel  -     TSH  -     Hemoglobin A1c  -     Vitamin B12  -     Cancel: MicroAlbumin, Urine, Random - Urine, Clean Catch    6. Gastro-esophageal reflux disease without esophagitis   Symptoms are currently well controlled.  Encouraged to report if this should change.  Continue current medication  -     CBC & Differential    7. Urinary frequency  Patient remains uninterested in a urology referral  Encouraged to report if any worse or if any new symptoms or concerns.    8. Other male erectile dysfunction    9. Healthcare maintenance  Flu shot administered.  Recommended an updated bivalent COVID-19 vaccination.  Reminded that he is due for shingrix. Prescription emailed to his pharmacy.  He remains uninterested in undergoing colon cancer screening  -     Zoster Vac Recomb Adjuvanted (Shingrix) 50 MCG/0.5ML reconstituted suspension; Inject 0.5 mL into the appropriate muscle as directed by prescriber See Admin Instructions. Repeat in 2-6  months  Dispense: 1 each; Refill: 1  -     Fluzone High-Dose 65+yrs    10. Arthralgia of both hands    11. Seborrheic dermatitis    12. Dips tobacco  Lengthy discussion regarding the potential sequela of continued tobacco use and the options with respect to cessation.  Patient uninterested in pursuing at present but will consider.    Follow Up:   Return in about 15 weeks (around 2/27/2023).     An After Visit Summary and PPPS were made available to the patient.

## 2022-11-15 LAB
ALBUMIN SERPL-MCNC: 4.2 G/DL (ref 3.5–5.2)
ALBUMIN UR-MCNC: 1.3 MG/DL
ALBUMIN/GLOB SERPL: 1.4 G/DL
ALP SERPL-CCNC: 55 U/L (ref 39–117)
ALT SERPL W P-5'-P-CCNC: 15 U/L (ref 1–41)
ANION GAP SERPL CALCULATED.3IONS-SCNC: 10 MMOL/L (ref 5–15)
AST SERPL-CCNC: 23 U/L (ref 1–40)
BASOPHILS # BLD AUTO: 0.05 10*3/MM3 (ref 0–0.2)
BASOPHILS NFR BLD AUTO: 0.7 % (ref 0–1.5)
BILIRUB SERPL-MCNC: 0.4 MG/DL (ref 0–1.2)
BUN SERPL-MCNC: 9 MG/DL (ref 8–23)
BUN/CREAT SERPL: 12.3 (ref 7–25)
CALCIUM SPEC-SCNC: 9.5 MG/DL (ref 8.6–10.5)
CHLORIDE SERPL-SCNC: 105 MMOL/L (ref 98–107)
CHOLEST SERPL-MCNC: 77 MG/DL (ref 0–200)
CO2 SERPL-SCNC: 24 MMOL/L (ref 22–29)
CREAT SERPL-MCNC: 0.73 MG/DL (ref 0.76–1.27)
DEPRECATED RDW RBC AUTO: 41.1 FL (ref 37–54)
EGFRCR SERPLBLD CKD-EPI 2021: 99.7 ML/MIN/1.73
EOSINOPHIL # BLD AUTO: 0.51 10*3/MM3 (ref 0–0.4)
EOSINOPHIL NFR BLD AUTO: 7.1 % (ref 0.3–6.2)
ERYTHROCYTE [DISTWIDTH] IN BLOOD BY AUTOMATED COUNT: 13 % (ref 12.3–15.4)
GLOBULIN UR ELPH-MCNC: 2.9 GM/DL
GLUCOSE SERPL-MCNC: 94 MG/DL (ref 65–99)
HBA1C MFR BLD: 6.9 % (ref 4.8–5.6)
HCT VFR BLD AUTO: 42.3 % (ref 37.5–51)
HDLC SERPL-MCNC: 35 MG/DL (ref 40–60)
HGB BLD-MCNC: 15 G/DL (ref 13–17.7)
IMM GRANULOCYTES # BLD AUTO: 0.01 10*3/MM3 (ref 0–0.05)
IMM GRANULOCYTES NFR BLD AUTO: 0.1 % (ref 0–0.5)
LDLC SERPL CALC-MCNC: 19 MG/DL (ref 0–100)
LDLC/HDLC SERPL: 0.44 {RATIO}
LYMPHOCYTES # BLD AUTO: 2.14 10*3/MM3 (ref 0.7–3.1)
LYMPHOCYTES NFR BLD AUTO: 29.8 % (ref 19.6–45.3)
MCH RBC QN AUTO: 30.9 PG (ref 26.6–33)
MCHC RBC AUTO-ENTMCNC: 35.5 G/DL (ref 31.5–35.7)
MCV RBC AUTO: 87 FL (ref 79–97)
MONOCYTES # BLD AUTO: 0.56 10*3/MM3 (ref 0.1–0.9)
MONOCYTES NFR BLD AUTO: 7.8 % (ref 5–12)
NEUTROPHILS NFR BLD AUTO: 3.91 10*3/MM3 (ref 1.7–7)
NEUTROPHILS NFR BLD AUTO: 54.5 % (ref 42.7–76)
NRBC BLD AUTO-RTO: 0 /100 WBC (ref 0–0.2)
PLATELET # BLD AUTO: 223 10*3/MM3 (ref 140–450)
PMV BLD AUTO: 10.3 FL (ref 6–12)
POTASSIUM SERPL-SCNC: 4.1 MMOL/L (ref 3.5–5.2)
PROT SERPL-MCNC: 7.1 G/DL (ref 6–8.5)
RBC # BLD AUTO: 4.86 10*6/MM3 (ref 4.14–5.8)
SODIUM SERPL-SCNC: 139 MMOL/L (ref 136–145)
TRIGL SERPL-MCNC: 133 MG/DL (ref 0–150)
TSH SERPL DL<=0.05 MIU/L-ACNC: 1.62 UIU/ML (ref 0.27–4.2)
VIT B12 BLD-MCNC: 290 PG/ML (ref 211–946)
VLDLC SERPL-MCNC: 23 MG/DL (ref 5–40)
WBC NRBC COR # BLD: 7.18 10*3/MM3 (ref 3.4–10.8)

## 2022-11-17 DIAGNOSIS — M25.542 ARTHRALGIA OF BOTH HANDS: ICD-10-CM

## 2022-11-17 DIAGNOSIS — M25.541 ARTHRALGIA OF BOTH HANDS: ICD-10-CM

## 2022-11-18 RX ORDER — ACETAMINOPHEN 500 MG
TABLET ORAL
Qty: 180 TABLET | Refills: 5 | Status: SHIPPED | OUTPATIENT
Start: 2022-11-18 | End: 2023-02-27 | Stop reason: SDUPTHER

## 2023-01-23 DIAGNOSIS — E78.2 MIXED HYPERLIPIDEMIA: ICD-10-CM

## 2023-01-23 RX ORDER — ATORVASTATIN CALCIUM 20 MG/1
TABLET, FILM COATED ORAL
Qty: 90 TABLET | Refills: 3 | Status: SHIPPED | OUTPATIENT
Start: 2023-01-23 | End: 2023-02-27 | Stop reason: SDUPTHER

## 2023-02-19 DIAGNOSIS — L21.9 SEBORRHEIC DERMATITIS: ICD-10-CM

## 2023-02-20 RX ORDER — TRIAMCINOLONE ACETONIDE 1 MG/ML
LOTION TOPICAL
Qty: 60 ML | Refills: 5 | Status: SHIPPED | OUTPATIENT
Start: 2023-02-20

## 2023-02-26 DIAGNOSIS — N52.8 OTHER MALE ERECTILE DYSFUNCTION: ICD-10-CM

## 2023-02-27 ENCOUNTER — OFFICE VISIT (OUTPATIENT)
Dept: FAMILY MEDICINE CLINIC | Facility: CLINIC | Age: 68
End: 2023-02-27
Payer: MEDICARE

## 2023-02-27 VITALS
RESPIRATION RATE: 14 BRPM | SYSTOLIC BLOOD PRESSURE: 125 MMHG | BODY MASS INDEX: 27.2 KG/M2 | DIASTOLIC BLOOD PRESSURE: 66 MMHG | HEIGHT: 70 IN | OXYGEN SATURATION: 96 % | HEART RATE: 77 BPM | TEMPERATURE: 98.6 F | WEIGHT: 190 LBS

## 2023-02-27 DIAGNOSIS — N52.8 OTHER MALE ERECTILE DYSFUNCTION: ICD-10-CM

## 2023-02-27 DIAGNOSIS — Z00.00 HEALTHCARE MAINTENANCE: ICD-10-CM

## 2023-02-27 DIAGNOSIS — M25.542 ARTHRALGIA OF BOTH HANDS: ICD-10-CM

## 2023-02-27 DIAGNOSIS — J30.2 CHRONIC SEASONAL ALLERGIC RHINITIS: Primary | ICD-10-CM

## 2023-02-27 DIAGNOSIS — M25.541 ARTHRALGIA OF BOTH HANDS: ICD-10-CM

## 2023-02-27 DIAGNOSIS — R35.0 URINARY FREQUENCY: ICD-10-CM

## 2023-02-27 DIAGNOSIS — I10 ESSENTIAL HYPERTENSION: ICD-10-CM

## 2023-02-27 DIAGNOSIS — E11.40 TYPE 2 DIABETES MELLITUS WITH DIABETIC NEUROPATHY, WITHOUT LONG-TERM CURRENT USE OF INSULIN: ICD-10-CM

## 2023-02-27 DIAGNOSIS — K02.9 DENTAL CARIES: ICD-10-CM

## 2023-02-27 DIAGNOSIS — K21.9 GASTRO-ESOPHAGEAL REFLUX DISEASE WITHOUT ESOPHAGITIS: ICD-10-CM

## 2023-02-27 DIAGNOSIS — Z72.0 DIPS TOBACCO: ICD-10-CM

## 2023-02-27 DIAGNOSIS — E78.2 MIXED HYPERLIPIDEMIA: ICD-10-CM

## 2023-02-27 PROCEDURE — 99214 OFFICE O/P EST MOD 30 MIN: CPT | Performed by: GENERAL PRACTICE

## 2023-02-27 RX ORDER — LORATADINE 10 MG/1
10 TABLET ORAL DAILY
Qty: 90 TABLET | Refills: 3 | Status: SHIPPED | OUTPATIENT
Start: 2023-02-27

## 2023-02-27 RX ORDER — ATORVASTATIN CALCIUM 20 MG/1
20 TABLET, FILM COATED ORAL NIGHTLY
Qty: 90 TABLET | Refills: 3 | Status: SHIPPED | OUTPATIENT
Start: 2023-02-27

## 2023-02-27 RX ORDER — MELATONIN
1000 DAILY
Qty: 90 TABLET | Refills: 3 | Status: SHIPPED | OUTPATIENT
Start: 2023-02-27

## 2023-02-27 RX ORDER — ASPIRIN 81 MG/1
81 TABLET ORAL DAILY
Qty: 90 TABLET | Refills: 3 | Status: SHIPPED | OUTPATIENT
Start: 2023-02-27

## 2023-02-27 RX ORDER — FLUTICASONE PROPIONATE 50 MCG
2 SPRAY, SUSPENSION (ML) NASAL DAILY
Qty: 54 G | Refills: 3 | Status: SHIPPED | OUTPATIENT
Start: 2023-02-27

## 2023-02-27 RX ORDER — MELOXICAM 15 MG/1
15 TABLET ORAL DAILY PRN
Qty: 90 TABLET | Refills: 3 | Status: SHIPPED | OUTPATIENT
Start: 2023-02-27

## 2023-02-27 RX ORDER — PANTOPRAZOLE SODIUM 40 MG/1
40 TABLET, DELAYED RELEASE ORAL DAILY
Qty: 90 TABLET | Refills: 3 | Status: SHIPPED | OUTPATIENT
Start: 2023-02-27

## 2023-02-27 RX ORDER — LISINOPRIL 30 MG/1
30 TABLET ORAL NIGHTLY
Qty: 90 TABLET | Refills: 3 | Status: SHIPPED | OUTPATIENT
Start: 2023-02-27

## 2023-02-27 RX ORDER — SILDENAFIL 100 MG/1
TABLET, FILM COATED ORAL
Qty: 10 TABLET | Refills: 5 | Status: SHIPPED | OUTPATIENT
Start: 2023-02-27

## 2023-02-27 RX ORDER — ACETAMINOPHEN 500 MG
1000 TABLET ORAL EVERY 6 HOURS PRN
Qty: 540 TABLET | Refills: 3 | Status: SHIPPED | OUTPATIENT
Start: 2023-02-27

## 2023-02-27 NOTE — PROGRESS NOTES
Subjective   Ed Villalba is a 67 y.o. male.     Chief Complaint  He returns for a scheduled reassessment of multiple medical problems including type 2 diabetes mellitus, hyperlipidemia, essential hypertension, gastroesophageal reflux disease, and urinary frequency    History of Present Illness     Type 2 Diabetes Mellitus  He admits to intermittent polyuria.  He continues to deny any paresthesias of the feet, visual disturbances, polydipsia, hypoglycemia or foot ulcerations. Evaluation to date has been: hemoglobin A1C. Home sugars: have generally been at [facility]. Current treatments - metformin and sitagliptin.   Lab Results   Component Value Date    HGBA1C 6.90 (H) 11/14/2022     Lab Results   Component Value Date    MICROALBUR 1.3 11/14/2022     Lab Results   Component Value Date    YOQPYEEZ17 290 11/14/2022     Hyperlipidemia  His compliance with treatment has been fairly good.  He is not following any particular diet but remains quite active. He is taking atorvastatin with no apparent side effects.   Lab Results   Component Value Date    CHOL 77 11/14/2022    CHLPL 88 05/02/2016    TRIG 133 11/14/2022    HDL 35 (L) 11/14/2022    LDL 19 11/14/2022     Essential Hypertension  Home blood pressure readings: he does not have a monitor at present.  He continues to deny any chest pain, palpitations, dyspnea, orthopnea, paroxysmal nocturnal dyspnea or peripheral edema. Current antihypertensive medications includes - lisinopril.  Lab Results   Component Value Date    GLUCOSE 94 11/14/2022    BUN 9 11/14/2022    CREATININE 0.73 (L) 11/14/2022    EGFR 99.7 11/14/2022    BCR 12.3 11/14/2022    K 4.1 11/14/2022    CO2 24.0 11/14/2022    CALCIUM 9.5 11/14/2022    ALBUMIN 4.20 11/14/2022    LABIL2 1.4 (L) 05/02/2016    AST 23 11/14/2022    ALT 15 11/14/2022     Lab Results   Component Value Date    ALKPHOS 55 11/14/2022     GERD  He remains heartburn free on pantoprazole.  There is no history of any difficulty swallowing, nausea,  vomiting, change in his bowel habits, hematochezia, or melena  Lab Results   Component Value Date    WBC 7.18 11/14/2022    HGB 15.0 11/14/2022    HCT 42.3 11/14/2022    MCV 87.0 11/14/2022     11/14/2022     Urinary Frequency  He has noted a continued improvement in his urinary frequency and nocturia since last here.  He is only have a 1-2 episodes of the latter nightly at present.  He admits to occasional hesitancy but there is no history of any other difficulty voiding and he continues to deny any urgency, dysuria, or hematuria.  He admits to a several year history of increasing difficulty obtaining and maintaining erection.  He denies any problems with his bowel or bladder control and there is no history of any changes in his voice or beard growth.  There is no history of any lower extremity weakness, numbness, or tingling.  He was followed by urology in the past but remains uninterested in returning    Labs  Lab Results   Component Value Date    TSH 1.620 11/14/2022     The following portions of the patient's history were reviewed and updated as appropriate: allergies, current medications, past medical history, past social history and problem list.    Review of Systems   Constitutional: Positive for fatigue. Negative for appetite change, chills, fever and unexpected weight change.   HENT: Positive for rhinorrhea and sneezing. Negative for congestion, ear pain, postnasal drip, sinus pressure, sinus pain and sore throat.    Eyes: Negative for visual disturbance.   Respiratory: Negative for cough, shortness of breath and wheezing.    Cardiovascular: Negative for chest pain, palpitations and leg swelling.   Gastrointestinal: Negative for abdominal pain, blood in stool, constipation, diarrhea, nausea and vomiting.   Endocrine: Positive for polyuria. Negative for polydipsia.   Genitourinary: Positive for difficulty urinating (intermittent hesitancy) and frequency. Negative for dysuria, hematuria and urgency.         Nocturia × 1-2 at present, erectile dysfunction   Musculoskeletal: Positive for arthralgias. Negative for back pain and myalgias.   Skin: Negative for rash.   Neurological: Negative for weakness, numbness and headaches.   Psychiatric/Behavioral: Positive for sleep disturbance. Negative for dysphoric mood. The patient is not nervous/anxious.      Objective   Physical Exam  Constitutional:       General: He is not in acute distress.     Appearance: Normal appearance. He is well-developed. He is not ill-appearing or diaphoretic.      Comments: Accompanied by his wife. Bright and in good spirits. No apparent distress. No pallor, jaundice, diaphoresis, or cyanosis.   HENT:      Head: Atraumatic.      Right Ear: Tympanic membrane, ear canal and external ear normal.      Left Ear: Tympanic membrane, ear canal and external ear normal.      Mouth/Throat:      Lips: No lesions.      Mouth: Mucous membranes are moist. No oral lesions.      Dentition: Dental caries present.      Pharynx: No oropharyngeal exudate or posterior oropharyngeal erythema.   Eyes:      General: Lids are normal. Gaze aligned appropriately.      Extraocular Movements: Extraocular movements intact.      Conjunctiva/sclera: Conjunctivae normal.      Pupils: Pupils are equal.   Neck:      Thyroid: No thyroid mass or thyromegaly.      Vascular: No carotid bruit or JVD.      Trachea: Trachea normal. No tracheal deviation.   Cardiovascular:      Rate and Rhythm: Normal rate and regular rhythm.      Heart sounds: Normal heart sounds, S1 normal and S2 normal. No murmur heard.    No gallop. No S3 or S4 sounds.   Pulmonary:      Effort: Pulmonary effort is normal.      Breath sounds: Normal breath sounds.   Abdominal:      General: Bowel sounds are normal. There is no distension.   Musculoskeletal:      Right lower leg: No edema.      Left lower leg: No edema.   Lymphadenopathy:      Head:      Right side of head: No submental, submandibular, tonsillar,  preauricular, posterior auricular or occipital adenopathy.      Left side of head: No submental, submandibular, tonsillar, preauricular, posterior auricular or occipital adenopathy.      Cervical: No cervical adenopathy.      Upper Body:      Right upper body: No supraclavicular adenopathy.      Left upper body: No supraclavicular adenopathy.   Skin:     General: Skin is warm and dry.      Coloration: Skin is not cyanotic, jaundiced or pale.      Findings: No rash.      Nails: There is no clubbing.   Neurological:      Mental Status: He is alert and oriented to person, place, and time.      Cranial Nerves: No cranial nerve deficit, dysarthria or facial asymmetry.      Sensory: Sensory deficit (decreased vibration sense toes of both feet) present.      Motor: No tremor.      Coordination: Coordination normal.      Gait: Gait normal.   Psychiatric:         Attention and Perception: Attention normal.         Mood and Affect: Mood normal.         Speech: Speech normal.         Behavior: Behavior normal.         Thought Content: Thought content normal.       Assessment & Plan   Problems Addressed this Visit        Allergies and Adverse Reactions    Chronic seasonal allergic rhinitis     Relevant Medications    meloxicam (MOBIC) 15 MG tablet    loratadine (CLARITIN) 10 MG tablet    fluticasone (Flonase) 50 MCG/ACT nasal spray       Cardiac and Vasculature    Essential hypertension   Hypertension: at goal. Evidence of target organ damage: none.  Encouraged to continue to work on diet and exercise plan.   Continue current medication    Relevant Medications    lisinopril (PRINIVIL,ZESTRIL) 30 MG tablet    aspirin (Aspirin Low Dose) 81 MG EC tablet    Mixed hyperlipidemia  As above.   Continue current medication.    Relevant Medications    atorvastatin (LIPITOR) 20 MG tablet       ENT    Dental caries  Encouraged to follow-up with his dentist       Endocrine and Metabolic    Type 2 diabetes mellitus with diabetic neuropathy,  without long-term current use of insulin (HCC)  Diabetes mellitus Type II, under excellent control.   Encouraged to continue to pursue ADA diet  Encouraged aerobic exercise.  Continue current medication  Updated labs will be drawn at his return.    Relevant Medications    SITagliptin (Januvia) 100 MG tablet    glucose blood (OneTouch Ultra) test strip       Gastrointestinal Abdominal     Gastro-esophageal reflux disease without esophagitis   Symptoms are currently well controlled.  Encouraged to report if this should change.  Continue current medication    Relevant Medications    pantoprazole (PROTONIX) 40 MG EC tablet       Genitourinary and Reproductive     Other male erectile dysfunction    Urinary frequency  Encouraged to report if any worse or if any new symptoms or concerns.       Health Encounters    Healthcare maintenance  Recommended an updated bivalent COVID-19 vaccination.  We will discuss Prevnar 20 and Shingrix again at his return    Relevant Medications    cholecalciferol (VITAMIN D3) 25 MCG (1000 UT) tablet       Musculoskeletal and Injuries    Arthralgia of both hands    Relevant Medications    meloxicam (MOBIC) 15 MG tablet    acetaminophen (TYLENOL) 500 MG tablet       Tobacco    Dips tobacco   Diagnoses       Codes Comments    Chronic seasonal allergic rhinitis    -  Primary ICD-10-CM: J30.2  ICD-9-CM: 477.8     Mixed hyperlipidemia     ICD-10-CM: E78.2  ICD-9-CM: 272.2     Essential hypertension     ICD-10-CM: I10  ICD-9-CM: 401.9     Type 2 diabetes mellitus with diabetic neuropathy, without long-term current use of insulin (HCC)     ICD-10-CM: E11.40  ICD-9-CM: 250.60, 357.2     Gastro-esophageal reflux disease without esophagitis     ICD-10-CM: K21.9  ICD-9-CM: 530.81     Urinary frequency     ICD-10-CM: R35.0  ICD-9-CM: 788.41     Other male erectile dysfunction     ICD-10-CM: N52.8  ICD-9-CM: 607.84     Healthcare maintenance     ICD-10-CM: Z00.00  ICD-9-CM: V70.0     Dips tobacco      ICD-10-CM: Z72.0  ICD-9-CM: 305.1     Dental caries     ICD-10-CM: K02.9  ICD-9-CM: 521.00     Arthralgia of both hands     ICD-10-CM: M25.541, M25.542  ICD-9-CM: 719.44

## 2023-03-04 DIAGNOSIS — E11.40 TYPE 2 DIABETES MELLITUS WITH DIABETIC NEUROPATHY, WITHOUT LONG-TERM CURRENT USE OF INSULIN: ICD-10-CM

## 2023-03-06 RX ORDER — SITAGLIPTIN 100 MG/1
TABLET, FILM COATED ORAL
Qty: 90 TABLET | Refills: 3 | Status: SHIPPED | OUTPATIENT
Start: 2023-03-06

## 2023-04-10 DIAGNOSIS — M25.542 ARTHRALGIA OF BOTH HANDS: ICD-10-CM

## 2023-04-10 DIAGNOSIS — M25.541 ARTHRALGIA OF BOTH HANDS: ICD-10-CM

## 2023-06-01 ENCOUNTER — OFFICE VISIT (OUTPATIENT)
Dept: FAMILY MEDICINE CLINIC | Facility: CLINIC | Age: 68
End: 2023-06-01

## 2023-06-01 VITALS
HEART RATE: 95 BPM | OXYGEN SATURATION: 98 % | BODY MASS INDEX: 26.48 KG/M2 | DIASTOLIC BLOOD PRESSURE: 70 MMHG | SYSTOLIC BLOOD PRESSURE: 126 MMHG | RESPIRATION RATE: 14 BRPM | TEMPERATURE: 98.6 F | HEIGHT: 70 IN | WEIGHT: 185 LBS

## 2023-06-01 DIAGNOSIS — J30.2 CHRONIC SEASONAL ALLERGIC RHINITIS: Primary | ICD-10-CM

## 2023-06-01 DIAGNOSIS — N52.8 OTHER MALE ERECTILE DYSFUNCTION: ICD-10-CM

## 2023-06-01 DIAGNOSIS — L21.9 SEBORRHEIC DERMATITIS: ICD-10-CM

## 2023-06-01 DIAGNOSIS — Z72.0 DIPS TOBACCO: ICD-10-CM

## 2023-06-01 DIAGNOSIS — E78.2 MIXED HYPERLIPIDEMIA: ICD-10-CM

## 2023-06-01 DIAGNOSIS — Z00.00 HEALTHCARE MAINTENANCE: ICD-10-CM

## 2023-06-01 DIAGNOSIS — K21.9 GASTRO-ESOPHAGEAL REFLUX DISEASE WITHOUT ESOPHAGITIS: ICD-10-CM

## 2023-06-01 DIAGNOSIS — I10 ESSENTIAL HYPERTENSION: ICD-10-CM

## 2023-06-01 DIAGNOSIS — M25.541 ARTHRALGIA OF BOTH HANDS: ICD-10-CM

## 2023-06-01 DIAGNOSIS — Z23 ENCOUNTER FOR IMMUNIZATION: ICD-10-CM

## 2023-06-01 DIAGNOSIS — M25.542 ARTHRALGIA OF BOTH HANDS: ICD-10-CM

## 2023-06-01 DIAGNOSIS — R35.0 URINARY FREQUENCY: ICD-10-CM

## 2023-06-01 DIAGNOSIS — E11.40 TYPE 2 DIABETES MELLITUS WITH DIABETIC NEUROPATHY, WITHOUT LONG-TERM CURRENT USE OF INSULIN: ICD-10-CM

## 2023-06-01 PROCEDURE — 83036 HEMOGLOBIN GLYCOSYLATED A1C: CPT | Performed by: GENERAL PRACTICE

## 2023-06-01 PROCEDURE — 80053 COMPREHEN METABOLIC PANEL: CPT | Performed by: GENERAL PRACTICE

## 2023-06-01 PROCEDURE — 84443 ASSAY THYROID STIM HORMONE: CPT | Performed by: GENERAL PRACTICE

## 2023-06-01 PROCEDURE — 80061 LIPID PANEL: CPT | Performed by: GENERAL PRACTICE

## 2023-06-01 PROCEDURE — 82607 VITAMIN B-12: CPT | Performed by: GENERAL PRACTICE

## 2023-06-01 PROCEDURE — 82043 UR ALBUMIN QUANTITATIVE: CPT | Performed by: GENERAL PRACTICE

## 2023-06-01 PROCEDURE — 85025 COMPLETE CBC W/AUTO DIFF WBC: CPT | Performed by: GENERAL PRACTICE

## 2023-06-01 RX ORDER — PANTOPRAZOLE SODIUM 40 MG/1
40 TABLET, DELAYED RELEASE ORAL DAILY
Qty: 90 TABLET | Refills: 3 | Status: SHIPPED | OUTPATIENT
Start: 2023-06-01

## 2023-06-01 RX ORDER — ASPIRIN 81 MG/1
81 TABLET ORAL DAILY
Qty: 90 TABLET | Refills: 3 | Status: SHIPPED | OUTPATIENT
Start: 2023-06-01

## 2023-06-01 RX ORDER — MELOXICAM 15 MG/1
15 TABLET ORAL DAILY PRN
Qty: 90 TABLET | Refills: 3 | Status: SHIPPED | OUTPATIENT
Start: 2023-06-01

## 2023-06-01 RX ORDER — FLUTICASONE PROPIONATE 50 MCG
2 SPRAY, SUSPENSION (ML) NASAL DAILY
Qty: 54 G | Refills: 3 | Status: SHIPPED | OUTPATIENT
Start: 2023-06-01

## 2023-06-01 RX ORDER — ATORVASTATIN CALCIUM 20 MG/1
20 TABLET, FILM COATED ORAL NIGHTLY
Qty: 90 TABLET | Refills: 3 | Status: SHIPPED | OUTPATIENT
Start: 2023-06-01

## 2023-06-01 RX ORDER — METFORMIN HYDROCHLORIDE 500 MG/1
500 TABLET, EXTENDED RELEASE ORAL DAILY
Qty: 90 TABLET | Refills: 5 | Status: SHIPPED | OUTPATIENT
Start: 2023-06-01

## 2023-06-01 RX ORDER — LORATADINE 10 MG/1
10 TABLET ORAL DAILY
Qty: 90 TABLET | Refills: 3 | Status: SHIPPED | OUTPATIENT
Start: 2023-06-01

## 2023-06-01 RX ORDER — SILDENAFIL 100 MG/1
TABLET, FILM COATED ORAL
Qty: 10 TABLET | Refills: 5 | Status: SHIPPED | OUTPATIENT
Start: 2023-06-01

## 2023-06-01 RX ORDER — LISINOPRIL 30 MG/1
30 TABLET ORAL NIGHTLY
Qty: 90 TABLET | Refills: 3 | Status: SHIPPED | OUTPATIENT
Start: 2023-06-01

## 2023-06-01 NOTE — PROGRESS NOTES
Subjective   Ed Villalba is a 68 y.o. male.     Chief Complaint  He returns for a scheduled reassessment of multiple medical problems including type 2 diabetes mellitus, hyperlipidemia, essential hypertension, gastroesophageal reflux disease, and urinary frequency    History of Present Illness     Type 2 Diabetes Mellitus  He admits to intermittent polyuria.  He continues to deny any paresthesias of the feet, visual disturbances, polydipsia, hypoglycemia or foot ulcerations. Evaluation to date has been: hemoglobin A1C. Home sugars: have remained at goal.  He appears to be taking metformin and sitagliptin as prescribed.     Hyperlipidemia  His compliance with treatment has been fairly good.  He is not following any particular diet but remains quite active. He is taking atorvastatin with no apparent side effects.     Essential Hypertension  Home blood pressure readings: he does not check.  He continues to deny any chest pain, palpitations, dyspnea, orthopnea, paroxysmal nocturnal dyspnea or peripheral edema.  He remains on lisinopril.    GERD  He remains heartburn free on pantoprazole.  There is no history of any difficulty swallowing, nausea, vomiting, change in his bowel habits, hematochezia, or melena    Urinary Frequency  He has noted a continued improvement in his urinary frequency and nocturia.  He is only have a 1-2 episodes of the latter nightly at present.  He admits to occasional hesitancy but there is no history of any other difficulty voiding and he continues to deny any urgency, dysuria, or hematuria.  He admits to a several year history of increasing difficulty obtaining and maintaining erection.  He denies any problems with his bowel or bladder control and there is no history of any changes in his voice or beard growth.  There is no history of any lower extremity weakness, numbness, or tingling.  He was followed by urology in the past but remains uninterested in returning    The following portions of  the patient's history were reviewed and updated as appropriate: allergies, current medications, past medical history, past social history and problem list.    Review of Systems   Constitutional: Positive for fatigue. Negative for appetite change, chills, fever and unexpected weight change.   HENT: Positive for rhinorrhea and sneezing. Negative for congestion, ear pain, postnasal drip, sinus pressure, sinus pain and sore throat.    Eyes: Negative for visual disturbance.   Respiratory: Negative for cough, shortness of breath and wheezing.    Cardiovascular: Negative for chest pain, palpitations and leg swelling.   Gastrointestinal: Negative for abdominal pain, blood in stool, constipation, diarrhea, nausea and vomiting.   Endocrine: Positive for polyuria. Negative for polydipsia.   Genitourinary: Positive for difficulty urinating (intermittent hesitancy) and frequency. Negative for dysuria, hematuria and urgency.        Nocturia × 1-2 at present, erectile dysfunction   Musculoskeletal: Positive for arthralgias. Negative for back pain and myalgias.   Skin: Negative for rash.   Neurological: Negative for weakness, numbness and headaches.   Psychiatric/Behavioral: Positive for sleep disturbance. Negative for dysphoric mood. The patient is not nervous/anxious.      Objective   Physical Exam  Constitutional:       General: He is not in acute distress.     Appearance: Normal appearance. He is well-developed. He is not ill-appearing or diaphoretic.      Comments: Accompanied by his wife. Bright and in good spirits. No apparent distress. No pallor, jaundice, diaphoresis, or cyanosis.   HENT:      Head: Atraumatic.      Right Ear: Tympanic membrane, ear canal and external ear normal.      Left Ear: Tympanic membrane, ear canal and external ear normal.      Mouth/Throat:      Lips: No lesions.      Mouth: Mucous membranes are moist. No oral lesions.      Dentition: Dental caries present.      Pharynx: No oropharyngeal exudate  or posterior oropharyngeal erythema.   Eyes:      General: Lids are normal. Gaze aligned appropriately.      Extraocular Movements: Extraocular movements intact.      Conjunctiva/sclera: Conjunctivae normal.      Pupils: Pupils are equal.   Neck:      Thyroid: No thyroid mass or thyromegaly.      Vascular: No carotid bruit or JVD.      Trachea: Trachea normal. No tracheal deviation.   Cardiovascular:      Rate and Rhythm: Normal rate and regular rhythm.      Heart sounds: Normal heart sounds, S1 normal and S2 normal. No murmur heard.    No gallop. No S3 or S4 sounds.   Pulmonary:      Effort: Pulmonary effort is normal.      Breath sounds: Normal breath sounds.   Abdominal:      General: Bowel sounds are normal. There is no distension.   Musculoskeletal:      Right lower leg: No edema.      Left lower leg: No edema.   Lymphadenopathy:      Head:      Right side of head: No submental, submandibular, tonsillar, preauricular, posterior auricular or occipital adenopathy.      Left side of head: No submental, submandibular, tonsillar, preauricular, posterior auricular or occipital adenopathy.      Cervical: No cervical adenopathy.      Upper Body:      Right upper body: No supraclavicular adenopathy.      Left upper body: No supraclavicular adenopathy.   Skin:     General: Skin is warm and dry.      Coloration: Skin is not cyanotic, jaundiced or pale.      Findings: No rash.      Nails: There is no clubbing.   Neurological:      Mental Status: He is alert and oriented to person, place, and time.      Cranial Nerves: No cranial nerve deficit, dysarthria or facial asymmetry.      Sensory: Sensory deficit (decreased vibration sense toes of both feet) present.      Motor: No tremor.      Coordination: Coordination normal.      Gait: Gait normal.   Psychiatric:         Attention and Perception: Attention normal.         Mood and Affect: Mood normal.         Speech: Speech normal.         Behavior: Behavior normal.          Thought Content: Thought content normal.       Assessment & Plan   Problems Addressed this Visit        Allergies and Adverse Reactions    Chronic seasonal allergic rhinitis   Continue current medication  Encouraged to report if any worse or if any new symptoms or concerns.    Relevant Medications    meloxicam (MOBIC) 15 MG tablet    loratadine (CLARITIN) 10 MG tablet    fluticasone (Flonase) 50 MCG/ACT nasal spray       Cardiac and Vasculature    Essential hypertension   Hypertension: at goal. Evidence of target organ damage: none.  Encouraged to continue to work on diet and exercise plan.   Continue current medication    Relevant Medications    lisinopril (PRINIVIL,ZESTRIL) 30 MG tablet    aspirin 81 MG EC tablet    Other Relevant Orders    CBC & Differential    Comprehensive Metabolic Panel    TSH    Mixed hyperlipidemia  As above.   Continue current medication.    Relevant Medications    atorvastatin (LIPITOR) 20 MG tablet    Other Relevant Orders    Comprehensive Metabolic Panel    Lipid Panel    TSH       Endocrine and Metabolic    Type 2 diabetes mellitus with diabetic neuropathy, without long-term current use of insulin  Diabetes mellitus Type II, under excellent control.   Encouraged to continue to pursue ADA diet  Encouraged aerobic exercise.  Continue current medication  Updated labs drawn.    Relevant Medications    metFORMIN ER (GLUCOPHAGE-XR) 500 MG 24 hr tablet    SITagliptin (Januvia) 100 MG tablet    Other Relevant Orders    Comprehensive Metabolic Panel    TSH    Hemoglobin A1c    MicroAlbumin, Urine, Random - Urine, Clean Catch    Vitamin B12       Gastrointestinal Abdominal     Gastro-esophageal reflux disease without esophagitis    Relevant Medications    pantoprazole (PROTONIX) 40 MG EC tablet       Genitourinary and Reproductive     Other male erectile dysfunction    Relevant Medications    sildenafil (VIAGRA) 100 MG tablet    Other Relevant Orders    TSH    Urinary frequency  Encouraged to  report if any worse or if any new symptoms or concerns.       Health Encounters    Healthcare maintenance  Prevnar 20 administered  Recommended an updated bivalent COVID-19 vaccination.  Reminded that he is due for Shingrix    Relevant Orders    Pneumococcal Conjugate Vaccine 20-Valent All (Completed)       Musculoskeletal and Injuries    Arthralgia of both hands    Relevant Medications    meloxicam (MOBIC) 15 MG tablet    Diclofenac Sodium (VOLTAREN) 1 % gel gel    Other Relevant Orders    CBC & Differential       Skin    Seborrheic dermatitis    Relevant Medications    Diclofenac Sodium (VOLTAREN) 1 % gel gel       Tobacco    Dips tobacco                      Diagnoses       Codes Comments    Chronic seasonal allergic rhinitis    -  Primary ICD-10-CM: J30.2  ICD-9-CM: 477.8     Essential hypertension     ICD-10-CM: I10  ICD-9-CM: 401.9     Mixed hyperlipidemia     ICD-10-CM: E78.2  ICD-9-CM: 272.2     Type 2 diabetes mellitus with diabetic neuropathy, without long-term current use of insulin     ICD-10-CM: E11.40  ICD-9-CM: 250.60, 357.2     Gastro-esophageal reflux disease without esophagitis     ICD-10-CM: K21.9  ICD-9-CM: 530.81     Urinary frequency     ICD-10-CM: R35.0  ICD-9-CM: 788.41     Other male erectile dysfunction     ICD-10-CM: N52.8  ICD-9-CM: 607.84     Healthcare maintenance     ICD-10-CM: Z00.00  ICD-9-CM: V70.0     Arthralgia of both hands     ICD-10-CM: M25.541, M25.542  ICD-9-CM: 719.44     Dips tobacco     ICD-10-CM: Z72.0  ICD-9-CM: 305.1     Encounter for immunization     ICD-10-CM: Z23  ICD-9-CM: V03.89     Seborrheic dermatitis     ICD-10-CM: L21.9  ICD-9-CM: 690.10

## 2023-06-01 NOTE — PROGRESS NOTES
Injection  Injection performed in left deltoid by Mary Lou Sung MA. Patient tolerated the procedure well without complications.  06/01/23   Mary Lou Sung MA

## 2023-06-02 LAB
ALBUMIN SERPL-MCNC: 4.2 G/DL (ref 3.5–5.2)
ALBUMIN UR-MCNC: 9.3 MG/DL
ALBUMIN/GLOB SERPL: 1.4 G/DL
ALP SERPL-CCNC: 54 U/L (ref 39–117)
ALT SERPL W P-5'-P-CCNC: 18 U/L (ref 1–41)
ANION GAP SERPL CALCULATED.3IONS-SCNC: 11 MMOL/L (ref 5–15)
AST SERPL-CCNC: 24 U/L (ref 1–40)
BASOPHILS # BLD AUTO: 0.06 10*3/MM3 (ref 0–0.2)
BASOPHILS NFR BLD AUTO: 0.7 % (ref 0–1.5)
BILIRUB SERPL-MCNC: 0.3 MG/DL (ref 0–1.2)
BUN SERPL-MCNC: 17 MG/DL (ref 8–23)
BUN/CREAT SERPL: 18.3 (ref 7–25)
CALCIUM SPEC-SCNC: 9.2 MG/DL (ref 8.6–10.5)
CHLORIDE SERPL-SCNC: 108 MMOL/L (ref 98–107)
CHOLEST SERPL-MCNC: 82 MG/DL (ref 0–200)
CO2 SERPL-SCNC: 22 MMOL/L (ref 22–29)
CREAT SERPL-MCNC: 0.93 MG/DL (ref 0.76–1.27)
DEPRECATED RDW RBC AUTO: 44.5 FL (ref 37–54)
EGFRCR SERPLBLD CKD-EPI 2021: 89.4 ML/MIN/1.73
EOSINOPHIL # BLD AUTO: 0.58 10*3/MM3 (ref 0–0.4)
EOSINOPHIL NFR BLD AUTO: 6.6 % (ref 0.3–6.2)
ERYTHROCYTE [DISTWIDTH] IN BLOOD BY AUTOMATED COUNT: 13.9 % (ref 12.3–15.4)
GLOBULIN UR ELPH-MCNC: 2.9 GM/DL
GLUCOSE SERPL-MCNC: 137 MG/DL (ref 65–99)
HBA1C MFR BLD: 7.8 % (ref 4.8–5.6)
HCT VFR BLD AUTO: 42 % (ref 37.5–51)
HDLC SERPL-MCNC: 37 MG/DL (ref 40–60)
HGB BLD-MCNC: 14.5 G/DL (ref 13–17.7)
IMM GRANULOCYTES # BLD AUTO: 0.02 10*3/MM3 (ref 0–0.05)
IMM GRANULOCYTES NFR BLD AUTO: 0.2 % (ref 0–0.5)
LDLC SERPL CALC-MCNC: 29 MG/DL (ref 0–100)
LDLC/HDLC SERPL: 0.8 {RATIO}
LYMPHOCYTES # BLD AUTO: 1.91 10*3/MM3 (ref 0.7–3.1)
LYMPHOCYTES NFR BLD AUTO: 21.9 % (ref 19.6–45.3)
MCH RBC QN AUTO: 30.9 PG (ref 26.6–33)
MCHC RBC AUTO-ENTMCNC: 34.5 G/DL (ref 31.5–35.7)
MCV RBC AUTO: 89.6 FL (ref 79–97)
MONOCYTES # BLD AUTO: 0.58 10*3/MM3 (ref 0.1–0.9)
MONOCYTES NFR BLD AUTO: 6.6 % (ref 5–12)
NEUTROPHILS NFR BLD AUTO: 5.58 10*3/MM3 (ref 1.7–7)
NEUTROPHILS NFR BLD AUTO: 64 % (ref 42.7–76)
NRBC BLD AUTO-RTO: 0 /100 WBC (ref 0–0.2)
PLATELET # BLD AUTO: 194 10*3/MM3 (ref 140–450)
PMV BLD AUTO: 10.1 FL (ref 6–12)
POTASSIUM SERPL-SCNC: 4 MMOL/L (ref 3.5–5.2)
PROT SERPL-MCNC: 7.1 G/DL (ref 6–8.5)
RBC # BLD AUTO: 4.69 10*6/MM3 (ref 4.14–5.8)
SODIUM SERPL-SCNC: 141 MMOL/L (ref 136–145)
TRIGL SERPL-MCNC: 77 MG/DL (ref 0–150)
TSH SERPL DL<=0.05 MIU/L-ACNC: 1.04 UIU/ML (ref 0.27–4.2)
VIT B12 BLD-MCNC: 316 PG/ML (ref 211–946)
VLDLC SERPL-MCNC: 16 MG/DL (ref 5–40)
WBC NRBC COR # BLD: 8.73 10*3/MM3 (ref 3.4–10.8)

## 2023-08-26 DIAGNOSIS — M25.541 ARTHRALGIA OF BOTH HANDS: ICD-10-CM

## 2023-08-26 DIAGNOSIS — M25.542 ARTHRALGIA OF BOTH HANDS: ICD-10-CM

## 2023-08-28 RX ORDER — ACETAMINOPHEN 500 MG
TABLET ORAL
Qty: 180 TABLET | Refills: 5 | Status: SHIPPED | OUTPATIENT
Start: 2023-08-28

## 2023-09-12 ENCOUNTER — OFFICE VISIT (OUTPATIENT)
Dept: FAMILY MEDICINE CLINIC | Facility: CLINIC | Age: 68
End: 2023-09-12
Payer: MEDICARE

## 2023-09-12 DIAGNOSIS — M25.542 ARTHRALGIA OF BOTH HANDS: ICD-10-CM

## 2023-09-12 DIAGNOSIS — Z72.0 DIPS TOBACCO: ICD-10-CM

## 2023-09-12 DIAGNOSIS — K21.9 GASTRO-ESOPHAGEAL REFLUX DISEASE WITHOUT ESOPHAGITIS: ICD-10-CM

## 2023-09-12 DIAGNOSIS — M25.541 ARTHRALGIA OF BOTH HANDS: ICD-10-CM

## 2023-09-12 DIAGNOSIS — E11.40 TYPE 2 DIABETES MELLITUS WITH DIABETIC NEUROPATHY, WITHOUT LONG-TERM CURRENT USE OF INSULIN: Primary | ICD-10-CM

## 2023-09-12 DIAGNOSIS — Z00.00 HEALTHCARE MAINTENANCE: ICD-10-CM

## 2023-09-12 DIAGNOSIS — Z23 ENCOUNTER FOR IMMUNIZATION: ICD-10-CM

## 2023-09-12 DIAGNOSIS — I10 ESSENTIAL HYPERTENSION: ICD-10-CM

## 2023-09-12 DIAGNOSIS — N52.8 OTHER MALE ERECTILE DYSFUNCTION: ICD-10-CM

## 2023-09-12 DIAGNOSIS — J30.2 CHRONIC SEASONAL ALLERGIC RHINITIS: ICD-10-CM

## 2023-09-12 DIAGNOSIS — R35.0 URINARY FREQUENCY: ICD-10-CM

## 2023-09-12 DIAGNOSIS — E78.2 MIXED HYPERLIPIDEMIA: ICD-10-CM

## 2023-09-12 LAB
EXPIRATION DATE: NORMAL
HBA1C MFR BLD: 7 %
Lab: NORMAL

## 2023-09-12 NOTE — PROGRESS NOTES
Subjective   Ed Villalba is a 68 y.o. male.     Chief Complaint  He returns for a scheduled reassessment of multiple medical problems including type 2 diabetes mellitus, hyperlipidemia, essential hypertension, gastroesophageal reflux disease, and urinary frequency    History of Present Illness     Type 2 Diabetes Mellitus  He admits to intermittent polyuria.  He continues to deny any polydipsia, paresthesias of the feet, visual disturbances, hypoglycemia or foot ulcerations. Evaluation to date has been: hemoglobin A1C. Home sugars: have remained at goal.  He appears to be taking metformin and sitagliptin as prescribed.   Lab Results   Component Value Date    HGBA1C 7.0 09/12/2023     Hyperlipidemia  His compliance with treatment has been fairly good.  He is not following any particular diet but remains quite active. He is taking atorvastatin with no apparent side effects.     Essential Hypertension  He continues to deny any chest pain, palpitations, dyspnea, orthopnea, paroxysmal nocturnal dyspnea or peripheral edema.  He is taking lisinopril with no apparent side effects    GERD  He remains heartburn free on pantoprazole.  There is no history of any difficulty swallowing, nausea, vomiting, change in his bowel habits, hematochezia, or melena    Urinary Frequency  He has noted a continued improvement in his urinary frequency and nocturia.  He is only have a 1-2 episodes of the latter nightly at present.  He admits to occasional hesitancy but there is no history of any other difficulty voiding and he continues to deny any urgency, dysuria, or hematuria.  He admits to a several year history of increasing difficulty obtaining and maintaining erection.  He denies any problems with his bowel or bladder control and there is no history of any changes in his voice or beard growth.  There is no history of any lower extremity weakness, numbness, or tingling.  He was followed by urology in the past but remains uninterested in  returning    The following portions of the patient's history were reviewed and updated as appropriate: allergies, current medications, past medical history, past social history, and problem list.    Review of Systems   Constitutional:  Positive for fatigue. Negative for appetite change, chills, fever and unexpected weight change.   HENT:  Positive for rhinorrhea and sneezing. Negative for congestion, ear pain, postnasal drip, sinus pressure, sinus pain and sore throat.    Eyes:  Negative for visual disturbance.   Respiratory:  Negative for cough, shortness of breath and wheezing.    Cardiovascular:  Negative for chest pain, palpitations and leg swelling.   Gastrointestinal:  Negative for abdominal pain, blood in stool, constipation, diarrhea, nausea and vomiting.   Endocrine: Positive for polyuria. Negative for polydipsia.   Genitourinary:  Positive for difficulty urinating (intermittent hesitancy) and frequency. Negative for dysuria, hematuria and urgency.        Nocturia × 1-2 at present, erectile dysfunction   Musculoskeletal:  Positive for arthralgias. Negative for back pain and myalgias.   Skin:  Negative for rash.   Neurological:  Negative for weakness, numbness and headaches.   Psychiatric/Behavioral:  Positive for sleep disturbance. Negative for dysphoric mood. The patient is not nervous/anxious.      Objective   Physical Exam  Constitutional:       General: He is not in acute distress.     Appearance: Normal appearance. He is well-developed. He is not ill-appearing or diaphoretic.      Comments: Accompanied by his wife. Bright and in good spirits. No apparent distress. No pallor, jaundice, diaphoresis, or cyanosis.   HENT:      Head: Atraumatic.      Right Ear: Tympanic membrane, ear canal and external ear normal.      Left Ear: Tympanic membrane, ear canal and external ear normal.      Mouth/Throat:      Lips: No lesions.      Mouth: Mucous membranes are moist. No oral lesions.      Dentition: Dental  caries present.      Pharynx: No oropharyngeal exudate or posterior oropharyngeal erythema.   Eyes:      General: Lids are normal. Gaze aligned appropriately.      Extraocular Movements: Extraocular movements intact.      Conjunctiva/sclera: Conjunctivae normal.      Pupils: Pupils are equal.   Neck:      Thyroid: No thyroid mass or thyromegaly.      Vascular: No carotid bruit or JVD.      Trachea: Trachea normal. No tracheal deviation.   Cardiovascular:      Rate and Rhythm: Normal rate and regular rhythm.      Heart sounds: Normal heart sounds, S1 normal and S2 normal. No murmur heard.    No gallop. No S3 or S4 sounds.   Pulmonary:      Effort: Pulmonary effort is normal.      Breath sounds: Normal breath sounds.   Abdominal:      General: Bowel sounds are normal. There is no distension.   Musculoskeletal:      Right lower leg: No edema.      Left lower leg: No edema.   Lymphadenopathy:      Head:      Right side of head: No submental, submandibular, tonsillar, preauricular, posterior auricular or occipital adenopathy.      Left side of head: No submental, submandibular, tonsillar, preauricular, posterior auricular or occipital adenopathy.      Cervical: No cervical adenopathy.      Upper Body:      Right upper body: No supraclavicular adenopathy.      Left upper body: No supraclavicular adenopathy.   Skin:     General: Skin is warm and dry.      Coloration: Skin is not cyanotic, jaundiced or pale.      Findings: No rash.      Nails: There is no clubbing.   Neurological:      Mental Status: He is alert and oriented to person, place, and time.      Cranial Nerves: No cranial nerve deficit, dysarthria or facial asymmetry.      Sensory: Sensory deficit (decreased vibration sense toes of both feet) present.      Motor: No tremor.      Coordination: Coordination normal.      Gait: Gait normal.   Psychiatric:         Attention and Perception: Attention normal.         Mood and Affect: Mood normal.         Speech: Speech  normal.         Behavior: Behavior normal.         Thought Content: Thought content normal.     Assessment & Plan   Problems Addressed this Visit          Allergies and Adverse Reactions    Chronic seasonal allergic rhinitis       Cardiac and Vasculature    Essential hypertension   Hypertension: at goal. Evidence of target organ damage: none.  Encouraged to continue to work on diet and exercise plan.   Continue current medication    Mixed hyperlipidemia  As above.   Continue current medication.       Endocrine and Metabolic    Type 2 diabetes mellitus with diabetic neuropathy, without long-term current use of insulin  Diabetes mellitus Type II, under good control.   Encouraged to continue to pursue ADA diet  Encouraged aerobic exercise.  Continue current medication  Referral will be made for an updated diabetic eye exam  Update labs will be drawn at his return    Relevant Orders    POC Glycosylated Hemoglobin (Hb A1C) (Completed)       Gastrointestinal Abdominal     Gastro-esophageal reflux disease without esophagitis   Symptoms are currently well controlled.  Encouraged to report if this should change.  Continue current medication       Genitourinary and Reproductive     Other male erectile dysfunction    Urinary frequency  Encouraged to report if any worse or if any new symptoms or concerns.       Health Encounters    Healthcare maintenance  Flu shot administered.  Recommended an updated COVID-19 vaccination.  Reviewed the potential benefits of RSV vaccination.  Reminded that he is due for Shingrix  He remains uninterested in colon or prostate cancer screening    Relevant Orders    Fluzone High-Dose 65+yrs (Completed)       Musculoskeletal and Injuries    Arthralgia of both hands       Tobacco    Dips tobacco                        Diagnoses         Codes Comments    Type 2 diabetes mellitus with diabetic neuropathy, without long-term current use of insulin    -  Primary ICD-10-CM: E11.40  ICD-9-CM: 250.60, 357.2      Chronic seasonal allergic rhinitis     ICD-10-CM: J30.2  ICD-9-CM: 477.8     Essential hypertension     ICD-10-CM: I10  ICD-9-CM: 401.9     Mixed hyperlipidemia     ICD-10-CM: E78.2  ICD-9-CM: 272.2     Gastro-esophageal reflux disease without esophagitis     ICD-10-CM: K21.9  ICD-9-CM: 530.81     Other male erectile dysfunction     ICD-10-CM: N52.8  ICD-9-CM: 607.84     Urinary frequency     ICD-10-CM: R35.0  ICD-9-CM: 788.41     Healthcare maintenance     ICD-10-CM: Z00.00  ICD-9-CM: V70.0     Arthralgia of both hands     ICD-10-CM: M25.541, M25.542  ICD-9-CM: 719.44     Dips tobacco     ICD-10-CM: Z72.0  ICD-9-CM: 305.1     Encounter for immunization     ICD-10-CM: Z23  ICD-9-CM: V03.89

## 2023-09-13 VITALS
RESPIRATION RATE: 14 BRPM | WEIGHT: 186 LBS | TEMPERATURE: 98.6 F | SYSTOLIC BLOOD PRESSURE: 132 MMHG | HEIGHT: 70 IN | OXYGEN SATURATION: 98 % | HEART RATE: 86 BPM | DIASTOLIC BLOOD PRESSURE: 75 MMHG | BODY MASS INDEX: 26.63 KG/M2

## 2023-11-28 ENCOUNTER — OFFICE VISIT (OUTPATIENT)
Dept: FAMILY MEDICINE CLINIC | Facility: CLINIC | Age: 68
End: 2023-11-28
Payer: MEDICARE

## 2023-11-28 DIAGNOSIS — R35.0 URINARY FREQUENCY: ICD-10-CM

## 2023-11-28 DIAGNOSIS — K02.9 DENTAL CARIES: ICD-10-CM

## 2023-11-28 DIAGNOSIS — N52.8 OTHER MALE ERECTILE DYSFUNCTION: ICD-10-CM

## 2023-11-28 DIAGNOSIS — L21.9 SEBORRHEIC DERMATITIS: ICD-10-CM

## 2023-11-28 DIAGNOSIS — E78.2 MIXED HYPERLIPIDEMIA: ICD-10-CM

## 2023-11-28 DIAGNOSIS — J30.2 CHRONIC SEASONAL ALLERGIC RHINITIS: Primary | ICD-10-CM

## 2023-11-28 DIAGNOSIS — E11.40 TYPE 2 DIABETES MELLITUS WITH DIABETIC NEUROPATHY, WITHOUT LONG-TERM CURRENT USE OF INSULIN: ICD-10-CM

## 2023-11-28 DIAGNOSIS — I10 ESSENTIAL HYPERTENSION: ICD-10-CM

## 2023-11-28 DIAGNOSIS — Z00.00 HEALTHCARE MAINTENANCE: ICD-10-CM

## 2023-11-28 DIAGNOSIS — Z72.0 DIPS TOBACCO: ICD-10-CM

## 2023-11-28 DIAGNOSIS — K21.9 GASTRO-ESOPHAGEAL REFLUX DISEASE WITHOUT ESOPHAGITIS: ICD-10-CM

## 2023-11-28 PROCEDURE — 85025 COMPLETE CBC W/AUTO DIFF WBC: CPT | Performed by: GENERAL PRACTICE

## 2023-11-28 PROCEDURE — 80053 COMPREHEN METABOLIC PANEL: CPT | Performed by: GENERAL PRACTICE

## 2023-11-28 PROCEDURE — 80061 LIPID PANEL: CPT | Performed by: GENERAL PRACTICE

## 2023-11-28 PROCEDURE — 83036 HEMOGLOBIN GLYCOSYLATED A1C: CPT | Performed by: GENERAL PRACTICE

## 2023-11-28 PROCEDURE — 82607 VITAMIN B-12: CPT | Performed by: GENERAL PRACTICE

## 2023-11-28 PROCEDURE — 84443 ASSAY THYROID STIM HORMONE: CPT | Performed by: GENERAL PRACTICE

## 2023-11-28 RX ORDER — LISINOPRIL 40 MG/1
40 TABLET ORAL NIGHTLY
Qty: 90 TABLET | Refills: 3 | Status: SHIPPED | OUTPATIENT
Start: 2023-11-28

## 2023-11-28 NOTE — PATIENT INSTRUCTIONS
Advance Directive    Advance directives are legal documents that allow you to make decisions about your health care and medical treatment in case you become unable to communicate for yourself. Advance directives let your wishes be known to family, friends, and health care providers.  Discussing and writing advance directives should happen over time rather than all at once. Advance directives can be changed and updated at any time. There are different types of advance directives, such as:  Medical power of .  Living will.  Do not resuscitate (DNR) order or do not attempt resuscitation (DNAR) order.  Health care proxy and medical power of   A health care proxy is also called a health care agent. This person is appointed to make medical decisions for you when you are unable to make decisions for yourself. Generally, people ask a trusted friend or family member to act as their proxy and represent their preferences. Make sure you have an agreement with your trusted person to act as your proxy. A proxy may have to make a medical decision on your behalf if your wishes are not known.  A medical power of , also called a durable power of  for health care, is a legal document that names your health care proxy. Depending on the laws in your state, the document may need to be:  Signed.  Notarized.  Dated.  Copied.  Witnessed.  Incorporated into your medical record.  You may also want to appoint a trusted person to manage your money in the event you are unable to do so. This is called a durable power of  for finances. It is a separate legal document from the durable power of  for health care. You may choose your health care proxy or someone different to act as your agent in money matters.  If you do not appoint a proxy, or there is a concern that the proxy is not acting in your best interest, a court may appoint a guardian to act on your behalf.  Living will  A living will is a set  of instructions that state your wishes about medical care when you cannot express them yourself. Health care providers should keep a copy of your living will in your medical record. You may want to give a copy to family members or friends. To alert caregivers in case of an emergency, you can place a card in your wallet to let them know that you have a living will and where they can find it. A living will is used if you become:  Terminally ill.  Disabled.  Unable to communicate or make decisions.  The following decisions should be included in your living will:  To use or not to use life support equipment, such as dialysis machines and breathing machines (ventilators).  Whether you want a DNR or DNAR order. This tells health care providers not to use cardiopulmonary resuscitation (CPR) if breathing or heartbeat stops.  To use or not to use tube feeding.  To be given or not to be given food and fluids.  Whether you want comfort (palliative) care when the goal becomes comfort rather than a cure.  Whether you want to donate your organs and tissues.  A living will does not give instructions for distributing your money and property if you should pass away.  DNR or DNAR  A DNR or DNAR order is a request not to have CPR in the event that your heart stops beating or you stop breathing. If a DNR or DNAR order has not been made and shared, a health care provider will try to help any patient whose heart has stopped or who has stopped breathing. If you plan to have surgery, talk with your health care provider about how your DNR or DNAR order will be followed if problems occur.  What if I do not have an advance directive?  Some states assign family decision makers to act on your behalf if you do not have an advance directive. Each state has its own laws about advance directives. You may want to check with your health care provider, , or state representative about the laws in your state.  Summary  Advance directives are  legal documents that allow you to make decisions about your health care and medical treatment in case you become unable to communicate for yourself.  The process of discussing and writing advance directives should happen over time. You can change and update advance directives at any time.  Advance directives may include a medical power of , a living will, and a DNR or DNAR order.  This information is not intended to replace advice given to you by your health care provider. Make sure you discuss any questions you have with your health care provider.  Document Revised: 09/21/2021 Document Reviewed: 09/21/2021  [employer] Patient Education © 2023 [employer]  Heart Disease Prevention  Heart disease is the leading cause of death in the world. Coronary artery disease is the most common cause of heart disease. This condition results when cholesterol and other substances (plaque) build up inside the walls of the blood vessels that supply your heart muscle (arteries). This buildup in arteries is called atherosclerosis. You can take actions to lower your risk of heart disease.  How can heart disease affect me?  Heart disease can cause many unpleasant symptoms and complications, such as:  Chest pain (angina).  Reduced or blocked blood flow to your heart. This can cause:  Irregular heartbeats (arrhythmias).  Heart attack.  Heart failure.  What can increase my risk?  The following factors may make you more likely to develop this condition:  High blood pressure (hypertension).  High cholesterol.  A diet high in saturated fats or trans fats.  Obesity.  Diabetes.  Having a family history of heart disease.  Certain lifestyle factors, including:  Smoking.  Lack of physical activity.  Drinking too much alcohol.  What actions can I take to prevent heart disease?  Nutrition    Follow a heart-healthy eating plan as told by your health care provider. Examples include the DASH eating plan. DASH stands for Dietary Approaches to  Stop Hypertension.  Generally, it is recommended that you:  Eat less salt (sodium). Ask your health care provider how much sodium is safe for you. Most people should have less than 2,300 mg each day.  Limit unhealthy fats, such as saturated and trans fats, in your diet. You can do this by eating low-fat dairy products, eating less red meat, and avoiding processed foods.  Eat healthy fats (omega-3 fatty acids). These are found in fish, such as mackerel or salmon.  Eat more fruits and vegetables. You should try to fill one-half of your plate with fruits and vegetables at each meal.  Eat more whole grains.  Avoid foods and drinks that have added sugars. Try to limit how much added sugar you have to:  Less than 25 grams a day for women.  Less than 36 grams a day for men.  Lifestyle    Get regular exercise. This is one of the most important things you can do for your health. Generally, it is recommended that you:  Exercise for at least 30 minutes on most days of the week (150 minutes each week). This should be exercise that causes your heart to beat faster (aerobic exercise).  Add strength exercises on at least 2 days each week.  Do not use any products that contain nicotine or tobacco. These products include cigarettes, chewing tobacco, and vaping devices, such as e-cigarettes. These can damage your heart and blood vessels. If you need help quitting, ask your health care provider.  Alcohol use  Do not drink alcohol if:  Your health care provider tells you not to drink.  You are pregnant, may be pregnant, or are planning to become pregnant.  If you drink alcohol:  Limit how much you have to:  0-1 drink a day for women.  0-2 drinks a day for men.  Know how much alcohol is in your drink. In the [place] one drink equals one 12 oz bottle of beer (355 mL), one 5 oz glass of wine (148 mL), or one 1½ oz glass of hard liquor (44 mL).  Medicines  Take over-the-counter and prescription medicines only as told by your health care  provider.  Work with your health care provider to find out whether it is safe and beneficial for you to take aspirin daily. Make sure that you understand how much to take and what form to take.  Depending on your risk factors, your health care provider may prescribe medicines to lower your risk of heart disease or to control related conditions. You may take medicine to:  Lower cholesterol.  Control blood pressure.  Control diabetes.  General information  Keep your blood pressure under control, as recommended by your health care provider. For most healthy people, the upper number of their blood pressure (systolic) should be no higher than 120, and the lower number (diastolic) no higher than 80. Treatment may be needed if your blood pressure is higher than 130/80.  Have your blood pressure checked at least every 2 years. Your health care provider may check your blood pressure more often if you have high blood pressure.  After age 20, have your cholesterol checked every 4-6 years. If you have risk factors for heart disease, you may need to have it checked more often. Treatment may be needed if your cholesterol is high.  Have your body mass index (BMI) checked every year. Your health care provider can calculate your BMI from your height and weight.  Check your waist circumference. It should be:  No more than 35 inches (89 cm) for women who are not pregnant.  No more than 40 inches (102 cm) for men.  Work with your health care provider to lose weight, if needed, or to maintain a healthy weight.  Where to find more information:  Centers for Disease Control and Prevention: www.cdc.gov/heartdisease  American Heart Association: www.heart.org  Summary  Heart disease is the leading cause of death in the world.  Heart disease can cause chest pain, abnormal heart rhythms, heart attack, and heart failure.  Some of the risk factors for heart disease include high blood pressure, high cholesterol, and smoking.  You can take actions  to lower your chances of developing heart disease. Work with your health care provider to reduce your risk by following a heart-healthy diet, being physically active, and controlling your weight, blood pressure, and cholesterol level.  This information is not intended to replace advice given to you by your health care provider. Make sure you discuss any questions you have with your health care provider.  Document Revised: 08/17/2022 Document Reviewed: 08/17/2022  [employer] Patient Education © 2023 [employer]  Colorectal Cancer Screening    Colorectal cancer screening is a group of tests that are used to check for colorectal cancer before symptoms develop. Colorectal refers to the colon and rectum. The colon and rectum are located at the end of the digestive tract and carry stool (feces) out of the body.  Who should have screening?  All adults who are 45-75 years old should have screening. Your health care provider may recommend screening before age 45. You will have tests every 1-10 years, depending on your results and the type of screening test. Screening recommendations for adults who are 76-85 years old vary depending on a person's health. People older than age 85 should no longer get colorectal cancer screening.  You may have screening tests starting before age 45, or more often than other people, if you have any of these risk factors:  A personal or family history of colorectal cancer or abnormal growths known as polyps in your colon.  Inflammatory bowel disease, such as ulcerative colitis or Crohn's disease.  A history of having radiation treatment to the abdomen or the area between the hip bones (pelvic area) for cancer.  A type of genetic syndrome that is passed from parent to child (hereditary), such as:  Fajardo syndrome.  Familial adenomatous polyposis.  Turcot syndrome.  Peutz-Jeghers syndrome.  MUTYH-associated polyposis (MAP).  A personal history of diabetes.  Types of tests  There are several types of  colorectal screening tests. You may have one or more of the following:  Guaiac-based fecal occult blood testing. For this test, a stool sample is checked for hidden (occult) blood, which could be a sign of colorectal cancer.  Fecal immunochemical test (FIT). For this test, a stool sample is checked for blood, which could be a sign of colorectal cancer.  Stool DNA test. For this test, a stool sample is checked for blood and changes in DNA that could lead to colorectal cancer.  Sigmoidoscopy. During this test, a thin, flexible tube with a camera on the end, called a sigmoidoscope, is used to examine the rectum and the lower colon.  Colonoscopy. During this test, a long, flexible tube with a camera on the end, called a colonoscope, is used to examine the entire colon and rectum. Also, sometimes a tissue sample is taken to be looked at under a microscope (biopsy) or small polyps are removed during this test.  Virtual colonoscopy. Instead of a colonoscope, this type of colonoscopy uses a CT scan to take pictures of the colon and rectum. A CT scan is a type of X-ray that is made using computers.  What are the benefits of screening?  Screening reduces your risk for colorectal cancer and can help identify cancer at an early stage, when the cancer can be removed or treated more easily. It is common for polyps to form in the lining of the colon, especially as you age. These polyps may be cancerous or become cancerous over time. Screening can identify these polyps.  What are the risks of screening?  Generally, these are safe tests. However, problems may occur, including:  The need for more tests to confirm results from a stool sample test. Stool sample tests have fewer risks than other types of screening tests.  Being exposed to low levels of radiation, if you had a test involving X-rays. This may slightly increase your cancer risk. The benefit of detecting cancer outweighs the slight increase in risk.  Bleeding, damage to the  intestine, or infection caused by a sigmoidoscopy or colonoscopy.  A reaction to medicines given during a sigmoidoscopy or colonoscopy.  Talk with your health care provider to understand your risk for colorectal cancer and to make a screening plan that is right for you.  Questions to ask your health care provider  When should I start colorectal cancer screening?  What is my risk for colorectal cancer?  How often do I need screening?  Which screening tests do I need?  How do I get my test results?  What do my results mean?  Where to find more information  Learn more about colorectal cancer screening from:  The American Cancer Society: cancer.org  National Cancer [place]: cancer.gov  Summary  Colorectal cancer screening is a group of tests used to check for colorectal cancer before symptoms develop.  All adults who are 45-75 years old should have screening. Your health care provider may recommend screening before age 45.  You may have screening tests starting before age 45, or more often than other people, if you have certain risk factors.  Screening reduces your risk for colorectal cancer and can help identify cancer at an early stage, when the cancer can be removed or treated more easily.  Talk with your health care provider to understand your risk for colorectal cancer and to make a screening plan that is right for you.  This information is not intended to replace advice given to you by your health care provider. Make sure you discuss any questions you have with your health care provider.  Document Revised: 04/07/2021 Document Reviewed: 04/07/2021  [employer] Patient Education © 2023 [employer]

## 2023-11-28 NOTE — PROGRESS NOTES
The ABCs of the Annual Wellness Visit  Subsequent Medicare Wellness Visit    Chief Complaint  Subsequent Medicare Wellness Visit    Subjective    History of Present Illness:  Ed Villalba is a 68 y.o. male who presents for a Subsequent Medicare Wellness Visit.    Type 2 Diabetes Mellitus  He admits to intermittent polyuria.  He continues to deny any polydipsia, paresthesias of the feet, visual disturbances, hypoglycemia or foot ulcerations. He is taking metformin and sitagliptin with no apparent side effects    Hyperlipidemia  His compliance with treatment has been fairly good.  He is not following any particular diet but remains quite active. He is taking atorvastatin with no apparent side effects.     Essential Hypertension  He continues to deny any chest pain, palpitations, dyspnea, orthopnea, paroxysmal nocturnal dyspnea or peripheral edema.  He is taking lisinopril with no apparent side effects    GERD  He remains heartburn free on pantoprazole.  There is no history of any difficulty swallowing, nausea, vomiting, change in his bowel habits, hematochezia, or melena    Urinary Frequency  He has noted a continued improvement in his urinary frequency and nocturia.  He is only have a 1-2 episodes of the latter nightly at present.  He admits to occasional hesitancy but there is no history of any other difficulty voiding and he continues to deny any urgency, dysuria, or hematuria.  He admits to a several year history of increasing difficulty obtaining and maintaining erection.  He denies any problems with his bowel or bladder control and there is no history of any changes in his voice or beard growth.  There is no history of any lower extremity weakness, numbness, or tingling.  He was followed by urology in the past but remains uninterested in returning    The following portions of the patient's history were reviewed and   updated as appropriate: allergies, current medications, past family history, past medical  history, past social history, past surgical history, and problem list.    Compared to one year ago, the patient feels his physical   health is the same.    Compared to one year ago, the patient feels his mental   health is the same.    Recent Hospitalizations:  He was not admitted to the hospital during the last year.     Current Medical Providers:  Patient Care Team:  Dwight Borges MD as PCP - General (Family Medicine)    Outpatient Medications Prior to Visit   Medication Sig Dispense Refill    acetaminophen (TYLENOL) 500 MG tablet TAKE TWO TABLETS BY MOUTH THREE TIMES A DAY AS NEEDED FOR MILD PAIN 180 tablet 5    aspirin 81 MG EC tablet Take 1 tablet by mouth Daily. 90 tablet 3    atorvastatin (LIPITOR) 20 MG tablet Take 1 tablet by mouth Every Night. 90 tablet 3    cholecalciferol (VITAMIN D3) 25 MCG (1000 UT) tablet Take 1 tablet by mouth Daily. 90 tablet 3    Diclofenac Sodium (VOLTAREN) 1 % gel gel Apply 4 g to affected joints 4 times daily as needed 1500 g 3    fluticasone (Flonase) 50 MCG/ACT nasal spray 2 sprays into the nostril(s) as directed by provider Daily. Administer 2 sprays both nostrils once daily 54 g 3    glucose blood (OneTouch Ultra) test strip 1 each by Other route Daily. Use as instructed 100 each 3    Lancets (OneTouch Delica Plus Olgxni34H) misc USE TO TEST ONCE DAILY 100 each 3    loratadine (CLARITIN) 10 MG tablet Take 1 tablet by mouth Daily. 90 tablet 3    meloxicam (MOBIC) 15 MG tablet Take 1 tablet by mouth Daily As Needed for Moderate Pain. 90 tablet 3    pantoprazole (PROTONIX) 40 MG EC tablet Take 1 tablet by mouth Daily. 90 tablet 3    sildenafil (VIAGRA) 100 MG tablet TAKE 1/2 TO 1 TABLET BY MOUTH DAILY AS NEEDED 10 tablet 5    SITagliptin (Januvia) 100 MG tablet Take 1 tablet by mouth Daily. 90 tablet 3    lisinopril (PRINIVIL,ZESTRIL) 30 MG tablet Take 1 tablet by mouth Every Night. 90 tablet 3    metFORMIN ER (GLUCOPHAGE-XR) 500 MG 24 hr tablet Take 1 tablet by  mouth Daily. 90 tablet 5     No facility-administered medications prior to visit.     No opioid medication identified on active medication list. I have reviewed chart for other potential  high risk medication/s and harmful drug interactions in the elderly.        Aspirin is on active medication list. Aspirin use is indicated based on review of current medical condition/s. Pros and cons of this therapy have been discussed today. Benefits of this medication outweigh potential harm.  Patient has been encouraged to continue taking this medication.  .    Patient Active Problem List   Diagnosis    Dips tobacco    Essential hypertension    Mixed hyperlipidemia    Type 2 diabetes mellitus with diabetic neuropathy, without long-term current use of insulin    Urinary frequency    Chronic seasonal allergic rhinitis    Gastro-esophageal reflux disease without esophagitis    Seborrheic dermatitis    Healthcare maintenance    Encounter for immunization    Arthralgia of both hands    Dental caries    Other male erectile dysfunction     Advance Care Planning  Advance Directive is not on file.  ACP discussion was held with the patient during this visit. Patient does not have an advance directive, information provided.    Review of Systems   Constitutional:  Positive for fatigue. Negative for appetite change, chills and fever.   HENT:  Positive for rhinorrhea and sneezing. Negative for congestion, dental problem, ear pain, postnasal drip, sore throat and voice change.    Eyes:  Negative for visual disturbance.   Respiratory:  Negative for cough, shortness of breath and wheezing.    Cardiovascular:  Negative for chest pain, palpitations and leg swelling.   Gastrointestinal:  Negative for abdominal pain, blood in stool, constipation, diarrhea, nausea and vomiting.   Endocrine: Positive for polyuria. Negative for polydipsia.   Genitourinary:  Positive for difficulty urinating (intermittent hesitancy) and frequency (with nocturia x 1-2).  "Negative for dysuria, hematuria and urgency.        Erectile dysfunction   Musculoskeletal:  Positive for arthralgias. Negative for back pain, joint swelling, myalgias and neck pain.   Skin:  Negative for rash.   Neurological:  Negative for weakness and numbness.   Psychiatric/Behavioral:  Positive for sleep disturbance. Negative for dysphoric mood. The patient is not nervous/anxious.         Objective    Vitals:    11/28/23 1351   BP: 148/80   Pulse: 80   Resp: 14   Temp: 97 °F (36.1 °C)   TempSrc: Temporal   SpO2: 96%   Weight: 86.6 kg (191 lb)   Height: 177.8 cm (70\")     Estimated body mass index is 27.41 kg/m² as calculated from the following:    Height as of this encounter: 177.8 cm (70\").    Weight as of this encounter: 86.6 kg (191 lb).    Does the patient have evidence of cognitive impairment? No    Physical Exam  Constitutional:       General: He is not in acute distress.     Appearance: Normal appearance. He is well-developed. He is not ill-appearing or diaphoretic.      Comments: Accompanied by his wife. Bright and in good spirits. No apparent distress. No pallor, jaundice, diaphoresis, or cyanosis.   HENT:      Head: Atraumatic.      Right Ear: Tympanic membrane, ear canal and external ear normal.      Left Ear: Tympanic membrane, ear canal and external ear normal.      Mouth/Throat:      Lips: No lesions.      Mouth: Mucous membranes are moist. No oral lesions.      Dentition: Dental caries present.      Pharynx: No oropharyngeal exudate or posterior oropharyngeal erythema.   Eyes:      General: Lids are normal. Gaze aligned appropriately.      Extraocular Movements: Extraocular movements intact.      Conjunctiva/sclera: Conjunctivae normal.      Pupils: Pupils are equal.   Neck:      Thyroid: No thyroid mass or thyromegaly.      Vascular: No carotid bruit or JVD.      Trachea: Trachea normal. No tracheal deviation.   Cardiovascular:      Rate and Rhythm: Normal rate and regular rhythm.      Heart " sounds: Normal heart sounds, S1 normal and S2 normal. No murmur heard.     No gallop. No S3 or S4 sounds.   Pulmonary:      Effort: Pulmonary effort is normal.      Breath sounds: Normal breath sounds.   Abdominal:      General: Bowel sounds are normal. There is no distension or abdominal bruit.      Palpations: Abdomen is soft. There is no hepatomegaly, splenomegaly or mass.      Tenderness: There is no abdominal tenderness.      Hernia: No hernia is present.   Musculoskeletal:      Right lower leg: No edema.      Left lower leg: No edema.   Lymphadenopathy:      Head:      Right side of head: No submental, submandibular, tonsillar, preauricular, posterior auricular or occipital adenopathy.      Left side of head: No submental, submandibular, tonsillar, preauricular, posterior auricular or occipital adenopathy.      Cervical: No cervical adenopathy.      Upper Body:      Right upper body: No supraclavicular adenopathy.      Left upper body: No supraclavicular adenopathy.   Skin:     General: Skin is warm and dry.      Coloration: Skin is not cyanotic, jaundiced or pale.      Findings: No rash.      Nails: There is no clubbing.   Neurological:      Mental Status: He is alert and oriented to person, place, and time.      Cranial Nerves: No cranial nerve deficit, dysarthria or facial asymmetry.      Sensory: Sensory deficit (decreased vibration sense toes of both feet) present.      Motor: No tremor.      Coordination: Coordination normal.      Gait: Gait normal.   Psychiatric:         Attention and Perception: Attention normal.         Mood and Affect: Mood normal.         Speech: Speech normal.         Behavior: Behavior normal.         Thought Content: Thought content normal.       HEALTH RISK ASSESSMENT    Smoking Status:  Social History     Tobacco Use   Smoking Status Never    Passive exposure: Never   Smokeless Tobacco Current    Types: Snuff     Alcohol Consumption:  Social History     Substance and Sexual  Activity   Alcohol Use No     Fall Risk Screen:  ELAINEADI Fall Risk Assessment was completed, and patient is at LOW risk for falls.Assessment completed on:2023    Depression Screenin/28/2023     1:45 PM   PHQ-2/PHQ-9 Depression Screening   Little Interest or Pleasure in Doing Things 0-->not at all   Feeling Down, Depressed or Hopeless 0-->not at all   PHQ-9: Brief Depression Severity Measure Score 0     Health Habits and Functional and Cognitive Screenin/28/2023     1:51 PM   Functional & Cognitive Status   Do you have difficulty preparing food and eating? No   Do you have difficulty bathing yourself, getting dressed or grooming yourself? No   Do you have difficulty using the toilet? No   Do you have difficulty moving around from place to place? No   Do you have trouble with steps or getting out of a bed or a chair? No   Current Diet Unhealthy Diet   Dental Exam Not up to date   Eye Exam Not up to date   Exercise (times per week) 7 times per week   Current Exercises Include Walking   Do you need help using the phone?  No   Are you deaf or do you have serious difficulty hearing?  No   Do you need help to go to places out of walking distance? No   Do you need help shopping? No   Do you need help preparing meals?  No   Do you need help with housework?  No   Do you need help with laundry? No   Do you need help taking your medications? No   Do you need help managing money? No   Do you ever drive or ride in a car without wearing a seat belt? No   Have you felt unusual stress, anger or loneliness in the last month? No   Who do you live with? Spouse   If you need help, do you have trouble finding someone available to you? No   Have you been bothered in the last four weeks by sexual problems? No   Do you have difficulty concentrating, remembering or making decisions? Yes     Age-appropriate Screening Schedule:  Refer to the list below for future screening recommendations based on patient's age, sex  and/or medical conditions. Orders for these recommended tests are listed in the plan section. The patient has been provided with a written plan.    Health Maintenance   Topic Date Due    ZOSTER VACCINE (1 of 2) Never done    COLORECTAL CANCER SCREENING  03/02/2019    DIABETIC FOOT EXAM  01/07/2020    DIABETIC EYE EXAM  08/30/2023    COVID-19 Vaccine (3 - 2023-24 season) 09/01/2023    HEMOGLOBIN A1C  05/28/2024    URINE MICROALBUMIN  06/01/2024    ANNUAL WELLNESS VISIT  11/28/2024    LIPID PANEL  11/28/2024    BMI FOLLOWUP  11/28/2024    TDAP/TD VACCINES (3 - Td or Tdap) 07/23/2030    HEPATITIS C SCREENING  Completed    INFLUENZA VACCINE  Completed    Pneumococcal Vaccine 65+  Completed        Assessment & Plan   CMS Preventative Services Quick Reference  Risk Factors Identified During Encounter  Chronic Pain: Natural history and expected course discussed. Questions answered.  Immunizations Discussed/Encouraged: Shingrix, COVID19, and RSV (Respiratory Syncytial Virus)  Tobacco Use/Dependance Risk (use dotphrase .tobaccocessation for documentation)  The above risks/problems have been discussed with the patient.  Follow up actions/plans if indicated are seen below in the Assessment/Plan Section.  Pertinent information has been shared with the patient in the After Visit Summary.    Diagnoses and all orders for this visit:    1. Chronic seasonal allergic rhinitis     2. Mixed hyperlipidemia  Encouraged to continue to work on his diet and exercise plan.  Continue current medication  -     Comprehensive Metabolic Panel  -     Lipid Panel  -     TSH    3. Essential hypertension  Currently controlled-new problem  Reviewed options and agreed on an increased dose of lisinopril  -     lisinopril (PRINIVIL,ZESTRIL) 40 MG tablet; Take 1 tablet by mouth Every Night.  Dispense: 90 tablet; Refill: 3  -     CBC & Differential  -     Comprehensive Metabolic Panel  -     TSH    4. Dental caries  Encouraged to follow-up with  dentist    5. Type 2 diabetes mellitus with diabetic neuropathy, without long-term current use of insulin  Diabetes mellitus Type II, under good control.   Encouraged to continue to pursue ADA diet  Encouraged aerobic exercise.  Continue current medication  Updated labs drawn  -     Comprehensive Metabolic Panel  -     TSH  -     Hemoglobin A1c  -     Vitamin B12  -     MicroAlbumin, Urine, Random - Urine, Clean Catch    6. Gastro-esophageal reflux disease without esophagitis   Symptoms are currently well controlled.  Encouraged to report if this should change.  Continue current medication  -     CBC & Differential    7. Urinary frequency  As above.    8. Other male erectile dysfunction  -     TSH    9. Healthcare maintenance  Patient has already received a flu shot fall.  Reminded of the potential benefits of an updated COVID-19 shot along with RSV and Shingrix  He remains uninterested in any cancer screening    10. Seborrheic dermatitis    11. Dips tobacco  Lengthy discussion regarding the potential sequela of continued tobacco use and the options with respect to cessation.  Patient uninterested in pursuing at present but will consider    Follow Up:   Return in about 13 weeks (around 2/27/2024).     An After Visit Summary and PPPS were made available to the patient.

## 2023-11-29 VITALS
DIASTOLIC BLOOD PRESSURE: 80 MMHG | TEMPERATURE: 97 F | WEIGHT: 191 LBS | OXYGEN SATURATION: 96 % | HEIGHT: 70 IN | BODY MASS INDEX: 27.35 KG/M2 | HEART RATE: 80 BPM | SYSTOLIC BLOOD PRESSURE: 148 MMHG | RESPIRATION RATE: 14 BRPM

## 2023-11-29 DIAGNOSIS — E11.40 TYPE 2 DIABETES MELLITUS WITH DIABETIC NEUROPATHY, WITHOUT LONG-TERM CURRENT USE OF INSULIN: ICD-10-CM

## 2023-11-29 LAB
ALBUMIN SERPL-MCNC: 4.1 G/DL (ref 3.5–5.2)
ALBUMIN/GLOB SERPL: 1.4 G/DL
ALP SERPL-CCNC: 59 U/L (ref 39–117)
ALT SERPL W P-5'-P-CCNC: 18 U/L (ref 1–41)
ANION GAP SERPL CALCULATED.3IONS-SCNC: 12.1 MMOL/L (ref 5–15)
AST SERPL-CCNC: 20 U/L (ref 1–40)
BASOPHILS # BLD AUTO: 0.04 10*3/MM3 (ref 0–0.2)
BASOPHILS NFR BLD AUTO: 0.4 % (ref 0–1.5)
BILIRUB SERPL-MCNC: 0.4 MG/DL (ref 0–1.2)
BUN SERPL-MCNC: 10 MG/DL (ref 8–23)
BUN/CREAT SERPL: 11.8 (ref 7–25)
CALCIUM SPEC-SCNC: 9.3 MG/DL (ref 8.6–10.5)
CHLORIDE SERPL-SCNC: 102 MMOL/L (ref 98–107)
CHOLEST SERPL-MCNC: 85 MG/DL (ref 0–200)
CO2 SERPL-SCNC: 23.9 MMOL/L (ref 22–29)
CREAT SERPL-MCNC: 0.85 MG/DL (ref 0.76–1.27)
DEPRECATED RDW RBC AUTO: 44.4 FL (ref 37–54)
EGFRCR SERPLBLD CKD-EPI 2021: 94.6 ML/MIN/1.73
EOSINOPHIL # BLD AUTO: 0.25 10*3/MM3 (ref 0–0.4)
EOSINOPHIL NFR BLD AUTO: 2.6 % (ref 0.3–6.2)
ERYTHROCYTE [DISTWIDTH] IN BLOOD BY AUTOMATED COUNT: 13.5 % (ref 12.3–15.4)
GLOBULIN UR ELPH-MCNC: 3 GM/DL
GLUCOSE SERPL-MCNC: 144 MG/DL (ref 65–99)
HBA1C MFR BLD: 7.6 % (ref 4.8–5.6)
HCT VFR BLD AUTO: 42.4 % (ref 37.5–51)
HDLC SERPL-MCNC: 35 MG/DL (ref 40–60)
HGB BLD-MCNC: 14.7 G/DL (ref 13–17.7)
IMM GRANULOCYTES # BLD AUTO: 0.04 10*3/MM3 (ref 0–0.05)
IMM GRANULOCYTES NFR BLD AUTO: 0.4 % (ref 0–0.5)
LDLC SERPL CALC-MCNC: 28 MG/DL (ref 0–100)
LDLC/HDLC SERPL: 0.72 {RATIO}
LYMPHOCYTES # BLD AUTO: 2.43 10*3/MM3 (ref 0.7–3.1)
LYMPHOCYTES NFR BLD AUTO: 25.6 % (ref 19.6–45.3)
MCH RBC QN AUTO: 30.7 PG (ref 26.6–33)
MCHC RBC AUTO-ENTMCNC: 34.7 G/DL (ref 31.5–35.7)
MCV RBC AUTO: 88.5 FL (ref 79–97)
MONOCYTES # BLD AUTO: 0.66 10*3/MM3 (ref 0.1–0.9)
MONOCYTES NFR BLD AUTO: 7 % (ref 5–12)
NEUTROPHILS NFR BLD AUTO: 6.06 10*3/MM3 (ref 1.7–7)
NEUTROPHILS NFR BLD AUTO: 64 % (ref 42.7–76)
NRBC BLD AUTO-RTO: 0 /100 WBC (ref 0–0.2)
PLATELET # BLD AUTO: 215 10*3/MM3 (ref 140–450)
PMV BLD AUTO: 10.4 FL (ref 6–12)
POTASSIUM SERPL-SCNC: 4.3 MMOL/L (ref 3.5–5.2)
PROT SERPL-MCNC: 7.1 G/DL (ref 6–8.5)
RBC # BLD AUTO: 4.79 10*6/MM3 (ref 4.14–5.8)
SODIUM SERPL-SCNC: 138 MMOL/L (ref 136–145)
TRIGL SERPL-MCNC: 124 MG/DL (ref 0–150)
TSH SERPL DL<=0.05 MIU/L-ACNC: 1.08 UIU/ML (ref 0.27–4.2)
VIT B12 BLD-MCNC: 321 PG/ML (ref 211–946)
VLDLC SERPL-MCNC: 22 MG/DL (ref 5–40)
WBC NRBC COR # BLD AUTO: 9.48 10*3/MM3 (ref 3.4–10.8)

## 2023-11-29 RX ORDER — METFORMIN HYDROCHLORIDE 500 MG/1
1000 TABLET, EXTENDED RELEASE ORAL DAILY
Qty: 180 TABLET | Refills: 3 | Status: SHIPPED | OUTPATIENT
Start: 2023-11-29

## 2023-11-29 NOTE — PROGRESS NOTES
.LVM    -- He needs to increase metformin to 2 tablets daily-I have emailed a prescription to [facility] to reflect this

## 2023-12-24 DIAGNOSIS — E11.40 TYPE 2 DIABETES MELLITUS WITH DIABETIC NEUROPATHY, WITHOUT LONG-TERM CURRENT USE OF INSULIN: ICD-10-CM

## 2023-12-26 RX ORDER — LANCETS 33 GAUGE
EACH MISCELLANEOUS DAILY
Qty: 100 EACH | Refills: 3 | Status: SHIPPED | OUTPATIENT
Start: 2023-12-26

## 2024-02-29 ENCOUNTER — OFFICE VISIT (OUTPATIENT)
Dept: FAMILY MEDICINE CLINIC | Facility: CLINIC | Age: 69
End: 2024-02-29
Payer: MEDICARE

## 2024-02-29 VITALS
HEIGHT: 70 IN | DIASTOLIC BLOOD PRESSURE: 75 MMHG | SYSTOLIC BLOOD PRESSURE: 140 MMHG | HEART RATE: 88 BPM | RESPIRATION RATE: 14 BRPM | BODY MASS INDEX: 27.77 KG/M2 | OXYGEN SATURATION: 94 % | TEMPERATURE: 97.8 F | WEIGHT: 194 LBS

## 2024-02-29 DIAGNOSIS — M25.542 ARTHRALGIA OF BOTH HANDS: ICD-10-CM

## 2024-02-29 DIAGNOSIS — N52.8 OTHER MALE ERECTILE DYSFUNCTION: ICD-10-CM

## 2024-02-29 DIAGNOSIS — Z00.00 HEALTHCARE MAINTENANCE: ICD-10-CM

## 2024-02-29 DIAGNOSIS — I10 ESSENTIAL HYPERTENSION: ICD-10-CM

## 2024-02-29 DIAGNOSIS — E11.40 TYPE 2 DIABETES MELLITUS WITH DIABETIC NEUROPATHY, WITHOUT LONG-TERM CURRENT USE OF INSULIN: ICD-10-CM

## 2024-02-29 DIAGNOSIS — M25.541 ARTHRALGIA OF BOTH HANDS: ICD-10-CM

## 2024-02-29 DIAGNOSIS — L21.9 SEBORRHEIC DERMATITIS: ICD-10-CM

## 2024-02-29 DIAGNOSIS — J30.2 CHRONIC SEASONAL ALLERGIC RHINITIS: Primary | ICD-10-CM

## 2024-02-29 DIAGNOSIS — E78.2 MIXED HYPERLIPIDEMIA: ICD-10-CM

## 2024-02-29 DIAGNOSIS — Z72.0 DIPS TOBACCO: ICD-10-CM

## 2024-02-29 DIAGNOSIS — R35.0 URINARY FREQUENCY: ICD-10-CM

## 2024-02-29 DIAGNOSIS — K21.9 GASTRO-ESOPHAGEAL REFLUX DISEASE WITHOUT ESOPHAGITIS: ICD-10-CM

## 2024-02-29 RX ORDER — EMPAGLIFLOZIN, METFORMIN HYDROCHLORIDE 12.5; 1 MG/1; MG/1
1 TABLET, EXTENDED RELEASE ORAL EVERY MORNING
Qty: 90 TABLET | Refills: 3 | Status: SHIPPED | OUTPATIENT
Start: 2024-02-29

## 2024-03-04 DIAGNOSIS — N52.8 OTHER MALE ERECTILE DYSFUNCTION: ICD-10-CM

## 2024-03-04 RX ORDER — SILDENAFIL 100 MG/1
TABLET, FILM COATED ORAL
Qty: 10 TABLET | Refills: 5 | Status: SHIPPED | OUTPATIENT
Start: 2024-03-04

## 2024-05-01 NOTE — PROGRESS NOTES
Subjective   Ed Villalba is a 68 y.o. male.     Chief Complaint  He returns for a scheduled reassessment of multiple medical problems including type 2 diabetes mellitus, hyperlipidemia, essential hypertension, gastroesophageal reflux disease, and urinary frequency    History of Present Illness     Type 2 Diabetes Mellitus  He admits to intermittent polyuria.  He continues to deny any polydipsia, paresthesias of the feet, visual disturbances, hypoglycemia or foot ulcerations. He remains on metformin and sitagliptin with no apparent side effects.  He is scheduled to undergo a cataract extraction on the left in 1 week, and on the right in 1 month  Lab Results   Component Value Date    HGBA1C 7.60 (H) 11/28/2023     Lab Results   Component Value Date    VYHTBZRB62 321 11/28/2023     Hyperlipidemia  His compliance with treatment has been fairly good.  He is not following any particular diet but remains quite active. He is taking atorvastatin with no apparent side effects.   Lab Results   Component Value Date    CHOL 85 11/28/2023    CHLPL 88 05/02/2016    TRIG 124 11/28/2023    HDL 35 (L) 11/28/2023    LDL 28 11/28/2023     Essential Hypertension  He continues to deny any chest pain, palpitations, dyspnea, orthopnea, paroxysmal nocturnal dyspnea or peripheral edema.  He is taking lisinopril with no apparent side effects  Lab Results   Component Value Date    GLUCOSE 144 (H) 11/28/2023    BUN 10 11/28/2023    CREATININE 0.85 11/28/2023    EGFR 94.6 11/28/2023    BCR 11.8 11/28/2023    K 4.3 11/28/2023    CO2 23.9 11/28/2023    CALCIUM 9.3 11/28/2023    ALBUMIN 4.1 11/28/2023    BILITOT 0.4 11/28/2023    AST 20 11/28/2023    ALT 18 11/28/2023     Lab Results   Component Value Date    ALKPHOS 59 11/28/2023     GERD  He remains heartburn free on pantoprazole.  There is no history of any difficulty swallowing, nausea, vomiting, change in his bowel habits, hematochezia, or melena  Lab Results   Component Value Date    WBC  9.48 11/28/2023    HGB 14.7 11/28/2023    HCT 42.4 11/28/2023    MCV 88.5 11/28/2023     11/28/2023     Urinary Frequency  He has noted a continued improvement in his urinary frequency and nocturia.  He is only have a 1-2 episodes of the latter nightly at present.  He admits to occasional hesitancy but there is no history of any other difficulty voiding and he continues to deny any urgency, dysuria, or hematuria.  He admits to a several year history of increasing difficulty obtaining and maintaining erection.  He denies any problems with his bowel or bladder control and there is no history of any changes in his voice or beard growth.  There is no history of any lower extremity weakness, numbness, or tingling.  He was followed by urology in the past but remains uninterested in returning    Labs  Lab Results   Component Value Date    TSH 1.080 11/28/2023     The following portions of the patient's history were reviewed and updated as appropriate: allergies, current medications, past medical history, past social history, and problem list.    Review of Systems   Constitutional:  Positive for fatigue. Negative for chills and fever.   HENT:  Positive for rhinorrhea and sneezing. Negative for congestion, postnasal drip, sinus pressure, sore throat and voice change.    Eyes:  Positive for visual disturbance.   Respiratory:  Negative for cough, shortness of breath and wheezing.    Cardiovascular:  Negative for chest pain, palpitations and leg swelling.   Gastrointestinal:  Negative for abdominal pain, blood in stool, constipation, diarrhea, nausea, vomiting and GERD.   Genitourinary:  Positive for difficulty urinating (intermittent hesitancy), frequency, nocturia and erectile dysfunction. Negative for dysuria, hematuria, urgency and urinary incontinence.   Musculoskeletal:  Positive for arthralgias. Negative for back pain, joint swelling, myalgias and neck pain.   Skin:  Negative for color change and rash.    Neurological:  Negative for dizziness, weakness, numbness and confusion.   Hematological:  Does not bruise/bleed easily.   Psychiatric/Behavioral:  Positive for sleep disturbance. Negative for decreased concentration, hallucinations and depressed mood. The patient is not nervous/anxious.      Objective   Physical Exam  Constitutional:       General: He is not in acute distress.     Appearance: Normal appearance. He is well-developed. He is not ill-appearing or diaphoretic.      Comments: Accompanied by his wife. Bright and in good spirits. No apparent distress. No pallor, jaundice, diaphoresis, or cyanosis.   HENT:      Head: Atraumatic.      Right Ear: Tympanic membrane, ear canal and external ear normal.      Left Ear: Tympanic membrane, ear canal and external ear normal.      Mouth/Throat:      Lips: No lesions.      Mouth: Mucous membranes are moist. No oral lesions.      Dentition: Dental caries present.      Pharynx: No oropharyngeal exudate or posterior oropharyngeal erythema.   Eyes:      General: Lids are normal. Gaze aligned appropriately.      Extraocular Movements: Extraocular movements intact.      Conjunctiva/sclera: Conjunctivae normal.      Pupils: Pupils are equal.   Neck:      Thyroid: No thyroid mass or thyromegaly.      Vascular: No carotid bruit or JVD.      Trachea: Trachea normal. No tracheal deviation.   Cardiovascular:      Rate and Rhythm: Normal rate and regular rhythm.      Heart sounds: Normal heart sounds, S1 normal and S2 normal. No murmur heard.     No gallop. No S3 or S4 sounds.   Pulmonary:      Effort: Pulmonary effort is normal.      Breath sounds: Normal breath sounds.   Abdominal:      General: Bowel sounds are normal. There is no distension.   Musculoskeletal:      Right lower leg: No edema.      Left lower leg: No edema.   Lymphadenopathy:      Head:      Right side of head: No submental, submandibular, tonsillar, preauricular, posterior auricular or occipital adenopathy.       Left side of head: No submental, submandibular, tonsillar, preauricular, posterior auricular or occipital adenopathy.      Cervical: No cervical adenopathy.      Upper Body:      Right upper body: No supraclavicular adenopathy.      Left upper body: No supraclavicular adenopathy.   Skin:     General: Skin is warm and dry.      Coloration: Skin is not cyanotic, jaundiced or pale.      Findings: No rash.      Nails: There is no clubbing.   Neurological:      Mental Status: He is alert and oriented to person, place, and time.      Cranial Nerves: No cranial nerve deficit, dysarthria or facial asymmetry.      Sensory: Sensory deficit (decreased vibration sense toes of both feet) present.      Motor: No tremor.      Coordination: Coordination normal.      Gait: Gait normal.   Psychiatric:         Attention and Perception: Attention normal.         Mood and Affect: Mood normal.         Speech: Speech normal.         Behavior: Behavior normal.         Thought Content: Thought content normal.       Assessment & Plan   Problems Addressed this Visit          Allergies and Adverse Reactions    Chronic seasonal allergic rhinitis   Continue current medication  Encouraged to report if any worse or if any new symptoms or concerns.       Cardiac and Vasculature    Essential hypertension   Hypertension: marginal. Evidence of target organ damage: none.  Encouraged to continue to work on diet and exercise plan.   Continue current medication for now as the addition of an SGLT2 inhibitor might help    Mixed hyperlipidemia  As above.   Continue current medication.       Endocrine and Metabolic    Type 2 diabetes mellitus with diabetic neuropathy, without long-term current use of insulin  Diabetes mellitus Type II, under good control.   Encouraged to continue to pursue ADA diet  Encouraged aerobic exercise.  Reviewed options going forward and agreed on a trial of empagliflozin in place of sitagliptin  Updated labs will be arranged at  return.    Relevant Medications    Empagliflozin-metFORMIN HCl ER (Synjardy XR) 12.5-1000 MG tablet sustained-release 24 hour       Gastrointestinal Abdominal     Gastro-esophageal reflux disease without esophagitis       Genitourinary and Reproductive     Other male erectile dysfunction    Urinary frequency  Encouraged to report if any worse or if any new symptoms or concerns.       Health Encounters    Healthcare maintenance  Recommended RSV and an updated COVID-19 vaccination.  Reminded that he is due for Shingrix  He remains uninterested in any cancer screening       Musculoskeletal and Injuries    Arthralgia of both hands       Skin    Seborrheic dermatitis       Tobacco    Dips tobacco     Diagnoses         Codes Comments    Chronic seasonal allergic rhinitis    -  Primary ICD-10-CM: J30.2  ICD-9-CM: 477.8     Mixed hyperlipidemia     ICD-10-CM: E78.2  ICD-9-CM: 272.2     Essential hypertension     ICD-10-CM: I10  ICD-9-CM: 401.9     Type 2 diabetes mellitus with diabetic neuropathy, without long-term current use of insulin     ICD-10-CM: E11.40  ICD-9-CM: 250.60, 357.2     Gastro-esophageal reflux disease without esophagitis     ICD-10-CM: K21.9  ICD-9-CM: 530.81     Urinary frequency     ICD-10-CM: R35.0  ICD-9-CM: 788.41     Other male erectile dysfunction     ICD-10-CM: N52.8  ICD-9-CM: 607.84     Healthcare maintenance     ICD-10-CM: Z00.00  ICD-9-CM: V70.0     Arthralgia of both hands     ICD-10-CM: M25.541, M25.542  ICD-9-CM: 719.44     Seborrheic dermatitis     ICD-10-CM: L21.9  ICD-9-CM: 690.10     Dips tobacco     ICD-10-CM: Z72.0  ICD-9-CM: 305.1                       37.2

## 2024-06-21 DIAGNOSIS — K21.9 GASTRO-ESOPHAGEAL REFLUX DISEASE WITHOUT ESOPHAGITIS: ICD-10-CM

## 2024-06-21 RX ORDER — PANTOPRAZOLE SODIUM 40 MG/1
40 TABLET, DELAYED RELEASE ORAL DAILY
Qty: 90 TABLET | Refills: 3 | Status: SHIPPED | OUTPATIENT
Start: 2024-06-21

## 2024-07-09 DIAGNOSIS — M25.541 ARTHRALGIA OF BOTH HANDS: ICD-10-CM

## 2024-07-09 DIAGNOSIS — M25.542 ARTHRALGIA OF BOTH HANDS: ICD-10-CM

## 2024-08-28 ENCOUNTER — TELEPHONE (OUTPATIENT)
Dept: FAMILY MEDICINE CLINIC | Facility: CLINIC | Age: 69
End: 2024-08-28
Payer: MEDICARE

## 2024-08-28 NOTE — TELEPHONE ENCOUNTER
CARE GAP ENCOUNTER  Called to discuss colon screening with patient. Wife stated they were trying to get his insurance fixed due to Medicare advantage plan issues and he would be back in for regular care.

## 2024-09-03 DIAGNOSIS — J30.2 CHRONIC SEASONAL ALLERGIC RHINITIS: ICD-10-CM

## 2024-09-03 RX ORDER — FLUTICASONE PROPIONATE 50 MCG
SPRAY, SUSPENSION (ML) NASAL
Qty: 48 ML | OUTPATIENT
Start: 2024-09-03

## 2024-09-23 DIAGNOSIS — J30.2 CHRONIC SEASONAL ALLERGIC RHINITIS: ICD-10-CM

## 2024-09-23 RX ORDER — FLUTICASONE PROPIONATE 50 UG/1
SPRAY, METERED NASAL
Qty: 48 ML | OUTPATIENT
Start: 2024-09-23

## 2024-09-30 DIAGNOSIS — J30.2 CHRONIC SEASONAL ALLERGIC RHINITIS: ICD-10-CM

## 2024-09-30 RX ORDER — FLUTICASONE PROPIONATE 50 UG/1
SPRAY, METERED NASAL
Qty: 48 ML | OUTPATIENT
Start: 2024-09-30

## 2024-11-18 ENCOUNTER — OFFICE VISIT (OUTPATIENT)
Dept: FAMILY MEDICINE CLINIC | Facility: CLINIC | Age: 69
End: 2024-11-18
Payer: MEDICARE

## 2024-11-18 VITALS
HEART RATE: 66 BPM | SYSTOLIC BLOOD PRESSURE: 134 MMHG | HEIGHT: 70 IN | DIASTOLIC BLOOD PRESSURE: 70 MMHG | RESPIRATION RATE: 14 BRPM | BODY MASS INDEX: 25.77 KG/M2 | WEIGHT: 180 LBS | OXYGEN SATURATION: 96 % | TEMPERATURE: 97.8 F

## 2024-11-18 DIAGNOSIS — E11.40 TYPE 2 DIABETES MELLITUS WITH DIABETIC NEUROPATHY, WITHOUT LONG-TERM CURRENT USE OF INSULIN: ICD-10-CM

## 2024-11-18 DIAGNOSIS — Z72.0 DIPS TOBACCO: ICD-10-CM

## 2024-11-18 DIAGNOSIS — R35.0 URINARY FREQUENCY: ICD-10-CM

## 2024-11-18 DIAGNOSIS — N52.8 OTHER MALE ERECTILE DYSFUNCTION: ICD-10-CM

## 2024-11-18 DIAGNOSIS — Z00.00 HEALTHCARE MAINTENANCE: ICD-10-CM

## 2024-11-18 DIAGNOSIS — M25.541 ARTHRALGIA OF BOTH HANDS: ICD-10-CM

## 2024-11-18 DIAGNOSIS — M25.542 ARTHRALGIA OF BOTH HANDS: ICD-10-CM

## 2024-11-18 DIAGNOSIS — I10 ESSENTIAL HYPERTENSION: ICD-10-CM

## 2024-11-18 DIAGNOSIS — J30.2 CHRONIC SEASONAL ALLERGIC RHINITIS: Primary | ICD-10-CM

## 2024-11-18 DIAGNOSIS — Z23 ENCOUNTER FOR IMMUNIZATION: ICD-10-CM

## 2024-11-18 DIAGNOSIS — K21.9 GASTRO-ESOPHAGEAL REFLUX DISEASE WITHOUT ESOPHAGITIS: ICD-10-CM

## 2024-11-18 DIAGNOSIS — E78.2 MIXED HYPERLIPIDEMIA: ICD-10-CM

## 2024-11-18 PROCEDURE — 99214 OFFICE O/P EST MOD 30 MIN: CPT | Performed by: GENERAL PRACTICE

## 2024-11-18 PROCEDURE — G0008 ADMIN INFLUENZA VIRUS VAC: HCPCS | Performed by: GENERAL PRACTICE

## 2024-11-18 PROCEDURE — 90662 IIV NO PRSV INCREASED AG IM: CPT | Performed by: GENERAL PRACTICE

## 2024-11-18 PROCEDURE — 3078F DIAST BP <80 MM HG: CPT | Performed by: GENERAL PRACTICE

## 2024-11-18 PROCEDURE — 3075F SYST BP GE 130 - 139MM HG: CPT | Performed by: GENERAL PRACTICE

## 2024-11-18 RX ORDER — PANTOPRAZOLE SODIUM 40 MG/1
40 TABLET, DELAYED RELEASE ORAL DAILY
Qty: 90 TABLET | Refills: 3 | Status: SHIPPED | OUTPATIENT
Start: 2024-11-18

## 2024-11-18 RX ORDER — ACETAMINOPHEN 500 MG
1000 TABLET ORAL 3 TIMES DAILY PRN
Qty: 180 TABLET | Refills: 5 | Status: SHIPPED | OUTPATIENT
Start: 2024-11-18

## 2024-11-18 RX ORDER — EMPAGLIFLOZIN, METFORMIN HYDROCHLORIDE 12.5; 1 MG/1; MG/1
1 TABLET, EXTENDED RELEASE ORAL EVERY MORNING
Qty: 90 TABLET | Refills: 3 | Status: SHIPPED | OUTPATIENT
Start: 2024-11-18

## 2024-11-18 RX ORDER — ASPIRIN 81 MG/1
81 TABLET ORAL DAILY
Qty: 90 TABLET | Refills: 3 | Status: SHIPPED | OUTPATIENT
Start: 2024-11-18

## 2024-11-18 RX ORDER — LORATADINE 10 MG/1
10 TABLET ORAL DAILY
Qty: 90 TABLET | Refills: 3 | Status: SHIPPED | OUTPATIENT
Start: 2024-11-18

## 2024-11-18 RX ORDER — LISINOPRIL 40 MG/1
40 TABLET ORAL NIGHTLY
Qty: 90 TABLET | Refills: 3 | Status: SHIPPED | OUTPATIENT
Start: 2024-11-18

## 2024-11-18 RX ORDER — CHOLECALCIFEROL (VITAMIN D3) 25 MCG
1000 TABLET ORAL DAILY
Qty: 90 TABLET | Refills: 3 | Status: SHIPPED | OUTPATIENT
Start: 2024-11-18

## 2024-11-18 RX ORDER — LANCETS 33 GAUGE
1 EACH MISCELLANEOUS DAILY
Qty: 100 EACH | Refills: 3 | Status: SHIPPED | OUTPATIENT
Start: 2024-11-18

## 2024-11-18 RX ORDER — ATORVASTATIN CALCIUM 20 MG/1
20 TABLET, FILM COATED ORAL NIGHTLY
Qty: 90 TABLET | Refills: 3 | Status: SHIPPED | OUTPATIENT
Start: 2024-11-18

## 2024-11-18 RX ORDER — SILDENAFIL 100 MG/1
TABLET, FILM COATED ORAL
Qty: 10 TABLET | Refills: 5 | Status: SHIPPED | OUTPATIENT
Start: 2024-11-18

## 2024-11-18 RX ORDER — MICONAZOLE NITRATE 20 MG/G
1 CREAM TOPICAL 2 TIMES DAILY
Qty: 28 G | Refills: 2 | Status: SHIPPED | OUTPATIENT
Start: 2024-11-18

## 2024-11-18 RX ORDER — BLOOD SUGAR DIAGNOSTIC
1 STRIP MISCELLANEOUS DAILY
Qty: 100 EACH | Refills: 3 | Status: SHIPPED | OUTPATIENT
Start: 2024-11-18

## 2024-11-18 NOTE — PROGRESS NOTES
Subjective   Ed Villalba is a 69 y.o. male.     Chief Complaint  He returns 9-month absence for a scheduled reassessment of multiple medical problems including type 2 diabetes mellitus, hyperlipidemia, essential hypertension, gastroesophageal reflux disease, and urinary frequency    History of Present Illness     Type 2 Diabetes Mellitus  He admits to intermittent polyuria.  He continues to deny any polydipsia, paresthesias of the feet, visual disturbances, hypoglycemia or foot ulcerations. He is currently on metformin and empagliflozin.  He admits to intermittent redness and irritation of the foreskin that resolves promptly with topical miconazole    Hyperlipidemia  His compliance with treatment has been fairly good.  He is not following any particular diet but remains quite active. He is taking atorvastatin with no apparent side effects.     Essential Hypertension  He continues to deny any chest pain, palpitations, dyspnea, orthopnea, paroxysmal nocturnal dyspnea or peripheral edema.  He is taking lisinopril with no apparent side effects    GERD  He remains heartburn free on pantoprazole.  There is no history of any difficulty swallowing, nausea, vomiting, change in his bowel habits, hematochezia, or melena    Urinary Frequency  He has noted a continued improvement in his urinary frequency and nocturia.  He is only have a 1-2 episodes of the latter nightly at present.  He admits to occasional hesitancy but there is no history of any other difficulty voiding and he continues to deny any urgency, dysuria, or hematuria.  He admits to a several year history of increasing difficulty obtaining and maintaining erection.  He denies any problems with his bowel or bladder control and there is no history of any changes in his voice or beard growth.  There is no history of any lower extremity weakness, numbness, or tingling.  He was followed by urology in the past but remains uninterested in returning    The following  portions of the patient's history were reviewed and updated as appropriate: allergies, current medications, past medical history, past social history, and problem list.    Review of Systems   Constitutional:  Positive for fatigue. Negative for chills and fever.   HENT:  Positive for rhinorrhea and sneezing. Negative for congestion, postnasal drip, sinus pressure, sore throat and voice change.    Eyes:  Positive for visual disturbance.   Respiratory:  Negative for cough, shortness of breath and wheezing.    Cardiovascular:  Negative for chest pain, palpitations and leg swelling.   Gastrointestinal:  Negative for abdominal pain, blood in stool, constipation, diarrhea, nausea, vomiting and GERD.   Genitourinary:  Positive for difficulty urinating (intermittent hesitancy), frequency, nocturia and erectile dysfunction. Negative for dysuria, hematuria, urgency and urinary incontinence.   Musculoskeletal:  Positive for arthralgias. Negative for back pain, joint swelling, myalgias and neck pain.   Skin:  Negative for color change and rash.   Neurological:  Negative for dizziness, weakness, numbness and confusion.   Hematological:  Does not bruise/bleed easily.   Psychiatric/Behavioral:  Positive for sleep disturbance. Negative for decreased concentration, hallucinations and depressed mood. The patient is not nervous/anxious.      Objective   Physical Exam  Constitutional:       General: He is not in acute distress.     Appearance: Normal appearance. He is well-developed. He is not ill-appearing or diaphoretic.      Comments: Accompanied by his wife. Bright and in good spirits. No apparent distress. No pallor, jaundice, diaphoresis, or cyanosis.   HENT:      Head: Atraumatic.      Right Ear: Tympanic membrane, ear canal and external ear normal.      Left Ear: Tympanic membrane, ear canal and external ear normal.      Mouth/Throat:      Lips: No lesions.      Mouth: Mucous membranes are moist. No oral lesions.      Dentition:  Dental caries present.      Pharynx: No oropharyngeal exudate or posterior oropharyngeal erythema.   Eyes:      General: Lids are normal. Gaze aligned appropriately.      Extraocular Movements: Extraocular movements intact.      Conjunctiva/sclera: Conjunctivae normal.      Pupils: Pupils are equal.   Neck:      Thyroid: No thyroid mass or thyromegaly.      Vascular: No carotid bruit or JVD.      Trachea: Trachea normal. No tracheal deviation.   Cardiovascular:      Rate and Rhythm: Normal rate and regular rhythm.      Heart sounds: Normal heart sounds, S1 normal and S2 normal. No murmur heard.     No gallop. No S3 or S4 sounds.   Pulmonary:      Effort: Pulmonary effort is normal.      Breath sounds: Normal breath sounds.   Abdominal:      General: Bowel sounds are normal. There is no distension.   Musculoskeletal:      Right lower leg: No edema.      Left lower leg: No edema.   Lymphadenopathy:      Head:      Right side of head: No submental, submandibular, tonsillar, preauricular, posterior auricular or occipital adenopathy.      Left side of head: No submental, submandibular, tonsillar, preauricular, posterior auricular or occipital adenopathy.      Cervical: No cervical adenopathy.      Upper Body:      Right upper body: No supraclavicular adenopathy.      Left upper body: No supraclavicular adenopathy.   Skin:     General: Skin is warm and dry.      Coloration: Skin is not cyanotic, jaundiced or pale.      Findings: No rash.      Nails: There is no clubbing.   Neurological:      Mental Status: He is alert and oriented to person, place, and time.      Cranial Nerves: No cranial nerve deficit, dysarthria or facial asymmetry.      Sensory: Sensory deficit (decreased vibration sense toes of both feet) present.      Motor: No tremor.      Coordination: Coordination normal.      Gait: Gait normal.   Psychiatric:         Attention and Perception: Attention normal.         Mood and Affect: Mood normal.          Speech: Speech normal.         Behavior: Behavior normal.         Thought Content: Thought content normal.       Assessment & Plan   Problems Addressed this Visit          Allergies and Adverse Reactions    Chronic seasonal allergic rhinitis   Continue current medication  Encouraged to report if any worse or if any new symptoms or concerns.    Relevant Medications    loratadine (CLARITIN) 10 MG tablet       Cardiac and Vasculature    Essential hypertension   Hypertension: at goal. Evidence of target organ damage: none.  Encouraged to continue to work on diet and exercise plan.   Continue current medication    Relevant Medications    lisinopril (PRINIVIL,ZESTRIL) 40 MG tablet    aspirin 81 MG EC tablet    Other Relevant Orders    CBC & Differential    Comprehensive Metabolic Panel    TSH    Mixed hyperlipidemia  As above.   Continue current medication.    Relevant Medications    atorvastatin (LIPITOR) 20 MG tablet    Other Relevant Orders    Comprehensive Metabolic Panel    Lipid Panel    TSH       Endocrine and Metabolic    Type 2 diabetes mellitus with diabetic neuropathy, without long-term current use of insulin  Diabetes mellitus Type II, under unknown control.   Encouraged to continue to pursue ADA diet  Encouraged aerobic exercise.  Continue current medication  Scheduled for updated labs    Relevant Medications    Lancets (OneTouch Delica Plus [id number]) misc    glucose blood (OneTouch Ultra) test strip    Empagliflozin-metFORMIN HCl ER (Synjardy XR) 12.5-1000 MG tablet sustained-release 24 hour    Other Relevant Orders    Comprehensive Metabolic Panel    TSH    Hemoglobin A1c    Vitamin B12    MicroAlbumin, Urine, Random - Urine, Clean Catch       Gastrointestinal Abdominal     Gastro-esophageal reflux disease without esophagitis   Symptoms are currently well controlled.  Advised regarding lifestyle modification including: eating smaller meals, elevation of the head of bed at night, avoidance of caffeine,  chocolate, nicotine and peppermint, and avoiding tight fitting clothing.  Continue current medication.    Relevant Medications    pantoprazole (PROTONIX) 40 MG EC tablet    Other Relevant Orders    CBC & Differential       Genitourinary and Reproductive     Other male erectile dysfunction    Relevant Medications    sildenafil (VIAGRA) 100 MG tablet    Urinary frequency  Patient remains uninterested in a urology reassessment  Encouraged to report if any worse or if any new symptoms or concerns.       Health Encounters    Healthcare maintenance  Flu shot administered.  Recommended RSV and COVID-19 shots this fall  Reminded that he is due for Shingrix  He remains uninterested in undergoing colon cancer screening    Relevant Medications    cholecalciferol (VITAMIN D3) 25 MCG (1000 UT) tablet    Other Relevant Orders    Fluzone High-Dose 65+yrs (Completed)       Infectious Diseases    Encounter for immunization    Relevant Orders    Fluzone High-Dose 65+yrs (Completed)       Musculoskeletal and Injuries    Arthralgia of both hands  Reminded regarding symptomatic treatment.   Continue current medication   Encouraged to report if any worse or if any new symptoms or concerns    Relevant Medications    Diclofenac Sodium (VOLTAREN) 1 % gel gel    acetaminophen (TYLENOL) 500 MG tablet    Other Relevant Orders    CBC & Differential       Tobacco    Dips tobacco     Diagnoses         Codes Comments    Chronic seasonal allergic rhinitis    -  Primary ICD-10-CM: J30.2  ICD-9-CM: 477.8     Mixed hyperlipidemia     ICD-10-CM: E78.2  ICD-9-CM: 272.2     Essential hypertension     ICD-10-CM: I10  ICD-9-CM: 401.9     Type 2 diabetes mellitus with diabetic neuropathy, without long-term current use of insulin     ICD-10-CM: E11.40  ICD-9-CM: 250.60, 357.2     Gastro-esophageal reflux disease without esophagitis     ICD-10-CM: K21.9  ICD-9-CM: 530.81     Urinary frequency     ICD-10-CM: R35.0  ICD-9-CM: 788.41     Other male erectile  dysfunction     ICD-10-CM: N52.8  ICD-9-CM: 607.84     Healthcare maintenance     ICD-10-CM: Z00.00  ICD-9-CM: V70.0     Arthralgia of both hands     ICD-10-CM: M25.541, M25.542  ICD-9-CM: 719.44     Dips tobacco     ICD-10-CM: Z72.0  ICD-9-CM: 305.1     Encounter for immunization     ICD-10-CM: Z23  ICD-9-CM: V03.89

## 2024-11-22 ENCOUNTER — LAB (OUTPATIENT)
Dept: FAMILY MEDICINE CLINIC | Facility: CLINIC | Age: 69
End: 2024-11-22
Payer: MEDICARE

## 2024-11-22 DIAGNOSIS — I10 ESSENTIAL HYPERTENSION: ICD-10-CM

## 2024-11-22 DIAGNOSIS — K21.9 GASTRO-ESOPHAGEAL REFLUX DISEASE WITHOUT ESOPHAGITIS: ICD-10-CM

## 2024-11-22 DIAGNOSIS — M25.541 ARTHRALGIA OF BOTH HANDS: ICD-10-CM

## 2024-11-22 DIAGNOSIS — E11.40 TYPE 2 DIABETES MELLITUS WITH DIABETIC NEUROPATHY, WITHOUT LONG-TERM CURRENT USE OF INSULIN: ICD-10-CM

## 2024-11-22 DIAGNOSIS — E78.2 MIXED HYPERLIPIDEMIA: ICD-10-CM

## 2024-11-22 DIAGNOSIS — M25.542 ARTHRALGIA OF BOTH HANDS: ICD-10-CM

## 2024-11-22 DIAGNOSIS — R79.89 ABNORMAL CBC: Primary | ICD-10-CM

## 2024-11-22 LAB
ALBUMIN SERPL-MCNC: 4 G/DL (ref 3.5–5.2)
ALBUMIN UR-MCNC: 1.9 MG/DL
ALBUMIN/GLOB SERPL: 1.2 G/DL
ALP SERPL-CCNC: 76 U/L (ref 39–117)
ALT SERPL W P-5'-P-CCNC: 18 U/L (ref 1–41)
ANION GAP SERPL CALCULATED.3IONS-SCNC: 12.8 MMOL/L (ref 5–15)
AST SERPL-CCNC: 19 U/L (ref 1–40)
BASOPHILS # BLD MANUAL: 0.09 10*3/MM3 (ref 0–0.2)
BASOPHILS NFR BLD MANUAL: 1 % (ref 0–1.5)
BILIRUB SERPL-MCNC: 0.3 MG/DL (ref 0–1.2)
BUN SERPL-MCNC: 19 MG/DL (ref 8–23)
BUN/CREAT SERPL: 17.9 (ref 7–25)
CALCIUM SPEC-SCNC: 9 MG/DL (ref 8.6–10.5)
CHLORIDE SERPL-SCNC: 104 MMOL/L (ref 98–107)
CHOLEST SERPL-MCNC: 83 MG/DL (ref 0–200)
CO2 SERPL-SCNC: 20.2 MMOL/L (ref 22–29)
CREAT SERPL-MCNC: 1.06 MG/DL (ref 0.76–1.27)
DEPRECATED RDW RBC AUTO: 48.3 FL (ref 37–54)
EGFRCR SERPLBLD CKD-EPI 2021: 76 ML/MIN/1.73
EOSINOPHIL # BLD MANUAL: 2.22 10*3/MM3 (ref 0–0.4)
EOSINOPHIL NFR BLD MANUAL: 24 % (ref 0.3–6.2)
ERYTHROCYTE [DISTWIDTH] IN BLOOD BY AUTOMATED COUNT: 14.8 % (ref 12.3–15.4)
GLOBULIN UR ELPH-MCNC: 3.4 GM/DL
GLUCOSE SERPL-MCNC: 200 MG/DL (ref 65–99)
HBA1C MFR BLD: 7.5 % (ref 4.8–5.6)
HCT VFR BLD AUTO: 43.6 % (ref 37.5–51)
HDLC SERPL-MCNC: 32 MG/DL (ref 40–60)
HGB BLD-MCNC: 14.7 G/DL (ref 13–17.7)
LDLC SERPL CALC-MCNC: 23 MG/DL (ref 0–100)
LDLC/HDLC SERPL: 0.53 {RATIO}
LYMPHOCYTES # BLD MANUAL: 1.58 10*3/MM3 (ref 0.7–3.1)
LYMPHOCYTES NFR BLD MANUAL: 18 % (ref 5–12)
MCH RBC QN AUTO: 29.8 PG (ref 26.6–33)
MCHC RBC AUTO-ENTMCNC: 33.7 G/DL (ref 31.5–35.7)
MCV RBC AUTO: 88.4 FL (ref 79–97)
METAMYELOCYTES NFR BLD MANUAL: 1 % (ref 0–0)
MONOCYTES # BLD: 1.67 10*3/MM3 (ref 0.1–0.9)
NEUTROPHILS # BLD AUTO: 3.62 10*3/MM3 (ref 1.7–7)
NEUTROPHILS NFR BLD MANUAL: 38 % (ref 42.7–76)
NEUTS BAND NFR BLD MANUAL: 1 % (ref 0–5)
PLAT MORPH BLD: NORMAL
PLATELET # BLD AUTO: 215 10*3/MM3 (ref 140–450)
PMV BLD AUTO: 9.7 FL (ref 6–12)
POTASSIUM SERPL-SCNC: 4.4 MMOL/L (ref 3.5–5.2)
PROT SERPL-MCNC: 7.4 G/DL (ref 6–8.5)
RBC # BLD AUTO: 4.93 10*6/MM3 (ref 4.14–5.8)
RBC MORPH BLD: NORMAL
SCAN SLIDE: NORMAL
SODIUM SERPL-SCNC: 137 MMOL/L (ref 136–145)
TRIGL SERPL-MCNC: 171 MG/DL (ref 0–150)
TSH SERPL DL<=0.05 MIU/L-ACNC: 2.58 UIU/ML (ref 0.27–4.2)
VARIANT LYMPHS NFR BLD MANUAL: 17 % (ref 19.6–45.3)
VIT B12 BLD-MCNC: 408 PG/ML (ref 211–946)
VLDLC SERPL-MCNC: 28 MG/DL (ref 5–40)
WBC NRBC COR # BLD AUTO: 9.27 10*3/MM3 (ref 3.4–10.8)

## 2024-11-22 PROCEDURE — 84443 ASSAY THYROID STIM HORMONE: CPT | Performed by: GENERAL PRACTICE

## 2024-11-22 PROCEDURE — 80053 COMPREHEN METABOLIC PANEL: CPT | Performed by: GENERAL PRACTICE

## 2024-11-22 PROCEDURE — 83036 HEMOGLOBIN GLYCOSYLATED A1C: CPT | Performed by: GENERAL PRACTICE

## 2024-11-22 PROCEDURE — 85007 BL SMEAR W/DIFF WBC COUNT: CPT | Performed by: GENERAL PRACTICE

## 2024-11-22 PROCEDURE — 82607 VITAMIN B-12: CPT | Performed by: GENERAL PRACTICE

## 2024-11-22 PROCEDURE — 82043 UR ALBUMIN QUANTITATIVE: CPT | Performed by: GENERAL PRACTICE

## 2024-11-22 PROCEDURE — 85025 COMPLETE CBC W/AUTO DIFF WBC: CPT | Performed by: GENERAL PRACTICE

## 2024-11-22 PROCEDURE — 36415 COLL VENOUS BLD VENIPUNCTURE: CPT

## 2024-11-22 PROCEDURE — 80061 LIPID PANEL: CPT | Performed by: GENERAL PRACTICE

## 2024-11-26 ENCOUNTER — TELEPHONE (OUTPATIENT)
Dept: FAMILY MEDICINE CLINIC | Facility: CLINIC | Age: 69
End: 2024-11-26
Payer: MEDICARE

## 2024-11-26 DIAGNOSIS — R79.89 ABNORMAL CBC: Primary | ICD-10-CM

## 2024-11-26 NOTE — TELEPHONE ENCOUNTER
Spoke with patient wife, Oriana, and made her aware that patient will need to have another series of labs before we can scheduled hematology appointment. Let her know that order were in patient chart and that he did not need to be fasting. She stated she would let him know and that they would come by office Monday or Tuesday of next week, after Thanksgiving holiday.

## 2024-12-02 ENCOUNTER — LAB (OUTPATIENT)
Dept: FAMILY MEDICINE CLINIC | Facility: CLINIC | Age: 69
End: 2024-12-02
Payer: MEDICARE

## 2024-12-02 DIAGNOSIS — J30.2 CHRONIC SEASONAL ALLERGIC RHINITIS: Primary | ICD-10-CM

## 2024-12-02 DIAGNOSIS — R79.89 ABNORMAL CBC: ICD-10-CM

## 2024-12-02 LAB
BASOPHILS # BLD AUTO: 0.05 10*3/MM3 (ref 0–0.2)
BASOPHILS NFR BLD AUTO: 0.4 % (ref 0–1.5)
DEPRECATED RDW RBC AUTO: 46.9 FL (ref 37–54)
EOSINOPHIL # BLD AUTO: 1.62 10*3/MM3 (ref 0–0.4)
EOSINOPHIL NFR BLD AUTO: 12.9 % (ref 0.3–6.2)
ERYTHROCYTE [DISTWIDTH] IN BLOOD BY AUTOMATED COUNT: 14.4 % (ref 12.3–15.4)
HCT VFR BLD AUTO: 41.6 % (ref 37.5–51)
HGB BLD-MCNC: 13.9 G/DL (ref 13–17.7)
IMM GRANULOCYTES # BLD AUTO: 0.06 10*3/MM3 (ref 0–0.05)
IMM GRANULOCYTES NFR BLD AUTO: 0.5 % (ref 0–0.5)
LYMPHOCYTES # BLD AUTO: 1.97 10*3/MM3 (ref 0.7–3.1)
LYMPHOCYTES NFR BLD AUTO: 15.7 % (ref 19.6–45.3)
MCH RBC QN AUTO: 29.6 PG (ref 26.6–33)
MCHC RBC AUTO-ENTMCNC: 33.4 G/DL (ref 31.5–35.7)
MCV RBC AUTO: 88.5 FL (ref 79–97)
MONOCYTES # BLD AUTO: 0.66 10*3/MM3 (ref 0.1–0.9)
MONOCYTES NFR BLD AUTO: 5.3 % (ref 5–12)
NEUTROPHILS NFR BLD AUTO: 65.2 % (ref 42.7–76)
NEUTROPHILS NFR BLD AUTO: 8.15 10*3/MM3 (ref 1.7–7)
NRBC BLD AUTO-RTO: 0 /100 WBC (ref 0–0.2)
PATHOLOGY REVIEW: YES
PLATELET # BLD AUTO: 236 10*3/MM3 (ref 140–450)
PMV BLD AUTO: 10 FL (ref 6–12)
RBC # BLD AUTO: 4.7 10*6/MM3 (ref 4.14–5.8)
WBC NRBC COR # BLD AUTO: 12.51 10*3/MM3 (ref 3.4–10.8)

## 2024-12-02 PROCEDURE — 85025 COMPLETE CBC W/AUTO DIFF WBC: CPT | Performed by: GENERAL PRACTICE

## 2024-12-02 PROCEDURE — 36415 COLL VENOUS BLD VENIPUNCTURE: CPT

## 2024-12-02 RX ORDER — FLUTICASONE PROPIONATE 50 MCG
2 SPRAY, SUSPENSION (ML) NASAL DAILY
Qty: 48 G | Refills: 3 | Status: SHIPPED | OUTPATIENT
Start: 2024-12-02

## 2024-12-03 LAB
LAB AP CASE REPORT: NORMAL
PATH REPORT.FINAL DX SPEC: NORMAL

## 2024-12-05 DIAGNOSIS — R79.89 ABNORMAL CBC: Primary | ICD-10-CM

## 2025-01-16 ENCOUNTER — PATIENT OUTREACH (OUTPATIENT)
Dept: FAMILY MEDICINE CLINIC | Facility: CLINIC | Age: 70
End: 2025-01-16
Payer: MEDICARE

## 2025-01-16 NOTE — OUTREACH NOTE
Called and patient was not home.   Left message with wife that he could call office or I will reach back out.

## 2025-02-24 ENCOUNTER — OFFICE VISIT (OUTPATIENT)
Dept: FAMILY MEDICINE CLINIC | Facility: CLINIC | Age: 70
End: 2025-02-24
Payer: MEDICARE

## 2025-02-24 VITALS
HEIGHT: 70 IN | WEIGHT: 177 LBS | RESPIRATION RATE: 14 BRPM | HEART RATE: 84 BPM | OXYGEN SATURATION: 97 % | BODY MASS INDEX: 25.34 KG/M2 | SYSTOLIC BLOOD PRESSURE: 128 MMHG | DIASTOLIC BLOOD PRESSURE: 70 MMHG | TEMPERATURE: 97.9 F

## 2025-02-24 DIAGNOSIS — M25.541 ARTHRALGIA OF BOTH HANDS: ICD-10-CM

## 2025-02-24 DIAGNOSIS — R35.0 URINARY FREQUENCY: ICD-10-CM

## 2025-02-24 DIAGNOSIS — J30.2 CHRONIC SEASONAL ALLERGIC RHINITIS: Primary | ICD-10-CM

## 2025-02-24 DIAGNOSIS — Z00.00 HEALTHCARE MAINTENANCE: ICD-10-CM

## 2025-02-24 DIAGNOSIS — R63.4 WEIGHT LOSS: ICD-10-CM

## 2025-02-24 DIAGNOSIS — L21.9 SEBORRHEIC DERMATITIS: ICD-10-CM

## 2025-02-24 DIAGNOSIS — K02.9 DENTAL CARIES: ICD-10-CM

## 2025-02-24 DIAGNOSIS — K21.9 GASTRO-ESOPHAGEAL REFLUX DISEASE WITHOUT ESOPHAGITIS: ICD-10-CM

## 2025-02-24 DIAGNOSIS — E78.2 MIXED HYPERLIPIDEMIA: ICD-10-CM

## 2025-02-24 DIAGNOSIS — M25.542 ARTHRALGIA OF BOTH HANDS: ICD-10-CM

## 2025-02-24 DIAGNOSIS — Z72.0 DIPS TOBACCO: ICD-10-CM

## 2025-02-24 DIAGNOSIS — I10 ESSENTIAL HYPERTENSION: ICD-10-CM

## 2025-02-24 DIAGNOSIS — E11.40 TYPE 2 DIABETES MELLITUS WITH DIABETIC NEUROPATHY, WITHOUT LONG-TERM CURRENT USE OF INSULIN: ICD-10-CM

## 2025-02-24 DIAGNOSIS — N52.8 OTHER MALE ERECTILE DYSFUNCTION: ICD-10-CM

## 2025-02-24 PROCEDURE — 99214 OFFICE O/P EST MOD 30 MIN: CPT | Performed by: GENERAL PRACTICE

## 2025-02-24 PROCEDURE — 80053 COMPREHEN METABOLIC PANEL: CPT | Performed by: GENERAL PRACTICE

## 2025-02-24 PROCEDURE — 82570 ASSAY OF URINE CREATININE: CPT | Performed by: GENERAL PRACTICE

## 2025-02-24 PROCEDURE — 81001 URINALYSIS AUTO W/SCOPE: CPT | Performed by: GENERAL PRACTICE

## 2025-02-24 PROCEDURE — 3078F DIAST BP <80 MM HG: CPT | Performed by: GENERAL PRACTICE

## 2025-02-24 PROCEDURE — 82607 VITAMIN B-12: CPT | Performed by: GENERAL PRACTICE

## 2025-02-24 PROCEDURE — 80061 LIPID PANEL: CPT | Performed by: GENERAL PRACTICE

## 2025-02-24 PROCEDURE — 83036 HEMOGLOBIN GLYCOSYLATED A1C: CPT | Performed by: GENERAL PRACTICE

## 2025-02-24 PROCEDURE — 1170F FXNL STATUS ASSESSED: CPT | Performed by: GENERAL PRACTICE

## 2025-02-24 PROCEDURE — 84443 ASSAY THYROID STIM HORMONE: CPT | Performed by: GENERAL PRACTICE

## 2025-02-24 PROCEDURE — 3074F SYST BP LT 130 MM HG: CPT | Performed by: GENERAL PRACTICE

## 2025-02-24 PROCEDURE — G0103 PSA SCREENING: HCPCS | Performed by: GENERAL PRACTICE

## 2025-02-24 PROCEDURE — 85652 RBC SED RATE AUTOMATED: CPT | Performed by: GENERAL PRACTICE

## 2025-02-24 PROCEDURE — 85025 COMPLETE CBC W/AUTO DIFF WBC: CPT | Performed by: GENERAL PRACTICE

## 2025-02-24 PROCEDURE — 86140 C-REACTIVE PROTEIN: CPT | Performed by: GENERAL PRACTICE

## 2025-02-24 PROCEDURE — 82043 UR ALBUMIN QUANTITATIVE: CPT | Performed by: GENERAL PRACTICE

## 2025-02-24 PROCEDURE — 36415 COLL VENOUS BLD VENIPUNCTURE: CPT | Performed by: GENERAL PRACTICE

## 2025-02-24 PROCEDURE — G0439 PPPS, SUBSEQ VISIT: HCPCS | Performed by: GENERAL PRACTICE

## 2025-02-24 NOTE — ASSESSMENT & PLAN NOTE
Diabetes mellitus Type II, under good control.   Encouraged to continue to pursue ADA diet  Encouraged aerobic exercise.  Continue current medication  Orders:    Comprehensive Metabolic Panel    TSH    Hemoglobin A1c    Vitamin B12    Microalbumin / Creatinine Urine Ratio - Urine, Clean Catch    Urinalysis With Microscopic - Urine, Clean Catch

## 2025-02-24 NOTE — ASSESSMENT & PLAN NOTE
Symptoms are currently well controlled.  Continue current medication.  Orders:    CBC & Differential

## 2025-02-24 NOTE — ASSESSMENT & PLAN NOTE
As above.   Continue current medication  Orders:    CBC & Differential    Comprehensive Metabolic Panel    TSH

## 2025-02-24 NOTE — ASSESSMENT & PLAN NOTE
Continue current medication  Encouraged to report if any worse or if any new symptoms or concerns.

## 2025-02-24 NOTE — PROGRESS NOTES
Subjective   The ABCs of the Annual Wellness Visit  Medicare Wellness Visit    Ed Villalba is a 69 y.o. patient who presents for a Medicare Wellness Visit.    The following portions of the patient's history were reviewed and   updated as appropriate: allergies, current medications, past family history, past medical history, past social history, past surgical history, and problem list.    Compared to one year ago, the patient's physical   health is the same.  Compared to one year ago, the patient's mental   health is the same.    Recent Hospitalizations:  He was not admitted to the hospital during the last year.     Current Medical Providers:  Patient Care Team:  Diwght Borges MD as PCP - General (Family Medicine)    Outpatient Medications Prior to Visit   Medication Sig Dispense Refill    acetaminophen (TYLENOL) 500 MG tablet Take 2 tablets by mouth 3 (Three) Times a Day As Needed for Mild Pain. 180 tablet 5    aspirin 81 MG EC tablet Take 1 tablet by mouth Daily. 90 tablet 3    atorvastatin (LIPITOR) 20 MG tablet Take 1 tablet by mouth Every Night. 90 tablet 3    cholecalciferol (VITAMIN D3) 25 MCG (1000 UT) tablet Take 1 tablet by mouth Daily. 90 tablet 3    Diclofenac Sodium (VOLTAREN) 1 % gel gel APPLY FOUR GRAMS TOPICALLY TO THE AFFECTED JOINTS FOUR TIMES A DAY AS NEEDED 500 g 5    Empagliflozin-metFORMIN HCl ER (Synjardy XR) 12.5-1000 MG tablet sustained-release 24 hour Take 1 tablet by mouth Every Morning. 90 tablet 3    fluticasone (FLONASE) 50 MCG/ACT nasal spray Administer 2 sprays into the nostril(s) as directed by provider Daily. Administer 2 sprays both nostrils once daily 48 g 3    glucose blood (OneTouch Ultra) test strip 1 each by Other route Daily. Use as instructed 100 each 3    Lancets (OneTouch Delica Plus [id number]) misc 1 each by Other route Daily. use to test daily 100 each 3    lisinopril (PRINIVIL,ZESTRIL) 40 MG tablet Take 1 tablet by mouth Every Night. 90 tablet 3     "loratadine (CLARITIN) 10 MG tablet Take 1 tablet by mouth Daily. 90 tablet 3    meloxicam (MOBIC) 15 MG tablet Take 1 tablet by mouth Daily As Needed for Moderate Pain. 90 tablet 3    miconazole (MICOTIN) 2 % cream Apply 1 Application topically to the appropriate area as directed 2 (Two) Times a Day. 28 g 2    pantoprazole (PROTONIX) 40 MG EC tablet Take 1 tablet by mouth Daily. 90 tablet 3    sildenafil (VIAGRA) 100 MG tablet TAKE ONE-HALF TO ONE TABLET BY MOUTH DAILY AS NEEDED 10 tablet 5     No facility-administered medications prior to visit.     No opioid medication identified on active medication list. I have reviewed chart for other potential  high risk medication/s and harmful drug interactions in the elderly.      Aspirin is on active medication list. Aspirin use is not indicated based on review of current medical condition/s. Risk of harm outweighs potential benefits. Patient instructed to discontinue this medication.  .    Patient Active Problem List   Diagnosis    Dips tobacco    Essential hypertension    Mixed hyperlipidemia    Type 2 diabetes mellitus with diabetic neuropathy, without long-term current use of insulin    Urinary frequency    Chronic seasonal allergic rhinitis    Gastro-esophageal reflux disease without esophagitis    Seborrheic dermatitis    Healthcare maintenance    Encounter for immunization    Arthralgia of both hands    Dental caries    Other male erectile dysfunction    Weight loss     Advance Care Planning Advance Directive is not on file.  ACP discussion was held with the patient during this visit. Patient does not have an advance directive, information provided.      Objective   Vitals:    02/24/25 1337   BP: 128/70   Pulse: 84   Resp: 14   Temp: 97.9 °F (36.6 °C)   TempSrc: Temporal   SpO2: 97%   Weight: 80.3 kg (177 lb)   Height: 177.8 cm (70\")     Estimated body mass index is 25.4 kg/m² as calculated from the following:    Height as of this encounter: 177.8 cm (70\").    " Weight as of this encounter: 80.3 kg (177 lb).    BMI is >= 25 and <30. (Overweight) The following options were offered after discussion;: weight loss educational material (shared in after visit summary) and exercise counseling/recommendations    Does the patient have evidence of cognitive impairment? No                                                                                          Health  Risk Assessment    Smoking Status:  Social History     Tobacco Use   Smoking Status Never    Passive exposure: Never   Smokeless Tobacco Current    Types: Snuff     Alcohol Consumption:  Social History     Substance and Sexual Activity   Alcohol Use No     Fall Risk Screen  STEADI Fall Risk Assessment was completed, and patient is at MODERATE risk for falls. Assessment completed on:2025    Depression Screening   Little interest or pleasure in doing things? Not at all   Feeling down, depressed, or hopeless? Not at all   PHQ-2 Total Score 0      Health Habits and Functional and Cognitive Screenin/24/2025     1:38 PM   Functional & Cognitive Status   Do you have difficulty preparing food and eating? No   Do you have difficulty bathing yourself, getting dressed or grooming yourself? No   Do you have difficulty using the toilet? No   Do you have difficulty moving around from place to place? No   Do you have trouble with steps or getting out of a bed or a chair? No   Current Diet Well Balanced Diet   Dental Exam Not up to date   Eye Exam Up to date   Exercise (times per week) 7 times per week   Current Exercises Include Walking;Yard Work   Do you need help using the phone?  No   Are you deaf or do you have serious difficulty hearing?  No   Do you need help to go to places out of walking distance? No   Do you need help shopping? No   Do you need help preparing meals?  No   Do you need help with housework?  No   Do you need help with laundry? No   Do you need help taking your medications? No   Do you need help  managing money? No   Do you ever drive or ride in a car without wearing a seat belt? No   Have you felt unusual stress, anger or loneliness in the last month? No   Who do you live with? Spouse   If you need help, do you have trouble finding someone available to you? No   Have you been bothered in the last four weeks by sexual problems? No   Do you have difficulty concentrating, remembering or making decisions? Yes           Age-appropriate Screening Schedule:  Refer to the list below for future screening recommendations based on patient's age, sex and/or medical conditions. Orders for these recommended tests are listed in the plan section. The patient has been provided with a written plan.    Health Maintenance List  Health Maintenance   Topic Date Due    URINE MICROALBUMIN-CREATININE RATIO (uACR)  Never done    ZOSTER VACCINE (1 of 2) Never done    DIABETIC FOOT EXAM  01/07/2020    DIABETIC EYE EXAM  08/30/2023    COVID-19 Vaccine (3 - 2024-25 season) 09/01/2024    BMI FOLLOWUP  11/28/2024    HEMOGLOBIN A1C  05/22/2025    COLORECTAL CANCER SCREENING  02/28/2025 (Originally 4/26/2000)    LIPID PANEL  11/22/2025    ANNUAL WELLNESS VISIT  02/24/2026    TDAP/TD VACCINES (3 - Td or Tdap) 07/23/2030    HEPATITIS C SCREENING  Completed    INFLUENZA VACCINE  Completed    Pneumococcal Vaccine 50+  Completed                                                                                                                                              CMS Preventative Services Quick Reference  Risk Factors Identified During Encounter  Chronic Pain: Natural history and expected course discussed. Questions answered.  Immunizations Discussed/Encouraged: Shingrix, COVID19, and RSV (Respiratory Syncytial Virus)  Tobacco Use/Dependance Risk (use dotphrase .tobaccocessation for documentation)    The above risks/problems have been discussed with the patient.  Pertinent information has been shared with the patient in the After Visit  Summary.  An After Visit Summary and PPPS were made available to the patient.    Follow Up:   Next Medicare Wellness visit to be scheduled in 1 year.     Additional E&M Note during same encounter follows:  Patient has additional, significant, and separately identifiable condition(s)/problem(s) that require work above and beyond the Medicare Wellness Visit     Chief Complaint  He returns for a scheduled reassessment of multiple medical problems including type 2 diabetes mellitus, hyperlipidemia, essential hypertension, gastroesophageal reflux disease, and urinary frequency    Subjective   HPI  Ed is also being seen today for additional medical problem/s.    Type 2 Diabetes Mellitus  He admits to intermittent polyuria.  He continues to deny any polydipsia, paresthesias of the feet, visual disturbances, hypoglycemia or foot ulcerations. He remains on metformin and empagliflozin.  He admits to occasional redness and irritation of the foreskin that resolves promptly with topical miconazole  Lab Results   Component Value Date    HGBA1C 7.50 (H) 11/22/2024     Lab Results   Component Value Date    MICROALBUR 1.9 11/22/2024     Lab Results   Component Value Date    [id number] 408 11/22/2024     Hyperlipidemia  His compliance with treatment has been fairly good.  He is not following any particular diet but remains quite active. He is taking atorvastatin with no apparent side effects.   Lab Results   Component Value Date    CHOL 83 11/22/2024    CHLPL 88 05/02/2016    TRIG 171 (H) 11/22/2024    HDL 32 (L) 11/22/2024    LDL 23 11/22/2024     Essential Hypertension  He continues to deny any chest pain, palpitations, dyspnea, orthopnea, paroxysmal nocturnal dyspnea or peripheral edema.  He is taking lisinopril with no apparent side effects  Lab Results   Component Value Date    GLUCOSE 200 (H) 11/22/2024    BUN 19 11/22/2024    CREATININE 1.06 11/22/2024     11/22/2024    K 4.4 11/22/2024     11/22/2024     CALCIUM 9.0 11/22/2024    PROTEINTOT 7.4 11/22/2024    ALBUMIN 4.0 11/22/2024    ALT 18 11/22/2024    AST 19 11/22/2024    ALKPHOS 76 11/22/2024    BILITOT 0.3 11/22/2024    GLOB 3.4 11/22/2024    AGRATIO 1.2 11/22/2024    BCR 17.9 11/22/2024    ANIONGAP 12.8 11/22/2024    EGFR 76.0 11/22/2024     GERD  He remains heartburn free on pantoprazole.  There is no history of any difficulty swallowing, nausea, vomiting, change in his bowel habits, hematochezia, or melena    Urinary Frequency  He has noted a continued improvement in his urinary frequency and nocturia.  He is only have a 1-2 episodes of the latter nightly at present.  He admits to occasional hesitancy but there is no history of any other difficulty voiding and he continues to deny any urgency, dysuria, or hematuria.  He admits to a several year history of increasing difficulty obtaining and maintaining erection.  He continues to deny any problems with his bowel or bladder control and there is no history of any changes in his voice or beard growth.  There is no history of any lower extremity weakness, numbness, or tingling.  He was followed by urology in the past but remains uninterested in returning    Labs  Lab Results   Component Value Date    WBC 12.51 (H) 12/02/2024    HGB 13.9 12/02/2024    HCT 41.6 12/02/2024    MCV 88.5 12/02/2024     12/02/2024     Review of Systems   Constitutional:  Positive for fatigue. Negative for appetite change, chills and fever.   HENT:  Positive for dental problem, rhinorrhea and sneezing. Negative for congestion, ear pain, postnasal drip, sore throat and voice change.    Eyes:  Negative for visual disturbance.   Respiratory:  Negative for cough, shortness of breath and wheezing.    Cardiovascular:  Negative for chest pain, palpitations and leg swelling.   Gastrointestinal:  Negative for abdominal pain, blood in stool, constipation, diarrhea, nausea and vomiting.   Endocrine: Positive for polyuria. Negative for  "polydipsia.   Genitourinary:  Positive for difficulty urinating (intermittent hesitancy) and frequency (with nocturia x 1-2). Negative for dysuria, hematuria and urgency.        Erectile dysfunction   Musculoskeletal:  Positive for arthralgias. Negative for back pain, joint swelling, myalgias and neck pain.   Skin:  Negative for rash.   Neurological:  Negative for weakness and numbness.   Psychiatric/Behavioral:  Positive for sleep disturbance. Negative for dysphoric mood. The patient is not nervous/anxious.         Objective   Vital Signs:  /70   Pulse 84   Temp 97.9 °F (36.6 °C) (Temporal)   Resp 14   Ht 177.8 cm (70\")   Wt 80.3 kg (177 lb)   SpO2 97%   BMI 25.40 kg/m²     Physical Exam  Constitutional:       General: He is not in acute distress.     Appearance: Normal appearance. He is well-developed. He is not ill-appearing or diaphoretic.      Comments: Accompanied by his wife. Bright and in good spirits. No apparent distress. No pallor, jaundice, diaphoresis, or cyanosis.   HENT:      Head: Atraumatic.      Right Ear: Tympanic membrane, ear canal and external ear normal.      Left Ear: Tympanic membrane, ear canal and external ear normal.      Mouth/Throat:      Lips: No lesions.      Mouth: Mucous membranes are moist. No oral lesions.      Dentition: Dental caries present.      Pharynx: No oropharyngeal exudate or posterior oropharyngeal erythema.   Eyes:      General: Lids are normal. Gaze aligned appropriately.      Extraocular Movements: Extraocular movements intact.      Conjunctiva/sclera: Conjunctivae normal.      Pupils: Pupils are equal.   Neck:      Thyroid: No thyroid mass or thyromegaly.      Vascular: No carotid bruit or JVD.      Trachea: Trachea normal. No tracheal deviation.   Cardiovascular:      Rate and Rhythm: Normal rate and regular rhythm.      Heart sounds: Normal heart sounds, S1 normal and S2 normal. No murmur heard.     No gallop. No S3 or S4 sounds.   Pulmonary:      " Effort: Pulmonary effort is normal.      Breath sounds: Normal breath sounds.   Abdominal:      General: Bowel sounds are normal. There is no distension or abdominal bruit.      Palpations: Abdomen is soft. There is no hepatomegaly, splenomegaly or mass.      Tenderness: There is no abdominal tenderness.      Hernia: No hernia is present.   Musculoskeletal:      Right lower leg: No edema.      Left lower leg: No edema.   Lymphadenopathy:      Head:      Right side of head: No submental, submandibular, tonsillar, preauricular, posterior auricular or occipital adenopathy.      Left side of head: No submental, submandibular, tonsillar, preauricular, posterior auricular or occipital adenopathy.      Cervical: No cervical adenopathy.      Upper Body:      Right upper body: No supraclavicular adenopathy.      Left upper body: No supraclavicular adenopathy.   Skin:     General: Skin is warm and dry.      Coloration: Skin is not cyanotic, jaundiced or pale.      Findings: No rash.      Nails: There is no clubbing.   Neurological:      Mental Status: He is alert and oriented to person, place, and time.      Cranial Nerves: No cranial nerve deficit, dysarthria or facial asymmetry.      Sensory: Sensory deficit (decreased vibration sense toes of both feet) present.      Motor: No tremor.      Coordination: Coordination normal.      Gait: Gait normal.   Psychiatric:         Attention and Perception: Attention normal.         Mood and Affect: Mood normal.         Speech: Speech normal.         Behavior: Behavior normal.         Thought Content: Thought content normal.           Assessment and Plan   Additional age appropriate preventative wellness advice topics were discussed during today's preventative wellness exam(some topics already addressed during AWV portion of the note above):    Physical Activity: Advised cardiovascular activity 150 minutes per week as tolerated. (example brisk walk for 30 minutes, 5 days a week).      Nutrition: Discussed nutrition plan with patient. Information shared in after visit summary. Goal is for a well balanced diet to enhance overall health.     Tobacco Misuse Discussion: Information shared in after visit summary.     Chronic seasonal allergic rhinitis  Continue current medication  Encouraged to report if any worse or if any new symptoms or concerns.       Mixed hyperlipidemia   Encouraged to continue to work on his diet and exercise plan.  Continue current medication  Orders:    Comprehensive Metabolic Panel    Lipid Panel    TSH    Essential hypertension  As above.   Continue current medication  Orders:    CBC & Differential    Comprehensive Metabolic Panel    TSH    Type 2 diabetes mellitus with diabetic neuropathy, without long-term current use of insulin  Diabetes mellitus Type II, under good control.   Encouraged to continue to pursue ADA diet  Encouraged aerobic exercise.  Continue current medication  Orders:    Comprehensive Metabolic Panel    TSH    Hemoglobin A1c    Vitamin B12    Microalbumin / Creatinine Urine Ratio - Urine, Clean Catch    Urinalysis With Microscopic - Urine, Clean Catch    Gastro-esophageal reflux disease without esophagitis   Symptoms are currently well controlled.  Continue current medication.  Orders:    CBC & Differential    Urinary frequency  Encouraged to report if any worse or if any new symptoms or concerns       Other male erectile dysfunction  Continue current medication  Encouraged to report if any worse or if any new symptoms or concerns       Healthcare maintenance  Recommended RSV, Shingrix, and an updated COVID-19.  He remains uninterested in these or in undergoing colon cancer screening  Orders:    PSA SCREENING    Arthralgia of both hands  Reminded regarding symptomatic treatment.   Continue current medication  Encouraged to report if any worse or if any new symptoms or concerns.  Orders:    CBC & Differential    Seborrheic dermatitis       Dips  tobacco  Lengthy discussion regarding the potential sequela of continued tobacco use and the options with respect to cessation.  Patient uninterested in pursuing at present but will consider.       Weight loss  Updated labs drawn.  Patient will be notified of the results and any further action to be taken  Orders:    CBC & Differential    Comprehensive Metabolic Panel    TSH    Vitamin B12    Sedimentation Rate    C-reactive Protein    Peripheral Blood Smear    PSA SCREENING    Dental caries  Patient agrees to an oral surgery assessment if a direct referral is excepted  Orders:    Ambulatory Referral to Oral Maxillofacial Surgery            Follow Up   Return in about 3 months (around 6/6/2025).  Patient was given instructions and counseling regarding his condition or for health maintenance advice. Please see specific information pulled into the AVS if appropriate.

## 2025-02-24 NOTE — ASSESSMENT & PLAN NOTE
Recommended RSV, Shingrix, and an updated COVID-19.  He remains uninterested in these or in undergoing colon cancer screening  Orders:    PSA SCREENING

## 2025-02-24 NOTE — ASSESSMENT & PLAN NOTE
Encouraged to continue to work on his diet and exercise plan.  Continue current medication  Orders:    Comprehensive Metabolic Panel    Lipid Panel    TSH

## 2025-02-24 NOTE — ASSESSMENT & PLAN NOTE
Reminded regarding symptomatic treatment.   Continue current medication  Encouraged to report if any worse or if any new symptoms or concerns.  Orders:    CBC & Differential

## 2025-02-25 LAB
ALBUMIN SERPL-MCNC: 4.1 G/DL (ref 3.5–5.2)
ALBUMIN UR-MCNC: <1.2 MG/DL
ALBUMIN/GLOB SERPL: 1.2 G/DL
ALP SERPL-CCNC: 60 U/L (ref 39–117)
ALT SERPL W P-5'-P-CCNC: 21 U/L (ref 1–41)
ANION GAP SERPL CALCULATED.3IONS-SCNC: 12 MMOL/L (ref 5–15)
AST SERPL-CCNC: 18 U/L (ref 1–40)
BACTERIA UR QL AUTO: ABNORMAL /HPF
BASOPHILS # BLD AUTO: 0.09 10*3/MM3 (ref 0–0.2)
BASOPHILS NFR BLD AUTO: 0.9 % (ref 0–1.5)
BILIRUB SERPL-MCNC: 0.3 MG/DL (ref 0–1.2)
BILIRUB UR QL STRIP: NEGATIVE
BUN SERPL-MCNC: 15 MG/DL (ref 8–23)
BUN/CREAT SERPL: 16.9 (ref 7–25)
CALCIUM SPEC-SCNC: 9.5 MG/DL (ref 8.6–10.5)
CHLORIDE SERPL-SCNC: 102 MMOL/L (ref 98–107)
CHOLEST SERPL-MCNC: 102 MG/DL (ref 0–200)
CLARITY UR: CLEAR
CO2 SERPL-SCNC: 22 MMOL/L (ref 22–29)
COLOR UR: YELLOW
CREAT SERPL-MCNC: 0.89 MG/DL (ref 0.76–1.27)
CREAT UR-MCNC: 29.8 MG/DL
CRP SERPL-MCNC: <0.3 MG/DL (ref 0–0.5)
DEPRECATED RDW RBC AUTO: 48.2 FL (ref 37–54)
EGFRCR SERPLBLD CKD-EPI 2021: 92.8 ML/MIN/1.73
EOSINOPHIL # BLD AUTO: 0.95 10*3/MM3 (ref 0–0.4)
EOSINOPHIL NFR BLD AUTO: 9.6 % (ref 0.3–6.2)
ERYTHROCYTE [DISTWIDTH] IN BLOOD BY AUTOMATED COUNT: 14.6 % (ref 12.3–15.4)
ERYTHROCYTE [SEDIMENTATION RATE] IN BLOOD: 12 MM/HR (ref 0–20)
GLOBULIN UR ELPH-MCNC: 3.3 GM/DL
GLUCOSE SERPL-MCNC: 90 MG/DL (ref 65–99)
GLUCOSE UR STRIP-MCNC: ABNORMAL MG/DL
HBA1C MFR BLD: 7.3 % (ref 4.8–5.6)
HCT VFR BLD AUTO: 46.3 % (ref 37.5–51)
HDLC SERPL-MCNC: 36 MG/DL (ref 40–60)
HGB BLD-MCNC: 15.1 G/DL (ref 13–17.7)
HGB UR QL STRIP.AUTO: ABNORMAL
HYALINE CASTS UR QL AUTO: ABNORMAL /LPF
IMM GRANULOCYTES # BLD AUTO: 0.02 10*3/MM3 (ref 0–0.05)
IMM GRANULOCYTES NFR BLD AUTO: 0.2 % (ref 0–0.5)
KETONES UR QL STRIP: NEGATIVE
LDLC SERPL CALC-MCNC: 45 MG/DL (ref 0–100)
LDLC/HDLC SERPL: 1.19 {RATIO}
LEUKOCYTE ESTERASE UR QL STRIP.AUTO: ABNORMAL
LYMPHOCYTES # BLD AUTO: 2.79 10*3/MM3 (ref 0.7–3.1)
LYMPHOCYTES NFR BLD AUTO: 28.1 % (ref 19.6–45.3)
MCH RBC QN AUTO: 29.2 PG (ref 26.6–33)
MCHC RBC AUTO-ENTMCNC: 32.6 G/DL (ref 31.5–35.7)
MCV RBC AUTO: 89.6 FL (ref 79–97)
MICROALBUMIN/CREAT UR: NORMAL MG/G{CREAT}
MONOCYTES # BLD AUTO: 0.68 10*3/MM3 (ref 0.1–0.9)
MONOCYTES NFR BLD AUTO: 6.8 % (ref 5–12)
NEUTROPHILS NFR BLD AUTO: 5.4 10*3/MM3 (ref 1.7–7)
NEUTROPHILS NFR BLD AUTO: 54.4 % (ref 42.7–76)
NITRITE UR QL STRIP: NEGATIVE
NRBC BLD AUTO-RTO: 0 /100 WBC (ref 0–0.2)
PH UR STRIP.AUTO: 6 [PH] (ref 5–8)
PLATELET # BLD AUTO: 247 10*3/MM3 (ref 140–450)
PMV BLD AUTO: 10 FL (ref 6–12)
POTASSIUM SERPL-SCNC: 4.6 MMOL/L (ref 3.5–5.2)
PROT SERPL-MCNC: 7.4 G/DL (ref 6–8.5)
PROT UR QL STRIP: NEGATIVE
PSA SERPL-MCNC: 0.71 NG/ML (ref 0–4)
RBC # BLD AUTO: 5.17 10*6/MM3 (ref 4.14–5.8)
RBC # UR STRIP: ABNORMAL /HPF
REF LAB TEST METHOD: ABNORMAL
SODIUM SERPL-SCNC: 136 MMOL/L (ref 136–145)
SP GR UR STRIP: 1.02 (ref 1–1.03)
SQUAMOUS #/AREA URNS HPF: ABNORMAL /HPF
TRIGL SERPL-MCNC: 116 MG/DL (ref 0–150)
TSH SERPL DL<=0.05 MIU/L-ACNC: 1.63 UIU/ML (ref 0.27–4.2)
UROBILINOGEN UR QL STRIP: ABNORMAL
VIT B12 BLD-MCNC: 402 PG/ML (ref 211–946)
VLDLC SERPL-MCNC: 21 MG/DL (ref 5–40)
WBC # UR STRIP: ABNORMAL /HPF
WBC NRBC COR # BLD AUTO: 9.93 10*3/MM3 (ref 3.4–10.8)

## 2025-02-25 NOTE — ASSESSMENT & PLAN NOTE
Updated labs drawn.  Patient will be notified of the results and any further action to be taken  Orders:    CBC & Differential    Comprehensive Metabolic Panel    TSH    Vitamin B12    Sedimentation Rate    C-reactive Protein    Peripheral Blood Smear    PSA SCREENING

## 2025-02-25 NOTE — ASSESSMENT & PLAN NOTE
Patient agrees to an oral surgery assessment if a direct referral is excepted  Orders:    Ambulatory Referral to Oral Maxillofacial Surgery

## 2025-04-24 ENCOUNTER — TELEPHONE (OUTPATIENT)
Dept: FAMILY MEDICINE CLINIC | Facility: CLINIC | Age: 70
End: 2025-04-24

## 2025-04-24 DIAGNOSIS — M25.541 ARTHRALGIA OF BOTH HANDS: ICD-10-CM

## 2025-04-24 DIAGNOSIS — M25.542 ARTHRALGIA OF BOTH HANDS: ICD-10-CM

## 2025-04-24 RX ORDER — MELOXICAM 15 MG/1
15 TABLET ORAL DAILY PRN
Qty: 90 TABLET | Refills: 0 | Status: SHIPPED | OUTPATIENT
Start: 2025-04-24

## 2025-04-24 NOTE — TELEPHONE ENCOUNTER
Caller: Oriana Villalba    Relationship: Emergency Contact    Best call back number: [phone]    Requested Prescriptions:   Requested Prescriptions      No prescriptions requested or ordered in this encounter      meloxicam (MOBIC) 15 MG tablet     Pharmacy where request should be sent: [facility] [id number] - [place], [place] - [address] AT [facility] [address]  - [phone] [facility] [phone] FX     Last office visit with prescribing clinician: 2/24/2025   Last telemedicine visit with prescribing clinician: Visit date not found   Next office visit with prescribing clinician: 6/5/2025     Additional details provided by patient:     COMPLETELY OUT OF MEDICATION     Does the patient have less than a 3 day supply:  [x] Yes  [] No    Would you like a call back once the refill request has been completed: [] Yes [x] No    If the office needs to give you a call back, can they leave a voicemail: [] Yes [x] No    Sheryl Curiel, PCT   04/24/25 13:34 EDT

## 2025-05-09 DIAGNOSIS — E11.40 TYPE 2 DIABETES MELLITUS WITH DIABETIC NEUROPATHY, WITHOUT LONG-TERM CURRENT USE OF INSULIN: ICD-10-CM

## 2025-05-09 RX ORDER — MICONAZOLE NITRATE 20 MG/G
CREAM TOPICAL
Qty: 30 G | Refills: 0 | Status: SHIPPED | OUTPATIENT
Start: 2025-05-09

## 2025-06-05 ENCOUNTER — OFFICE VISIT (OUTPATIENT)
Dept: FAMILY MEDICINE CLINIC | Facility: CLINIC | Age: 70
End: 2025-06-05
Payer: MEDICARE

## 2025-06-05 VITALS
OXYGEN SATURATION: 95 % | BODY MASS INDEX: 25.62 KG/M2 | SYSTOLIC BLOOD PRESSURE: 125 MMHG | RESPIRATION RATE: 14 BRPM | TEMPERATURE: 98.6 F | DIASTOLIC BLOOD PRESSURE: 68 MMHG | HEART RATE: 88 BPM | WEIGHT: 179 LBS | HEIGHT: 70 IN

## 2025-06-05 DIAGNOSIS — E11.40 TYPE 2 DIABETES MELLITUS WITH DIABETIC NEUROPATHY, WITHOUT LONG-TERM CURRENT USE OF INSULIN: ICD-10-CM

## 2025-06-05 DIAGNOSIS — E78.2 MIXED HYPERLIPIDEMIA: ICD-10-CM

## 2025-06-05 DIAGNOSIS — R35.0 URINARY FREQUENCY: ICD-10-CM

## 2025-06-05 DIAGNOSIS — K21.9 GASTRO-ESOPHAGEAL REFLUX DISEASE WITHOUT ESOPHAGITIS: ICD-10-CM

## 2025-06-05 DIAGNOSIS — N52.8 OTHER MALE ERECTILE DYSFUNCTION: ICD-10-CM

## 2025-06-05 DIAGNOSIS — I10 ESSENTIAL HYPERTENSION: ICD-10-CM

## 2025-06-05 DIAGNOSIS — R63.4 WEIGHT LOSS: ICD-10-CM

## 2025-06-05 DIAGNOSIS — M25.541 ARTHRALGIA OF BOTH HANDS: ICD-10-CM

## 2025-06-05 DIAGNOSIS — Z00.00 HEALTHCARE MAINTENANCE: ICD-10-CM

## 2025-06-05 DIAGNOSIS — J30.2 CHRONIC SEASONAL ALLERGIC RHINITIS: Primary | ICD-10-CM

## 2025-06-05 DIAGNOSIS — Z72.0 DIPS TOBACCO: ICD-10-CM

## 2025-06-05 DIAGNOSIS — M25.542 ARTHRALGIA OF BOTH HANDS: ICD-10-CM

## 2025-06-05 RX ORDER — SILDENAFIL 100 MG/1
TABLET, FILM COATED ORAL
Qty: 10 TABLET | Refills: 5 | Status: SHIPPED | OUTPATIENT
Start: 2025-06-05

## 2025-06-05 NOTE — PROGRESS NOTES
Subjective   Ed Villalba is a 70 y.o. male.     Chief Complaint  He returns for a scheduled reassessment of multiple medical problems including weight loss, type 2 diabetes mellitus, hyperlipidemia, essential hypertension, gastroesophageal reflux disease, and urinary frequency    History of Present Illness     Weight Loss  He has noted an improvement in his appetite since last here and his weight has remained stable.  He continues to deny any new concerns, and there is no history of any fever, chills, or night sweats    Type 2 Diabetes Mellitus  He admits to intermittent polyuria.  He continues to deny any polydipsia, paresthesias of the feet, visual disturbances, hypoglycemia or foot ulcerations. He remains on metformin and empagliflozin.  He admits to occasional redness and irritation of the foreskin that resolves promptly with topical miconazole  Lab Results   Component Value Date    HGBA1C 7.30 (H) 02/24/2025     Lab Results   Component Value Date    MICROALBUR <1.2 02/24/2025     Lab Results   Component Value Date    PHNAHRVF78 402 02/24/2025     Hyperlipidemia  His compliance with treatment has been fairly good.  He is not following any particular diet but remains quite active. He is taking atorvastatin with no apparent side effects.   Lab Results   Component Value Date    CHOL 102 02/24/2025    CHLPL 88 05/02/2016    TRIG 116 02/24/2025    HDL 36 (L) 02/24/2025    LDL 45 02/24/2025     Essential Hypertension  He continues to deny any chest pain, palpitations, dyspnea, orthopnea, paroxysmal nocturnal dyspnea or peripheral edema.  He is taking lisinopril with no apparent side effects  Lab Results   Component Value Date    GLUCOSE 90 02/24/2025    BUN 15 02/24/2025    CREATININE 0.89 02/24/2025     02/24/2025    K 4.6 02/24/2025     02/24/2025    CALCIUM 9.5 02/24/2025    PROTEINTOT 7.4 02/24/2025    ALBUMIN 4.1 02/24/2025    ALT 21 02/24/2025    AST 18 02/24/2025    ALKPHOS 60 02/24/2025    BILITOT  0.3 02/24/2025    GLOB 3.3 02/24/2025    AGRATIO 1.2 02/24/2025    BCR 16.9 02/24/2025    ANIONGAP 12.0 02/24/2025    EGFR 92.8 02/24/2025     GERD  He remains heartburn free on pantoprazole.  There is no history of any difficulty swallowing, nausea, vomiting, change in his bowel habits, hematochezia, or melena  Lab Results   Component Value Date    WBC 9.93 02/24/2025    HGB 15.1 02/24/2025    HCT 46.3 02/24/2025    MCV 89.6 02/24/2025     02/24/2025     Urinary Frequency  He has noted a continued improvement in his urinary frequency and nocturia.  He is only have a 1-2 episodes of the latter nightly at present.  He admits to occasional hesitancy but there is no history of any other difficulty voiding and he continues to deny any urgency, dysuria, or hematuria.  He admits to a several year history of increasing difficulty obtaining and maintaining erection.  He continues to deny any problems with his bowel or bladder control and there is no history of any changes in his voice or beard growth.  There is no history of any lower extremity weakness, numbness, or tingling.  He was followed by urology in the past but remains uninterested in returning  Lab Results   Component Value Date    PSA 0.706 02/24/2025     Labs  Lab Results   Component Value Date    SEDRATE 12 02/24/2025     Lab Results   Component Value Date    CRP <0.30 02/24/2025     Lab Results   Component Value Date    TSH 1.630 02/24/2025     The following portions of the patient's history were reviewed and updated as appropriate: allergies, current medications, past medical history, past social history, and problem list.    Review of Systems   Constitutional:  Positive for fatigue and unexpected weight loss (stabilized). Negative for chills and fever.   HENT:  Positive for dental problem, rhinorrhea and sneezing. Negative for congestion, postnasal drip, sinus pressure, sore throat and voice change.    Eyes:  Positive for visual disturbance.    Respiratory:  Negative for cough, shortness of breath and wheezing.    Cardiovascular:  Negative for chest pain, palpitations and leg swelling.   Gastrointestinal:  Negative for abdominal pain, blood in stool, constipation, diarrhea, nausea, vomiting and GERD.   Genitourinary:  Positive for difficulty urinating (intermittent hesitancy), frequency, nocturia and erectile dysfunction. Negative for dysuria, hematuria, urgency and urinary incontinence.   Musculoskeletal:  Positive for arthralgias. Negative for back pain, joint swelling, myalgias and neck pain.   Skin:  Negative for color change and rash.   Neurological:  Negative for dizziness, weakness, numbness and confusion.   Hematological:  Does not bruise/bleed easily.   Psychiatric/Behavioral:  Positive for sleep disturbance. Negative for decreased concentration, hallucinations and depressed mood. The patient is not nervous/anxious.      Objective   Physical Exam  Constitutional:       General: He is not in acute distress.     Appearance: Normal appearance. He is well-developed. He is not ill-appearing or diaphoretic.      Comments: Accompanied by his wife. Bright and in good spirits. No apparent distress. No pallor, jaundice, diaphoresis, or cyanosis.   HENT:      Head: Atraumatic.      Right Ear: Tympanic membrane, ear canal and external ear normal.      Left Ear: Tympanic membrane, ear canal and external ear normal.      Mouth/Throat:      Lips: No lesions.      Mouth: Mucous membranes are moist. No oral lesions.      Dentition: Dental caries present.      Pharynx: No oropharyngeal exudate or posterior oropharyngeal erythema.   Eyes:      General: Lids are normal. Gaze aligned appropriately.      Extraocular Movements: Extraocular movements intact.      Conjunctiva/sclera: Conjunctivae normal.      Pupils: Pupils are equal.   Neck:      Thyroid: No thyroid mass or thyromegaly.      Vascular: No carotid bruit or JVD.      Trachea: Trachea normal. No tracheal  deviation.   Cardiovascular:      Rate and Rhythm: Normal rate and regular rhythm.      Heart sounds: Normal heart sounds, S1 normal and S2 normal. No murmur heard.     No gallop. No S3 or S4 sounds.   Pulmonary:      Effort: Pulmonary effort is normal.      Breath sounds: Normal breath sounds.   Abdominal:      General: Bowel sounds are normal. There is no distension.   Musculoskeletal:      Right lower leg: No edema.      Left lower leg: No edema.   Lymphadenopathy:      Head:      Right side of head: No submental, submandibular, tonsillar, preauricular, posterior auricular or occipital adenopathy.      Left side of head: No submental, submandibular, tonsillar, preauricular, posterior auricular or occipital adenopathy.      Cervical: No cervical adenopathy.      Upper Body:      Right upper body: No supraclavicular adenopathy.      Left upper body: No supraclavicular adenopathy.   Skin:     General: Skin is warm and dry.      Coloration: Skin is not cyanotic, jaundiced or pale.      Findings: No rash.      Nails: There is no clubbing.   Neurological:      Mental Status: He is alert and oriented to person, place, and time.      Cranial Nerves: No cranial nerve deficit, dysarthria or facial asymmetry.      Sensory: Sensory deficit (decreased vibration sense toes of both feet) present.      Motor: No tremor.      Coordination: Coordination normal.      Gait: Gait normal.   Psychiatric:         Attention and Perception: Attention normal.         Mood and Affect: Mood normal.         Speech: Speech normal.         Behavior: Behavior normal.         Thought Content: Thought content normal.       Assessment & Plan   Problems Addressed this Visit          Allergies and Adverse Reactions    Chronic seasonal allergic rhinitis        Cardiac and Vasculature    Essential hypertension   Hypertension: at goal. Evidence of target organ damage: none.  Encouraged to continue to work on diet and exercise plan.   Continue current  medication    Mixed hyperlipidemia  As above.   Continue current medication.       Endocrine and Metabolic    Type 2 diabetes mellitus with diabetic neuropathy, without long-term current use of insulin  Diabetes mellitus Type II, under good control.   Encouraged to continue to pursue ADA diet  Encouraged aerobic exercise.  Continue current medication  Updated labs will be drawn at his return.    Weight loss  Appears to have stabilized.   Patient will continue to monitor at home and will report if any further loss       Gastrointestinal Abdominal     Gastro-esophageal reflux disease without esophagitis   Symptoms are currently well controlled.  Continue current medication.       Genitourinary and Reproductive     Other male erectile dysfunction    Relevant Medications    sildenafil (VIAGRA) 100 MG tablet    Urinary frequency       Health Encounters    Healthcare maintenance  Patient remains uninterested in any cancer screening  Reminded that he is due for RSV and Shingrix       Musculoskeletal and Injuries    Arthralgia of both hands       Tobacco    Dips tobacco     Diagnoses         Codes Comments      Chronic seasonal allergic rhinitis    -  Primary ICD-10-CM: J30.2  ICD-9-CM: 477.8       Mixed hyperlipidemia     ICD-10-CM: E78.2  ICD-9-CM: 272.2       Essential hypertension     ICD-10-CM: I10  ICD-9-CM: 401.9       Type 2 diabetes mellitus with diabetic neuropathy, without long-term current use of insulin     ICD-10-CM: E11.40  ICD-9-CM: 250.60, 357.2       Gastro-esophageal reflux disease without esophagitis     ICD-10-CM: K21.9  ICD-9-CM: 530.81       Urinary frequency     ICD-10-CM: R35.0  ICD-9-CM: 788.41       Other male erectile dysfunction     ICD-10-CM: N52.8  ICD-9-CM: 607.84       Healthcare maintenance     ICD-10-CM: Z00.00  ICD-9-CM: V70.0       Arthralgia of both hands     ICD-10-CM: M25.541, M25.542  ICD-9-CM: 719.44       Dips tobacco     ICD-10-CM: Z72.0  ICD-9-CM: 305.1       Weight loss      ICD-10-CM: R63.4  ICD-9-CM: 783.21

## 2025-07-23 DIAGNOSIS — M25.541 ARTHRALGIA OF BOTH HANDS: ICD-10-CM

## 2025-07-23 DIAGNOSIS — M25.542 ARTHRALGIA OF BOTH HANDS: ICD-10-CM

## 2025-07-23 RX ORDER — MELOXICAM 15 MG/1
15 TABLET ORAL DAILY PRN
Qty: 90 TABLET | Refills: 0 | Status: SHIPPED | OUTPATIENT
Start: 2025-07-23

## 2025-08-17 DIAGNOSIS — M25.542 ARTHRALGIA OF BOTH HANDS: ICD-10-CM

## 2025-08-17 DIAGNOSIS — M25.541 ARTHRALGIA OF BOTH HANDS: ICD-10-CM

## 2025-08-18 RX ORDER — ACETAMINOPHEN 500 MG
TABLET ORAL
Qty: 180 TABLET | Refills: 5 | Status: SHIPPED | OUTPATIENT
Start: 2025-08-18